# Patient Record
Sex: FEMALE | Race: BLACK OR AFRICAN AMERICAN | NOT HISPANIC OR LATINO | Employment: FULL TIME | URBAN - METROPOLITAN AREA
[De-identification: names, ages, dates, MRNs, and addresses within clinical notes are randomized per-mention and may not be internally consistent; named-entity substitution may affect disease eponyms.]

---

## 2017-02-20 ENCOUNTER — OFFICE VISIT (OUTPATIENT)
Dept: SURGERY | Facility: CLINIC | Age: 62
End: 2017-02-20
Payer: COMMERCIAL

## 2017-02-20 ENCOUNTER — OFFICE VISIT (OUTPATIENT)
Dept: INTERNAL MEDICINE | Facility: CLINIC | Age: 62
End: 2017-02-20
Payer: COMMERCIAL

## 2017-02-20 VITALS
HEART RATE: 75 BPM | WEIGHT: 162.25 LBS | HEIGHT: 60 IN | SYSTOLIC BLOOD PRESSURE: 149 MMHG | DIASTOLIC BLOOD PRESSURE: 58 MMHG | BODY MASS INDEX: 31.86 KG/M2

## 2017-02-20 VITALS
DIASTOLIC BLOOD PRESSURE: 84 MMHG | HEART RATE: 85 BPM | BODY MASS INDEX: 32.39 KG/M2 | HEIGHT: 60 IN | SYSTOLIC BLOOD PRESSURE: 140 MMHG | WEIGHT: 165 LBS

## 2017-02-20 DIAGNOSIS — K64.4 EXTERNAL HEMORRHOIDS: ICD-10-CM

## 2017-02-20 DIAGNOSIS — K64.8 HEMORRHOIDS, INTERNAL, WITH BLEEDING: Primary | ICD-10-CM

## 2017-02-20 DIAGNOSIS — K62.5 RECTAL BLEEDING: Primary | ICD-10-CM

## 2017-02-20 DIAGNOSIS — D13.7 INSULINOMA: ICD-10-CM

## 2017-02-20 DIAGNOSIS — K59.00 CONSTIPATION, UNSPECIFIED CONSTIPATION TYPE: ICD-10-CM

## 2017-02-20 PROCEDURE — 99203 OFFICE O/P NEW LOW 30 MIN: CPT | Mod: 25,S$GLB,, | Performed by: COLON & RECTAL SURGERY

## 2017-02-20 PROCEDURE — 1160F RVW MEDS BY RX/DR IN RCRD: CPT | Mod: S$GLB,,, | Performed by: INTERNAL MEDICINE

## 2017-02-20 PROCEDURE — 46600 DIAGNOSTIC ANOSCOPY SPX: CPT | Mod: S$GLB,,, | Performed by: COLON & RECTAL SURGERY

## 2017-02-20 PROCEDURE — 99213 OFFICE O/P EST LOW 20 MIN: CPT | Mod: S$GLB,,, | Performed by: INTERNAL MEDICINE

## 2017-02-20 PROCEDURE — 99999 PR PBB SHADOW E&M-EST. PATIENT-LVL IV: CPT | Mod: PBBFAC,,, | Performed by: INTERNAL MEDICINE

## 2017-02-20 PROCEDURE — 99999 PR PBB SHADOW E&M-EST. PATIENT-LVL III: CPT | Mod: PBBFAC,,, | Performed by: COLON & RECTAL SURGERY

## 2017-02-20 PROCEDURE — 1160F RVW MEDS BY RX/DR IN RCRD: CPT | Mod: S$GLB,,, | Performed by: COLON & RECTAL SURGERY

## 2017-02-20 RX ORDER — HYDROCORTISONE 25 MG/G
CREAM TOPICAL 2 TIMES DAILY
Qty: 30 G | Refills: 3 | Status: SHIPPED | OUTPATIENT
Start: 2017-02-20 | End: 2017-03-02

## 2017-02-20 NOTE — MR AVS SNAPSHOT
[facility] [address]  [address]  [address]  [facility] [address]  Phone: [phone]                  Rachael June   2017 8:30 AM   Office Visit    Description:  Female : 1955   Provider:  Marina Haas MD   Department:  [facility] [address]           Reason for Visit     Establish Care           Diagnoses this Visit        Comments    Rectal bleeding    -  Primary     Insulinoma                To Do List           Goals (5 Years of Data)     None      Follow-Up and Disposition     Return 2 weeks nurse BP check.      [facility]     [facility] -  Assistance  Registered nurses in the [facility] provide clinical advisement, health education, appointment booking, and other advisory services.  Call for this free service at [phone].             Medications           Message regarding Medications     Verify the changes and/or additions to your medication regime listed below are the same as discussed with your clinician today.  If any of these changes or additions are incorrect, please notify your healthcare provider.             Verify that the below list of medications is an accurate representation of the medications you are currently taking.  If none reported, the list may be blank. If incorrect, please contact your healthcare provider. Carry this list with you in case of emergency.           Current Medications     aspirin (ECOTRIN) 81 MG EC tablet Take 81 mg by mouth once daily.    b complex vitamins capsule Take 1 capsule by mouth once daily.    biotin 1 mg tablet Take 1,000 mcg by mouth 3 (three) times daily.    CALCIUM CARBONATE (CALCIUM 600 ORAL) Take by mouth.    esomeprazole (NEXIUM) 40 MG capsule Take 1 capsule (40 mg total) by mouth before breakfast.    fish oil-omega-3 fatty acids 300-1,000 mg capsule Take 2 g by mouth once daily.    fluticasone (FLONASE) 50 mcg/actuation nasal spray 2  sprays by Each Nare route once daily.    pravastatin (PRAVACHOL) 20 MG tablet Take 1 tablet (20 mg total) by mouth every evening.    valacyclovir (VALTREX) 1000 MG tablet Take 1 tablet (1,000 mg total) by mouth 2 (two) times daily.    vitamin D 1000 units Tab Take 185 mg by mouth once daily.           Clinical Reference Information           Your Vitals Were     BP Pulse Height Weight BMI    140/84 85 5' (1.524 m) 74.8 kg (165 lb) 32.22 kg/m2      Blood Pressure          Most Recent Value    BP  (!)  140/84      Allergies as of 2/20/2017     Adhesive Tape-silicones    Pcn [Penicillins]      Immunizations Administered on Date of Encounter - 2/20/2017     None      Orders Placed During Today's Visit      Normal Orders This Visit    Ambulatory consult to Colorectal Surgery       Language Assistance Services     ATTENTION: Language assistance services are available, free of charge. Please call [phone].      ATENCIÓN: Si habla patricia, tiene a zhang disposición servicios gratuitos de asistencia lingüística. Llame al [phone].     [facility] Ý: N?u b?n nói Ti?ng Vi?t, có các d?ch v? h? tr? ngôn ng? mi?n phí dành cho b?n. G?i s? [phone].         [facility] [address] complies with applicable Federal civil rights laws and does not discriminate on the basis of race, color, national origin, age, disability, or sex.

## 2017-02-20 NOTE — MR AVS SNAPSHOT
[facility]  [address]  [place] [address]  Phone: [phone]                  Rachael Juen   2017 3:30 PM   Office Visit    Description:  Female : 1955   Provider:  Edil Chen MD   Department:  [facility]           Reason for Visit     Follow-up           Diagnoses this Visit        Comments    Hemorrhoids, internal, with bleeding    -  Primary            To Do List           Goals (5 Years of Data)     None       These Medications        Disp Refills Start End    hydrocortisone (ANUSOL-HC) 2.5 % rectal cream 30 g 3 2017 3/2/2017    Place rectally 2 (two) times daily. - Rectal    Pharmacy: [id number] [facility] [place]  [address]  #: [phone]         [facility]     [facility] Nurse Care Line -  Assistance  Registered nurses in the [facility] Center provide clinical advisement, health education, appointment booking, and other advisory services.  Call for this free service at [phone].             Medications           Message regarding Medications     Verify the changes and/or additions to your medication regime listed below are the same as discussed with your clinician today.  If any of these changes or additions are incorrect, please notify your healthcare provider.        START taking these NEW medications        Refills    hydrocortisone (ANUSOL-HC) 2.5 % rectal cream 3    Sig: Place rectally 2 (two) times daily.    Class: Normal    Route: Rectal           Verify that the below list of medications is an accurate representation of the medications you are currently taking.  If none reported, the list may be blank. If incorrect, please contact your healthcare provider. Carry this list with you in case of emergency.           Current Medications     aspirin (ECOTRIN) 81 MG EC tablet Take 81 mg by mouth once daily.    b complex vitamins capsule Take 1 capsule by  mouth once daily.    biotin 1 mg tablet Take 1,000 mcg by mouth 3 (three) times daily.    CALCIUM CARBONATE (CALCIUM 600 ORAL) Take by mouth.    esomeprazole (NEXIUM) 40 MG capsule Take 1 capsule (40 mg total) by mouth before breakfast.    fish oil-omega-3 fatty acids 300-1,000 mg capsule Take 2 g by mouth once daily.    fluticasone (FLONASE) 50 mcg/actuation nasal spray 2 sprays by Each Nare route once daily.    hydrocortisone (ANUSOL-HC) 2.5 % rectal cream Place rectally 2 (two) times daily.    pravastatin (PRAVACHOL) 20 MG tablet Take 1 tablet (20 mg total) by mouth every evening.    valacyclovir (VALTREX) 1000 MG tablet Take 1 tablet (1,000 mg total) by mouth 2 (two) times daily.    vitamin D 1000 units Tab Take 185 mg by mouth once daily.           Clinical Reference Information           Your Vitals Were     BP Pulse Height Weight BMI    149/58 75 5' (1.524 m) 73.6 kg (162 lb 4.1 oz) 31.69 kg/m2      Blood Pressure          Most Recent Value    BP  (!)  149/58      Allergies as of 2/20/2017     Adhesive Tape-silicones    Pcn [Penicillins]      Immunizations Administered on Date of Encounter - 2/20/2017     None      Language Assistance Services     ATTENTION: Language assistance services are available, free of charge. Please call [phone].      ATENCIÓN: Si habla patricia, tiene a zhang disposición servicios gratuitos de asistencia lingüística. Llame al [phone].     [facility] Ý: N?u b?n nói Ti?ng Vi?t, có các d?ch v? h? tr? ngôn ng? mi?n phí dành cho b?n. G?i s? [phone].         [facility] complies with applicable Federal civil rights laws and does not discriminate on the basis of race, color, national origin, age, disability, or sex.

## 2017-02-20 NOTE — PROGRESS NOTES
"Subjective:       Patient ID: Rachael June is a 61 y.o. female.    Chief Complaint: Follow-up    HPI  60 yo F with c/o painless BRBPR after BM"s - blood on toilet paper & in toilet water, no mixed with stool.  +Constipation - better since she recently started using benefiber & miralax.  No abdominal pain, unexplained weight loss, anorexia, recent change in bowel habits, or other constitutional symptoms.    Last colonoscopy - July 2015 - 2 small adenomas removed from ascending colon - rec 5 yr follow-up  No family hx of CRC or IBD.      Review of patient's allergies indicates:   Allergen Reactions    Adhesive tape-silicones     Pcn [penicillins]        Past Medical History:   Diagnosis Date    Abnormal Pap smear of cervix     Condition not found     h/o abnl vaginal cuff biopsy-mild dysplasia    Dysplasia of vagina     vaginal cuff biopsy    Genital herpes, unspecified     GERD (gastroesophageal reflux disease)     Hyperlipidemia     Insulinoma     s/p resection    Special screening for malignant neoplasms, colon 7/6/2015       Past Surgical History:   Procedure Laterality Date    HYSTERECTOMY      fibroids    insulinoma resection      SHOULDER ARTHROSCOPY Left 9/23/2015    Dr Rm        Current Outpatient Prescriptions   Medication Sig Dispense Refill    aspirin (ECOTRIN) 81 MG EC tablet Take 81 mg by mouth once daily.      b complex vitamins capsule Take 1 capsule by mouth once daily.      biotin 1 mg tablet Take 1,000 mcg by mouth 3 (three) times daily.      CALCIUM CARBONATE (CALCIUM 600 ORAL) Take by mouth.      esomeprazole (NEXIUM) 40 MG capsule Take 1 capsule (40 mg total) by mouth before breakfast. 90 capsule 3    fish oil-omega-3 fatty acids 300-1,000 mg capsule Take 2 g by mouth once daily.      fluticasone (FLONASE) 50 mcg/actuation nasal spray 2 sprays by Each Nare route once daily. 3 Bottle 3    pravastatin (PRAVACHOL) 20 MG tablet Take 1 tablet (20 mg total) by " mouth every evening. 90 tablet 4    valacyclovir (VALTREX) 1000 MG tablet Take 1 tablet (1,000 mg total) by mouth 2 (two) times daily. 60 tablet 5    vitamin D 1000 units Tab Take 185 mg by mouth once daily.      hydrocortisone (ANUSOL-HC) 2.5 % rectal cream Place rectally 2 (two) times daily. 30 g 3     No current facility-administered medications for this visit.        Family History   Problem Relation Age of Onset    Heart disease Mother     Hypertension Mother     Diabetes Mother     Hyperlipidemia Mother     Stroke Father     Diabetes Father     Heart disease Father     Cancer Sister      breast cancer    Diabetes Sister     Cancer Maternal Aunt      ovarian cancer    Diabetes Brother     Diabetes Sister     Heart disease Brother        Social History     Social History    Marital status:      Spouse name: N/A    Number of children: 3    Years of education: N/A     Occupational History          [employer]     Social History Main Topics    Smoking status: Never Smoker    Smokeless tobacco: Never Used    Alcohol use No    Drug use: No    Sexual activity: Yes     Partners: Male     Birth control/ protection: Surgical     Other Topics Concern    None     Social History Narrative    She exercises every day.she has 8 grandchildren.       Review of Systems   Constitutional: Negative for chills and fever.   HENT: Negative for congestion and sore throat.    Eyes: Negative for visual disturbance.   Respiratory: Negative for cough and shortness of breath.    Cardiovascular: Negative for chest pain and palpitations.   Gastrointestinal: Positive for anal bleeding and constipation. Negative for abdominal distention, abdominal pain, blood in stool, diarrhea, nausea, rectal pain and vomiting.   Endocrine: Negative for cold intolerance and heat intolerance.   Genitourinary: Negative for dysuria and frequency.   Musculoskeletal: Negative for arthralgias, back pain and neck  pain.   Skin: Negative for rash.   Allergic/Immunologic: Negative for immunocompromised state.   Neurological: Negative for dizziness, light-headedness and headaches.   Hematological: Does not bruise/bleed easily.   Psychiatric/Behavioral: Negative for confusion. The patient is not nervous/anxious.        Objective:      Physical Exam   Constitutional: She is oriented to person, place, and time. She appears well-developed and well-nourished.   HENT:   Head: Normocephalic.   Pulmonary/Chest: Effort normal. No respiratory distress.   Abdominal: Soft. Bowel sounds are normal. She exhibits no distension and no mass. There is no tenderness. There is no rebound and no guarding.   Genitourinary:   Genitourinary Comments: Perineum - normal perianal skin, no mass, no fissure, small external hemorrhoid PML, larger external hemorrhoid AML - both are non-inflamed & non-thrombosed  ERNIE - good tone, no mass  Anoscopy - Grade 2 internal hemorrhoids with moderate inflammation     Musculoskeletal: Normal range of motion.   Neurological: She is alert and oriented to person, place, and time.   Skin: Skin is warm and dry.   Psychiatric: She has a normal mood and affect.           Assessment:       1. Hemorrhoids, internal, with bleeding    2. External hemorrhoids    3. Constipation, unspecified constipation type        Plan:   Discussed pathophysiology of hemorrhoidal disease.  Will start with conservative measures:   -Increased fiber intake (20-25 grams/day) and fluid intake (8-10 glasses water/day)   -Daily fiber supplement   -Soaks/sitz baths   -Avoid excessive trauma/straining if possible   -Avoid sitting on toilet for long periods   -Anusol bid.   RTO 6 weeks - if still bleeding, will discuss EBL.

## 2017-02-20 NOTE — LETTER
February 27, 2017      Marina Haas MD  [address]  [facility] [address]           [facility]  [address]  [facility] [address]  Phone: [phone]          Patient: Rachael June   MR Number: [id number]   YOB: 1955   Date of Visit: 2/20/2017       Dear Dr. Marina Haas:    Thank you for referring Rachael June to me for evaluation. Attached you will find relevant portions of my assessment and plan of care.    If you have questions, please do not hesitate to call me. I look forward to following Rachael June along with you.    Sincerely,    Edil Chen MD    Enclosure  CC:  No Recipients    If you would like to receive this communication electronically, please contact [email or web address] or [phone] to request more information on [employer] Link access.    For providers and/or their staff who would like to refer a patient to [facility], please contact us through our one-stop-shop provider referral line, [facility], at [phone].    If you feel you have received this communication in error or would no longer like to receive these types of communications, please e-mail [email or web address]

## 2017-03-01 NOTE — PROGRESS NOTES
Subjective:       Patient ID: Rachael June is a 61 y.o. female.    Chief Complaint: Establish Care    HPIPt formerly with Dr. Bone.  Has had off and on rectal bleeding on the tissue.  Colonoscopy 7/2015 no family hx.  Review of Systems   Respiratory: Negative for shortness of breath (PND or orthopnea).    Cardiovascular: Negative for chest pain (arm pain or jaw pain).   Gastrointestinal: Positive for anal bleeding. Negative for abdominal pain, blood in stool, constipation, diarrhea, nausea, rectal pain and vomiting.   Genitourinary: Negative for dysuria.   Neurological: Negative for seizures, syncope and headaches.       Objective:      Physical Exam   Constitutional: She is oriented to person, place, and time. She appears well-developed and well-nourished. No distress.   HENT:   Head: Normocephalic.   Mouth/Throat: Oropharynx is clear and moist.   Neck: Neck supple. No JVD present. No thyromegaly present.   Cardiovascular: Normal rate, regular rhythm, normal heart sounds and intact distal pulses.  Exam reveals no gallop and no friction rub.    No murmur heard.  Pulmonary/Chest: Effort normal and breath sounds normal. She has no wheezes. She has no rales.   Abdominal: Soft. Bowel sounds are normal. She exhibits no distension and no mass. There is no tenderness. There is no rebound and no guarding.   Genitourinary:   Genitourinary Comments: She declined a rectal exam.   Musculoskeletal: She exhibits no edema.   Lymphadenopathy:     She has no cervical adenopathy.   Neurological: She is alert and oriented to person, place, and time.   Skin: Skin is warm and dry.   Psychiatric: She has a normal mood and affect. Her behavior is normal. Judgment and thought content normal.       Assessment:       1. Rectal bleeding    2. Insulinoma        Plan:   Rectal bleeding  -     Ambulatory consult to Colorectal Surgery    Insulinoma  JOSE G

## 2017-03-06 ENCOUNTER — CLINICAL SUPPORT (OUTPATIENT)
Dept: INTERNAL MEDICINE | Facility: CLINIC | Age: 62
End: 2017-03-06
Payer: COMMERCIAL

## 2017-03-06 NOTE — PROGRESS NOTES
Verified patient name and date of birth, patient in clinic to check B/P:      Left arm:   1157 am  -  150/73,  75                Diamapp    Left wrist:  12:00 pm  -  163/101.  71             Patients  machine    Right wrist:  12:02 pm  -  164/104,  74          Patients machine

## 2017-03-07 ENCOUNTER — PATIENT MESSAGE (OUTPATIENT)
Dept: SURGERY | Facility: CLINIC | Age: 62
End: 2017-03-07

## 2017-03-08 ENCOUNTER — OFFICE VISIT (OUTPATIENT)
Dept: SURGERY | Facility: CLINIC | Age: 62
End: 2017-03-08
Payer: COMMERCIAL

## 2017-03-08 ENCOUNTER — TELEPHONE (OUTPATIENT)
Dept: INTERNAL MEDICINE | Facility: CLINIC | Age: 62
End: 2017-03-08

## 2017-03-08 ENCOUNTER — TELEPHONE (OUTPATIENT)
Dept: SURGERY | Facility: CLINIC | Age: 62
End: 2017-03-08

## 2017-03-08 VITALS
DIASTOLIC BLOOD PRESSURE: 74 MMHG | HEIGHT: 60 IN | BODY MASS INDEX: 31.94 KG/M2 | WEIGHT: 162.69 LBS | HEART RATE: 80 BPM | SYSTOLIC BLOOD PRESSURE: 145 MMHG

## 2017-03-08 DIAGNOSIS — K64.8 HEMORRHOIDS, INTERNAL, WITH BLEEDING: ICD-10-CM

## 2017-03-08 PROCEDURE — 1160F RVW MEDS BY RX/DR IN RCRD: CPT | Mod: S$GLB,,, | Performed by: COLON & RECTAL SURGERY

## 2017-03-08 PROCEDURE — 46221 LIGATION OF HEMORRHOID(S): CPT | Mod: S$GLB,,, | Performed by: COLON & RECTAL SURGERY

## 2017-03-08 PROCEDURE — 99213 OFFICE O/P EST LOW 20 MIN: CPT | Mod: 25,S$GLB,, | Performed by: COLON & RECTAL SURGERY

## 2017-03-08 PROCEDURE — 99999 PR PBB SHADOW E&M-EST. PATIENT-LVL III: CPT | Mod: PBBFAC,,, | Performed by: COLON & RECTAL SURGERY

## 2017-03-08 RX ORDER — HYDROCODONE BITARTRATE AND ACETAMINOPHEN 5; 325 MG/1; MG/1
1 TABLET ORAL EVERY 6 HOURS PRN
Qty: 12 TABLET | Refills: 0 | Status: SHIPPED | OUTPATIENT
Start: 2017-03-08 | End: 2017-05-15

## 2017-03-08 NOTE — TELEPHONE ENCOUNTER
----- Message from Chi Bradford sent at 3/8/2017  8:20 AM CST -----  Contact: Self [phone]  The above pt is requesting that you please refer her to go see another [place]/Rectal Provider because she's still having the same problems as before, advice      Thanks

## 2017-03-08 NOTE — TELEPHONE ENCOUNTER
----- Message from Maryam Gan MA sent at 3/8/2017  8:16 AM CST -----  Contact: Mobile: [phone]   Pt of Dr Chen wants to be seen today for pain w/ hemorrhoids. Dr Chen is not in so she wants to be seen by another MD , not an SAVANNA.  Mobile: [phone]

## 2017-04-12 ENCOUNTER — OFFICE VISIT (OUTPATIENT)
Dept: SURGERY | Facility: CLINIC | Age: 62
End: 2017-04-12
Payer: COMMERCIAL

## 2017-04-12 VITALS — WEIGHT: 164.88 LBS | BODY MASS INDEX: 32.37 KG/M2 | HEIGHT: 60 IN

## 2017-04-12 DIAGNOSIS — K64.8 HEMORRHOIDS, INTERNAL, WITH BLEEDING: Primary | ICD-10-CM

## 2017-04-12 PROCEDURE — 99999 PR PBB SHADOW E&M-EST. PATIENT-LVL III: CPT | Mod: PBBFAC,,, | Performed by: COLON & RECTAL SURGERY

## 2017-04-12 PROCEDURE — 99024 POSTOP FOLLOW-UP VISIT: CPT | Mod: S$GLB,,, | Performed by: COLON & RECTAL SURGERY

## 2017-04-12 NOTE — PROGRESS NOTES
"Patient ID:  Rachael June is a 61 y.o. female     Chief Complaint:   Chief Complaint   Patient presents with    Follow-up    Hemorrhoids        HPI: slightly improved but stools not soft    c/o painless BRBPR after BM"s - blood on toilet paper & in toilet water,  not mixed with stool. +Constipation - better since she recently started using benefiber & miralax. No abdominal pain, unexplained weight loss, anorexia, recent change in bowel habits, or other constitutional symptoms. SAw Dr Chen last month and was started on fibe, but continues to bled. Also now with some external disease.     Last colonoscopy - July 2015 - 2 small adenomas removed from ascending colon - rec 5 yr follow-up  No family hx of CRC or IBD.    ROS:        Constitutional: No fever, chills, activity or appetite change.      HENT: No hearing loss, facial swelling, neck pain or stiffness.       Eyes: No discharge, itching and visual disturbance.      Respiratory: No apnea, cough, choking or shortness of breath.       Cardiovascular: No leg swelling or chest pain      Gastrointestinal: No abdominal distention or change in bowel habbits     Genitourinary: No dysuria, frequency or flank pain.      Musculoskeletal: No arthralgias or gait problem.      Neurological: No dizziness, seizures or weakness.      Hematological: No adenopathy.      Psychiatric/Behavioral: No hallucinations or behavioral problems.       PE:    APPEARANCE: Well nourished, well developed, in no acute distress.   CHEST: Lungs clear. Normal respiratory effort.  CARDIOVASCULAR: Normal rhythm. No edema.  ABDOMEN: Soft. No tenderness or masses.  Rectum:  External ronit rigt anterior and left posterior, NST, no masses or tenderness  Anoscopy: Grade 2 int hemorrhoids    Musculoskeletal: Symmetric, normal range of motion and strength.   Neurological: Alert and oriented to person, place, and time. Normal reflexes.   Skin: Skin is warm and dry.   Psychiatric: Normal mood and " affect. Behavior is normal and appropriate.     IMP:  Internal Hemorrhoids, Symptomatic      PLAN: add fiber and moiralsx daily. RTc PRN.

## 2017-05-01 ENCOUNTER — OFFICE VISIT (OUTPATIENT)
Dept: INTERNAL MEDICINE | Facility: CLINIC | Age: 62
End: 2017-05-01
Payer: COMMERCIAL

## 2017-05-01 VITALS
DIASTOLIC BLOOD PRESSURE: 80 MMHG | TEMPERATURE: 98 F | WEIGHT: 158 LBS | HEIGHT: 60 IN | OXYGEN SATURATION: 99 % | SYSTOLIC BLOOD PRESSURE: 140 MMHG | HEART RATE: 78 BPM | BODY MASS INDEX: 31.02 KG/M2

## 2017-05-01 DIAGNOSIS — H92.02 OTALGIA OF LEFT EAR: ICD-10-CM

## 2017-05-01 DIAGNOSIS — J01.40 ACUTE NON-RECURRENT PANSINUSITIS: Primary | ICD-10-CM

## 2017-05-01 PROCEDURE — 1160F RVW MEDS BY RX/DR IN RCRD: CPT | Mod: S$GLB,,, | Performed by: NURSE PRACTITIONER

## 2017-05-01 PROCEDURE — 99213 OFFICE O/P EST LOW 20 MIN: CPT | Mod: S$GLB,,, | Performed by: NURSE PRACTITIONER

## 2017-05-01 PROCEDURE — 99999 PR PBB SHADOW E&M-EST. PATIENT-LVL IV: CPT | Mod: PBBFAC,,, | Performed by: NURSE PRACTITIONER

## 2017-05-01 RX ORDER — AZITHROMYCIN 250 MG/1
TABLET, FILM COATED ORAL
Qty: 6 TABLET | Refills: 0 | Status: SHIPPED | OUTPATIENT
Start: 2017-05-01 | End: 2017-05-15

## 2017-05-01 RX ORDER — BENZONATATE 200 MG/1
200 CAPSULE ORAL NIGHTLY
Qty: 30 CAPSULE | Refills: 0 | Status: SHIPPED | OUTPATIENT
Start: 2017-05-01 | End: 2017-05-01

## 2017-05-01 RX ORDER — BENZONATATE 200 MG/1
200 CAPSULE ORAL NIGHTLY
Qty: 30 CAPSULE | Refills: 0 | Status: SHIPPED | OUTPATIENT
Start: 2017-05-01 | End: 2017-05-15

## 2017-05-01 NOTE — MR AVS SNAPSHOT
[facility]  [address]  [place] [address]  Phone: [phone]  Fax: [phone]                  Rachael June   2017 5:30 PM   Office Visit    Description:  Female : 1955   Provider:  Jeremy Bates DNP   Department:  [facility]           Reason for Visit     Otalgia           Diagnoses this Visit        Comments    Acute non-recurrent pansinusitis    -  Primary     Otalgia of left ear                To Do List           Future Appointments        Provider Department Dept Phone    2017 8:00 AM Marina Haas MD [facility] [address] [phone]      Goals (5 Years of Data)     None       These Medications        Disp Refills Start End    azithromycin (Z-MARIA ANTONIA) 250 MG tablet 6 tablet 0 2017     2 tabs on day 1 then 1 tab on days 2-5    [facility] [place] [address] [address] [facility] Ph #: [phone]       benzonatate (TESSALON) 200 MG capsule 30 capsule 0 2017     Take 1 capsule (200 mg total) by mouth every evening. - Oral    Pharmacy: [facility] [place] [address] [address] [facility] Ph #: [phone]         [facility]     [facility] Care [facility] - 24/ Assistance  Unless otherwise directed by your provider, please contact [facility], our nurse care line that is available for / assistance.     Registered nurses in the [facility] provide: appointment scheduling, clinical advisement, health education, and other advisory services.  Call: [phone] (toll free)               Medications           Message regarding Medications     Verify the changes and/or additions to your medication regime listed below are the same as discussed with your clinician today.  If any of these changes or additions are incorrect, please notify your healthcare provider.        START taking these NEW medications        Refills    azithromycin  (Z-MARIA ANTONIA) 250 MG tablet 0    Si tabs on day 1 then 1 tab on days 2-5    Class: Normal    benzonatate (TESSALON) 200 MG capsule 0    Sig: Take 1 capsule (200 mg total) by mouth every evening.    Class: Normal    Route: Oral           Verify that the below list of medications is an accurate representation of the medications you are currently taking.  If none reported, the list may be blank. If incorrect, please contact your healthcare provider. Carry this list with you in case of emergency.           Current Medications     aspirin (ECOTRIN) 81 MG EC tablet Take 81 mg by mouth once daily.    b complex vitamins capsule Take 1 capsule by mouth once daily.    biotin 1 mg tablet Take 1,000 mcg by mouth 3 (three) times daily.    CALCIUM CARBONATE (CALCIUM 600 ORAL) Take by mouth.    esomeprazole (NEXIUM) 40 MG capsule Take 1 capsule (40 mg total) by mouth before breakfast.    fish oil-omega-3 fatty acids 300-1,000 mg capsule Take 2 g by mouth once daily.    fluticasone (FLONASE) 50 mcg/actuation nasal spray 2 sprays by Each Nare route once daily.    pravastatin (PRAVACHOL) 20 MG tablet Take 1 tablet (20 mg total) by mouth every evening.    valacyclovir (VALTREX) 1000 MG tablet Take 1 tablet (1,000 mg total) by mouth 2 (two) times daily.    vitamin D 1000 units Tab Take 185 mg by mouth once daily.    azithromycin (Z-MARIA ANTONIA) 250 MG tablet 2 tabs on day 1 then 1 tab on days 2-5    benzonatate (TESSALON) 200 MG capsule Take 1 capsule (200 mg total) by mouth every evening.    hydrocodone-acetaminophen 5-325mg (NORCO) 5-325 mg per tablet Take 1 tablet by mouth every 6 (six) hours as needed for Pain.           Clinical Reference Information           Your Vitals Were     BP Pulse Temp Height Weight SpO2    140/80 78 98.3 °F (36.8 °C) 5' (1.524 m) 71.7 kg (158 lb) 99%    BMI                30.86 kg/m2          Blood Pressure          Most Recent Value    BP  (!)  140/80      Allergies as of 2017     Adhesive Tape-silicones     Pcn [Penicillins]      Immunizations Administered on Date of Encounter - 5/1/2017     None      Instructions    Mucinex D as directed on package      Flonase 1 spray to both nares twice a day or 2 sprays once a day    Sinus wash with neti pot, using sterile water only.    Ibuprofen 400mg every 6h for 3-4 days with food.    Sinusitis (Antibiotic Treatment)    The sinuses are air-filled spaces within the bones of the face. They connect to the inside of the nose. Sinusitis is an inflammation of the tissue lining the sinus cavity. Sinus inflammation can occur during a cold. It can also be due to allergies to pollens and other particles in the air. Sinusitis can cause symptoms of sinus congestion and fullness. A sinus infection causes fever, headache and facial pain. There is often green or yellow drainage from the nose or into the back of the throat (post-nasal drip). You have been given antibiotics to treat this condition.  Home care:  · Take the full course of antibiotics as instructed. Do not stop taking them, even if you feel better.  · Drink plenty of water, hot tea, and other liquids. This may help thin mucus. It also may promote sinus drainage.  · Heat may help soothe painful areas of the face. Use a towel soaked in hot water. Or,  the shower and direct the hot spray onto your face. Using a vaporizer along with a menthol rub at night may also help.   · An expectorant containing guaifenesin may help thin the mucus and promote drainage from the sinuses.  · Over-the-counter decongestants may be used unless a similar medicine was prescribed. Nasal sprays work the fastest. Use one that contains phenylephrine or oxymetazoline. First blow the nose gently. Then use the spray. Do not use these medicines more often than directed on the label or symptoms may get worse. You may also use tablets containing pseudoephedrine. Avoid products that combine ingredients, because side effects may be increased. Read labels. You  can also ask the pharmacist for help. (NOTE: Persons with high blood pressure should not use decongestants. They can raise blood pressure.)  · Over-the-counter antihistamines may help if allergies contributed to your sinusitis.    · Do not use nasal rinses or irrigation during an acute sinus infection, unless told to by your health care provider. Rinsing may spread the infection to other sinuses.  · Use acetaminophen or ibuprofen to control pain, unless another pain medicine was prescribed. (If you have chronic liver or kidney disease or ever had a stomach ulcer, talk with your doctor before using these medicines. Aspirin should never be used in anyone under 18 years of age who is ill with a fever. It may cause severe liver damage.)  · Don't smoke. This can worsen symptoms.  Follow-up care  Follow up with your healthcare provider or our staff if you are not improving within the next week.  When to seek medical advice  Call your healthcare provider if any of these occur:  · Facial pain or headache becoming more severe  · Stiff neck  · Unusual drowsiness or confusion  · Swelling of the forehead or eyelids  · Vision problems, including blurred or double vision  · Fever of 100.4ºF (38ºC) or higher, or as directed by your healthcare provider  · Seizure  · Breathing problems  · Symptoms not resolving within 10 days  Date Last Reviewed: 4/13/2015  © 4569-4618 [employer]. [address] [place] [address], [place] [address]. All rights reserved. This information is not intended as a substitute for professional medical care. Always follow your healthcare professional's instructions.    Benzocaine/menthol lozenges for viral pharyngitis    Lots of warm fluids and warm salt water gargles    May use regular otc dose of delsym plain           Language Assistance Services     ATTENTION: Language assistance services are available, free of charge. Please call [phone].      ATENCIÓN: ivone Gustafson  disposición servicios gratuitos de asistencia lingüística. Kaitlinvanessa al [phone].     OCTAVIA Ý: N?u b?n nói Ti?ng Vi?t, có các d?ch v? h? tr? ngôn ng? mi?n phí dành cho b?n. G?i s? [phone].         [facility] complies with applicable Federal civil rights laws and does not discriminate on the basis of race, color, national origin, age, disability, or sex.

## 2017-05-01 NOTE — PROGRESS NOTES
Subjective:       Patient ID: Rachael June is a 61 y.o. female.    Chief Complaint: Otalgia    Otalgia    There is pain in both ears. This is a new problem. The current episode started in the past 7 days. The problem occurs constantly. The problem has been gradually worsening. There has been no fever. The pain is at a severity of 7/10. The pain is severe. Associated symptoms include coughing (occasionally productive in the mornings), rhinorrhea and a sore throat. Pertinent negatives include no abdominal pain, diarrhea, ear discharge, headaches, rash or vomiting. She has tried ear drops for the symptoms. The treatment provided no relief.     Review of Systems   Constitutional: Negative for activity change, appetite change, chills, diaphoresis and fever.   HENT: Positive for congestion, ear pain, postnasal drip, rhinorrhea, sinus pressure, sneezing, sore throat and voice change. Negative for ear discharge, nosebleeds and trouble swallowing.    Eyes: Positive for pain. Negative for photophobia, discharge, redness, itching and visual disturbance.   Respiratory: Positive for cough (occasionally productive in the mornings). Negative for chest tightness and shortness of breath.    Cardiovascular: Negative for chest pain and leg swelling.   Gastrointestinal: Negative for abdominal distention, abdominal pain, blood in stool, constipation, diarrhea, nausea and vomiting.   Genitourinary: Negative for dysuria, frequency, hematuria, urgency and vaginal discharge.   Musculoskeletal: Negative for arthralgias, back pain and myalgias.   Skin: Negative for rash and wound.   Neurological: Negative for dizziness, syncope and headaches.   Hematological: Negative for adenopathy.   Psychiatric/Behavioral: Negative for suicidal ideas.   All other systems reviewed and are negative.      Objective:      Physical Exam   Constitutional: She is oriented to person, place, and time. Vital signs are normal. She appears well-developed and  well-nourished. She is cooperative.  Non-toxic appearance. She does not have a sickly appearance. She does not appear ill. No distress.   HENT:   Head: Normocephalic and atraumatic.   Right Ear: Hearing, tympanic membrane, external ear and ear canal normal.   Left Ear: Hearing, tympanic membrane, external ear and ear canal normal.   Nose: Right sinus exhibits maxillary sinus tenderness and frontal sinus tenderness. Left sinus exhibits maxillary sinus tenderness and frontal sinus tenderness.   Mouth/Throat: Uvula is midline, oropharynx is clear and moist and mucous membranes are normal.   Eyes: Conjunctivae and EOM are normal. Pupils are equal, round, and reactive to light. Right eye exhibits no discharge. Left eye exhibits no discharge.   Neck: Normal range of motion. Neck supple.   Cardiovascular: Normal rate, regular rhythm, S1 normal, S2 normal, normal heart sounds and intact distal pulses.  Exam reveals no gallop, no S3, no S4 and no friction rub.    No murmur heard.  Pulmonary/Chest: Effort normal and breath sounds normal. No respiratory distress. She has no decreased breath sounds. She has no wheezes. She has no rhonchi. She has no rales. She exhibits no tenderness.   Abdominal: Soft. Bowel sounds are normal. She exhibits no distension and no mass. There is no guarding.   Musculoskeletal: Normal range of motion. She exhibits no edema or tenderness.   Neurological: She is alert and oriented to person, place, and time. She has normal reflexes. She displays normal reflexes. No cranial nerve deficit. She exhibits normal muscle tone. Coordination normal.   Skin: Skin is warm and dry.   Psychiatric: She has a normal mood and affect. Her behavior is normal. Judgment and thought content normal.       Assessment:       1. Acute non-recurrent pansinusitis    2. Otalgia of left ear        Plan:       Rachael was seen today for otalgia.    Diagnoses and all orders for this visit:    Acute non-recurrent pansinusitis  -     " azithromycin (Z-MARIA ANTONIA) 250 MG tablet; 2 tabs on day 1 then 1 tab on days 2-5  -     Discontinue: benzonatate (TESSALON) 200 MG capsule; Take 1 capsule (200 mg total) by mouth every evening.  -     benzonatate (TESSALON) 200 MG capsule; Take 1 capsule (200 mg total) by mouth every evening.    Otalgia of left ear  -     azithromycin (Z-MARIA ANTONIA) 250 MG tablet; 2 tabs on day 1 then 1 tab on days 2-5    Recommended mucinex-D with plenty of water  Recommended use of neti-pot with sterile water only  Recommended ibuprofen otc  Recommended flonase nasal spray, pt verbalized instructions to look down, spray, then gently sniff  Recommended benzocaine/menthol wander every 2h    Pt has been given instructions populated from [employer] database and has verbalized understanding of the after visit summary and information contained wherein.    Follow up with a primary care provider. May go to ER for acute shortness of breath, lightheadedness, fever, or any other emergent complaints or changes in condition.    "This note will be shared with the patient"    The [employer] medical Dictation software was used during the dictation of portions or the entirety of this medical record.  Phonetic or grammatic errors may exist due to the use of this software. For clarification, refer to the author of the document.             "

## 2017-05-01 NOTE — PATIENT INSTRUCTIONS
Mucinex D as directed on package      Flonase 1 spray to both nares twice a day or 2 sprays once a day    Sinus wash with neti pot, using sterile water only.    Ibuprofen 400mg every 6h for 3-4 days with food.    Sinusitis (Antibiotic Treatment)    The sinuses are air-filled spaces within the bones of the face. They connect to the inside of the nose. Sinusitis is an inflammation of the tissue lining the sinus cavity. Sinus inflammation can occur during a cold. It can also be due to allergies to pollens and other particles in the air. Sinusitis can cause symptoms of sinus congestion and fullness. A sinus infection causes fever, headache and facial pain. There is often green or yellow drainage from the nose or into the back of the throat (post-nasal drip). You have been given antibiotics to treat this condition.  Home care:  · Take the full course of antibiotics as instructed. Do not stop taking them, even if you feel better.  · Drink plenty of water, hot tea, and other liquids. This may help thin mucus. It also may promote sinus drainage.  · Heat may help soothe painful areas of the face. Use a towel soaked in hot water. Or,  the shower and direct the hot spray onto your face. Using a vaporizer along with a menthol rub at night may also help.   · An expectorant containing guaifenesin may help thin the mucus and promote drainage from the sinuses.  · Over-the-counter decongestants may be used unless a similar medicine was prescribed. Nasal sprays work the fastest. Use one that contains phenylephrine or oxymetazoline. First blow the nose gently. Then use the spray. Do not use these medicines more often than directed on the label or symptoms may get worse. You may also use tablets containing pseudoephedrine. Avoid products that combine ingredients, because side effects may be increased. Read labels. You can also ask the pharmacist for help. (NOTE: Persons with high blood pressure should not use decongestants.  They can raise blood pressure.)  · Over-the-counter antihistamines may help if allergies contributed to your sinusitis.    · Do not use nasal rinses or irrigation during an acute sinus infection, unless told to by your health care provider. Rinsing may spread the infection to other sinuses.  · Use acetaminophen or ibuprofen to control pain, unless another pain medicine was prescribed. (If you have chronic liver or kidney disease or ever had a stomach ulcer, talk with your doctor before using these medicines. Aspirin should never be used in anyone under 18 years of age who is ill with a fever. It may cause severe liver damage.)  · Don't smoke. This can worsen symptoms.  Follow-up care  Follow up with your healthcare provider or our staff if you are not improving within the next week.  When to seek medical advice  Call your healthcare provider if any of these occur:  · Facial pain or headache becoming more severe  · Stiff neck  · Unusual drowsiness or confusion  · Swelling of the forehead or eyelids  · Vision problems, including blurred or double vision  · Fever of 100.4ºF (38ºC) or higher, or as directed by your healthcare provider  · Seizure  · Breathing problems  · Symptoms not resolving within 10 days  Date Last Reviewed: 4/13/2015  © 3973-2885 [employer]. [address] [place] [address], [place] [address]. All rights reserved. This information is not intended as a substitute for professional medical care. Always follow your healthcare professional's instructions.    Benzocaine/menthol lozenges for viral pharyngitis    Lots of warm fluids and warm salt water gargles    May use regular otc dose of delsym plain

## 2017-05-15 ENCOUNTER — HOSPITAL ENCOUNTER (EMERGENCY)
Facility: HOSPITAL | Age: 62
Discharge: HOME | End: 2017-05-15
Attending: EMERGENCY MEDICINE
Payer: COMMERCIAL

## 2017-05-15 VITALS
TEMPERATURE: 98 F | WEIGHT: 150 LBS | OXYGEN SATURATION: 100 % | HEIGHT: 60 IN | HEART RATE: 77 BPM | BODY MASS INDEX: 29.45 KG/M2 | SYSTOLIC BLOOD PRESSURE: 141 MMHG | DIASTOLIC BLOOD PRESSURE: 64 MMHG | RESPIRATION RATE: 16 BRPM

## 2017-05-15 DIAGNOSIS — K21.9 GASTROESOPHAGEAL REFLUX DISEASE, ESOPHAGITIS PRESENCE NOT SPECIFIED: Primary | ICD-10-CM

## 2017-05-15 DIAGNOSIS — R07.9 CHEST PAIN: ICD-10-CM

## 2017-05-15 LAB
ALBUMIN SERPL BCP-MCNC: 4.3 G/DL
ALP SERPL-CCNC: 107 U/L
ALT SERPL W/O P-5'-P-CCNC: 20 U/L
ANION GAP SERPL CALC-SCNC: 11 MMOL/L
AST SERPL-CCNC: 20 U/L
BASOPHILS # BLD AUTO: 0.02 K/UL
BASOPHILS NFR BLD: 0.3 %
BILIRUB SERPL-MCNC: 0.8 MG/DL
BNP SERPL-MCNC: 34 PG/ML
BUN SERPL-MCNC: 9 MG/DL
CALCIUM SERPL-MCNC: 10 MG/DL
CHLORIDE SERPL-SCNC: 105 MMOL/L
CO2 SERPL-SCNC: 25 MMOL/L
CREAT SERPL-MCNC: 0.8 MG/DL
D DIMER PPP IA.FEU-MCNC: 0.22 MG/L FEU
DIFFERENTIAL METHOD: ABNORMAL
EOSINOPHIL # BLD AUTO: 0 K/UL
EOSINOPHIL NFR BLD: 0.7 %
ERYTHROCYTE [DISTWIDTH] IN BLOOD BY AUTOMATED COUNT: 12.6 %
EST. GFR  (AFRICAN AMERICAN): >60 ML/MIN/1.73 M^2
EST. GFR  (NON AFRICAN AMERICAN): >60 ML/MIN/1.73 M^2
GLUCOSE SERPL-MCNC: 96 MG/DL
HCT VFR BLD AUTO: 42.1 %
HGB BLD-MCNC: 14.3 G/DL
LYMPHOCYTES # BLD AUTO: 3.3 K/UL
LYMPHOCYTES NFR BLD: 55 %
MCH RBC QN AUTO: 30.2 PG
MCHC RBC AUTO-ENTMCNC: 34 %
MCV RBC AUTO: 89 FL
MONOCYTES # BLD AUTO: 0.3 K/UL
MONOCYTES NFR BLD: 5.6 %
NEUTROPHILS # BLD AUTO: 2.3 K/UL
NEUTROPHILS NFR BLD: 38.2 %
PLATELET # BLD AUTO: 241 K/UL
PMV BLD AUTO: 10.6 FL
POTASSIUM SERPL-SCNC: 3.9 MMOL/L
PROT SERPL-MCNC: 8.2 G/DL
RBC # BLD AUTO: 4.73 M/UL
SODIUM SERPL-SCNC: 141 MMOL/L
TROPONIN I SERPL DL<=0.01 NG/ML-MCNC: <0.006 NG/ML
TROPONIN I SERPL DL<=0.01 NG/ML-MCNC: <0.006 NG/ML
WBC # BLD AUTO: 6.05 K/UL

## 2017-05-15 PROCEDURE — 93005 ELECTROCARDIOGRAM TRACING: CPT

## 2017-05-15 PROCEDURE — 85379 FIBRIN DEGRADATION QUANT: CPT

## 2017-05-15 PROCEDURE — 84484 ASSAY OF TROPONIN QUANT: CPT

## 2017-05-15 PROCEDURE — 85025 COMPLETE CBC W/AUTO DIFF WBC: CPT

## 2017-05-15 PROCEDURE — 93010 ELECTROCARDIOGRAM REPORT: CPT | Mod: ,,, | Performed by: INTERNAL MEDICINE

## 2017-05-15 PROCEDURE — 99284 EMERGENCY DEPT VISIT MOD MDM: CPT

## 2017-05-15 PROCEDURE — 25000003 PHARM REV CODE 250: Performed by: EMERGENCY MEDICINE

## 2017-05-15 PROCEDURE — 83880 ASSAY OF NATRIURETIC PEPTIDE: CPT

## 2017-05-15 PROCEDURE — 80053 COMPREHEN METABOLIC PANEL: CPT

## 2017-05-15 PROCEDURE — 25000003 PHARM REV CODE 250: Performed by: PHYSICIAN ASSISTANT

## 2017-05-15 RX ORDER — ASPIRIN 325 MG
325 TABLET ORAL
Status: COMPLETED | OUTPATIENT
Start: 2017-05-15 | End: 2017-05-15

## 2017-05-15 RX ADMIN — LIDOCAINE HYDROCHLORIDE: 20 SOLUTION ORAL; TOPICAL at 12:05

## 2017-05-15 RX ADMIN — ASPIRIN 325 MG ORAL TABLET 325 MG: 325 PILL ORAL at 10:05

## 2017-05-15 NOTE — ED NOTES
Pt states she does not want to get a stress test today. States she would like to wait until June, when she is already scheduled to have one. States that if all of her test results so far are normal then she thinks the pain she is experiencing is just due to her GERD. Dr. Antunez informed

## 2017-05-15 NOTE — ED NOTES
Pt c/o sharp and burning pain to left chest since yesterday. Pain occasionally radiates to left shoulder. Pain is reproducible. Denies any other complaints. Normal sinus rhythm on monitor. Respirations even, unlabored. Skin is warm, dry. Pt denies personal cardiac history, but reports extensive family cardiac history.

## 2017-05-15 NOTE — ED PROVIDER NOTES
"Encounter Date: 5/15/2017       History     Chief Complaint   Patient presents with    Chest Pain     began yesterday; sharp and "burning" pain; left sided; states radiates to back and left shoulder; intermittent     Review of patient's allergies indicates:   Allergen Reactions    Adhesive tape-silicones     Pcn [penicillins]      HPI Comments: 62 y/o AAF with PMHx of GERD and HLD presents with left sided chest pain since last night at 6 pm.  Pt reports the pain started at 1800 and was sharp and stabbing in the left anterior chest.  She has GERD and reports that she has had similar pain in the past due to GERD.  She took a nexium and drank a warm glass of water after which the pain improved but did not resolve.  She "was able to get a little sleep" but when she woke up this morning, the pain was present and radiating to her left shoulder and back.  She reports similar radiated pain due to GERD in the past.  The pain is not exacerbated with position change, movement or deep breathing.  No relieving factors.  Pain is still present and unchanged.  No associated n/v/sob/diaphoresis/hemotpysis/abd pain.  No calf swelling or hx of dvt/pe.  Pt reports her b/p is typically in the 130's systolic and that she follows up with her pcp every year.  No DM, HTN, or tobacco abuse.  She has a family hx of CAD.  Her mom and 2 brothers have had CABG.  Last stress test in 2012 and "normal."    The history is provided by the patient. No  was used.     Past Medical History:   Diagnosis Date    Abnormal Pap smear of cervix     Condition not found     h/o abnl vaginal cuff biopsy-mild dysplasia    Dysplasia of vagina     vaginal cuff biopsy    Genital herpes, unspecified     GERD (gastroesophageal reflux disease)     Hyperlipidemia     Insulinoma     s/p resection    Special screening for malignant neoplasms, colon 7/6/2015     Past Surgical History:   Procedure Laterality Date    HYSTERECTOMY      fibroids "    insulinoma resection      SHOULDER ARTHROSCOPY Left 9/23/2015    Dr Rm      Family History   Problem Relation Age of Onset    Heart disease Mother     Hypertension Mother     Diabetes Mother     Hyperlipidemia Mother     Stroke Father     Diabetes Father     Heart disease Father     Cancer Sister      breast cancer    Diabetes Sister     Cancer Maternal Aunt      ovarian cancer    Diabetes Brother     Diabetes Sister     Heart disease Brother      Social History   Substance Use Topics    Smoking status: Never Smoker    Smokeless tobacco: Never Used    Alcohol use No     Review of Systems   Constitutional: Negative for activity change, appetite change, chills, diaphoresis and fever.   HENT: Negative for congestion and sore throat.    Eyes: Negative for photophobia and visual disturbance.   Respiratory: Negative for cough, chest tightness and shortness of breath.    Cardiovascular: Positive for chest pain. Negative for palpitations and leg swelling.   Gastrointestinal: Negative for abdominal distention, abdominal pain, diarrhea, nausea and vomiting.   Endocrine: Negative for polydipsia and polyphagia.   Genitourinary: Negative for difficulty urinating, dysuria and flank pain.   Musculoskeletal: Negative for back pain and neck pain.   Skin: Negative for pallor and rash.   Allergic/Immunologic: Negative for immunocompromised state.   Neurological: Negative for dizziness, syncope, weakness and headaches.   Hematological: Does not bruise/bleed easily.   Psychiatric/Behavioral: Negative for agitation and confusion.   All other systems reviewed and are negative.      Physical Exam   Initial Vitals   BP Pulse Resp Temp SpO2   05/15/17 0842 05/15/17 0842 05/15/17 0842 05/15/17 0842 05/15/17 0842   146/71 74 18 98.1 °F (36.7 °C) 99 %     Physical Exam    Nursing note and vitals reviewed.  Constitutional: She appears well-developed and well-nourished. She is not diaphoretic. No distress.   HENT:    Head: Normocephalic and atraumatic.   Mouth/Throat: Oropharynx is clear and moist.   Eyes: Conjunctivae and EOM are normal. No scleral icterus.   Neck: Normal range of motion. Neck supple.   Cardiovascular: Normal rate, regular rhythm and normal heart sounds. Exam reveals no gallop and no friction rub.    No murmur heard.  Pulmonary/Chest: Breath sounds normal. No respiratory distress. She has no wheezes. She has no rhonchi. She has no rales. She exhibits tenderness. She exhibits no crepitus, no edema and no swelling.       Abdominal: Soft. Bowel sounds are normal. She exhibits no distension. There is no tenderness. There is no rebound and no guarding.   Musculoskeletal: Normal range of motion. She exhibits no edema or tenderness.   Lymphadenopathy:     She has no cervical adenopathy.   Neurological: She is alert and oriented to person, place, and time.   Skin: Skin is warm and dry. No rash noted. No erythema.   Psychiatric: She has a normal mood and affect. Her behavior is normal. Judgment and thought content normal.         ED Course   Procedures  Labs Reviewed   CBC W/ AUTO DIFFERENTIAL - Abnormal; Notable for the following:        Result Value    Lymph% 55.0 (*)     All other components within normal limits   COMPREHENSIVE METABOLIC PANEL   TROPONIN I   B-TYPE NATRIURETIC PEPTIDE   TROPONIN I   D DIMER, QUANTITATIVE     EKG Readings: (Independently Interpreted)   Initial Reading: No STEMI. Rhythm: Normal Sinus Rhythm. Heart Rate: 71. Ectopy: No Ectopy. Conduction: Normal. Clinical Impression: Normal Sinus Rhythm       X-Rays:   Independently Interpreted Readings:   Chest X-Ray: Normal heart size.  No infiltrates.  No acute abnormalities.     Medical Decision Making:   Initial Assessment:   62 y/o F with left anterior chest pain since 1800 yesterday.    Differential Diagnosis:   DDX: STEMI, arrhythmia, pericarditis, pulmonary embolus, costochondritis, GERD, AAA  Independently Interpreted Test(s):   I have  ordered and independently interpreted X-rays - see prior notes.  I have ordered and independently interpreted EKG Reading(s) - see prior notes  Clinical Tests:   Lab Tests: Ordered and Reviewed  The following lab test(s) were unremarkable: CBC, CMP, Troponin and D-Dimer  Radiological Study: Ordered and Reviewed  Medical Tests: Ordered and Reviewed  ED Management:   PT PRESENTS WITH CP SINCE 1800 YESTERDAY.  PT HAS RF FOR CAD (HLD AND FAMILY HX) AND ALSO HAS A HX OF GERD.  SHE REPORTS THE PAIN IS SIMILAR TO GERD AND REQUEST A GI COCKTAIL.  DUE TO PATIENTS PAIN AND RF, I HAVE ORDERED A STRESS TEST IN THE ED.  PT REPORTS THAT SHE DOES NOT WANT A STRESS TEST TODAY BECAUSE SHE HAS ONE SCHEDULED IN June.  SHE IS REFUSING STRESS TEST TODAY.  PT IS PAIN FREE.  TROP X 2 NEG.   PT IS LOW RISK BY [facility] CRITERIA AND WILL BE D/C HOME S/P 2 NEG TROP.  NO ACUTE FINDINGS ON CXR OR EKG.  PT HAS SCHEDULED F/U WITH PCP FOR EVALUATION, LABS AND STRESS TEST.  SHE WILL F/U AS SCHEDULED AND RETURN TO ED IF SYMPTOMS WORSEN.                    ED Course     Clinical Impression:   The primary encounter diagnosis was Gastroesophageal reflux disease, esophagitis presence not specified. A diagnosis of Chest pain was also pertinent to this visit.    Disposition:   Disposition: Discharged  Condition: Stable       Dee Dee Antunez MD  05/15/17 5306

## 2017-05-15 NOTE — ED AVS SNAPSHOT
[facility] [address]  [place] [address]               Rachael June   5/15/2017 10:02 AM   ED    Description:  Female : 1955   Department:  [facility]           Your Care was Coordinated By:     Provider Role From To    Dee Dee Antunez MD Attending Provider 05/15/17 1015 --      Reason for Visit     Chest Pain           Diagnoses this Visit        Comments    Gastroesophageal reflux disease, esophagitis presence not specified    -  Primary     Chest pain           ED Disposition     None           To Do List           Follow-up Information     Schedule an appointment as soon as possible for a visit with Marina Haas MD.    Specialty:  Internal Medicine    Contact information:    [address]  [facility] [address]  [phone]        [facility]     [facility] -  Assistance  Unless otherwise directed by your provider, please contact [facility], our nurse care line that is available for  assistance.     Registered nurses in the [facility] provide: appointment scheduling, clinical advisement, health education, and other advisory services.  Call: [phone] (toll free)               Medications           Message regarding Medications     Verify the changes and/or additions to your medication regime listed below are the same as discussed with your clinician today.  If any of these changes or additions are incorrect, please notify your healthcare provider.        These medications were administered today        Dose Freq    aspirin tablet 325 mg 325 mg ED 1 Time    Sig: Take 1 tablet (325 mg total) by mouth ED 1 Time.    Class: Normal    Route: Oral    Cosign for Ordering: Accepted by Dee Dee Antunez MD on 5/15/2017 10:31 AM    (pyxis) gi cocktail (mylanta 30 mL, lidocaine 2 % viscous 10 mL, dicyclomine 10 mL) 50 mL  ED 1 Time    Sig: Take by mouth ED 1 Time.     Class: Normal    Route: Oral      STOP taking these medications     hydrocodone-acetaminophen 5-325mg (NORCO) 5-325 mg per tablet Take 1 tablet by mouth every 6 (six) hours as needed for Pain.    azithromycin (Z-MARIA ANTONIA) 250 MG tablet 2 tabs on day 1 then 1 tab on days 2-5    benzonatate (TESSALON) 200 MG capsule Take 1 capsule (200 mg total) by mouth every evening.           Verify that the below list of medications is an accurate representation of the medications you are currently taking.  If none reported, the list may be blank. If incorrect, please contact your healthcare provider. Carry this list with you in case of emergency.           Current Medications     (pyxis) gi cocktail (mylanta 30 mL, lidocaine 2 % viscous 10 mL, dicyclomine 10 mL) 50 mL Take by mouth ED 1 Time.    aspirin (ECOTRIN) 81 MG EC tablet Take 81 mg by mouth once daily.    aspirin tablet 325 mg Take 1 tablet (325 mg total) by mouth ED 1 Time.    b complex vitamins capsule Take 1 capsule by mouth once daily.    biotin 1 mg tablet Take 1,000 mcg by mouth 3 (three) times daily.    CALCIUM CARBONATE (CALCIUM 600 ORAL) Take by mouth.    esomeprazole (NEXIUM) 40 MG capsule Take 1 capsule (40 mg total) by mouth before breakfast.    fish oil-omega-3 fatty acids 300-1,000 mg capsule Take 2 g by mouth once daily.    fluticasone (FLONASE) 50 mcg/actuation nasal spray 2 sprays by Each Nare route once daily.    pravastatin (PRAVACHOL) 20 MG tablet Take 1 tablet (20 mg total) by mouth every evening.    valacyclovir (VALTREX) 1000 MG tablet Take 1 tablet (1,000 mg total) by mouth 2 (two) times daily.    vitamin D 1000 units Tab Take 185 mg by mouth once daily.           Clinical Reference Information           Your Vitals Were     BP Pulse Temp Resp Height Weight    135/63 (BP Location: Left arm, Patient Position: Lying, BP Method: Automatic) 71 97.9 °F (36.6 °C) (Oral) 15 5' (1.524 m) 68 kg (150 lb)    SpO2 BMI             98% 29.29 kg/m2         Allergies  as of 5/15/2017        Reactions    Adhesive Tape-silicones     Pcn [Penicillins]       Immunizations Administered on Date of Encounter - 5/15/2017     None      ED Micro, Lab, POCT     Start Ordered       Status Ordering Provider    05/15/17 1200 05/15/17 0933  Troponin I #2  Every 3 hours      Final result     05/15/17 1200 05/15/17 1030    Every 3 hours,   Status:  Canceled      Canceled     05/15/17 1030 05/15/17 1030    Once,   Status:  Canceled      Canceled     05/15/17 1030 05/15/17 1030  D dimer, quantitative  STAT      Final result     05/15/17 0934 05/15/17 0933  CBC auto differential  STAT      Final result     05/15/17 0934 05/15/17 0933  Comprehensive metabolic panel  STAT      Final result     05/15/17 0934 05/15/17 0933  Troponin I #1  Once      Final result     05/15/17 0934 05/15/17 0933  B-Type natriuretic peptide (BNP)  STAT      Final result       ED Imaging Orders     Start Ordered       Status Ordering Provider    05/15/17 1030 05/15/17 1030    1 time imaging,   Status:  Canceled      Canceled     05/15/17 0934 05/15/17 0933  X-Ray Chest AP Portable  1 time imaging      Final result         Discharge Instructions         Noncardiac Chest Pain    Based on your visit today, the health care provider doesnt know what is causing your chest pain. In most cases, people who come to the emergency department with chest pain dont have a problem with their heart. Instead, the pain is caused by other conditions. These may be problems with the lungs, muscles, bones, digestive tract, nerves, or mental health.  Lung problems  · Inflammation around the lungs (pleurisy)  · Collapsed lung (pneumothorax)  · Fluid around the lungs (pleural effusion)  · Lung cancer. This is a rare cause of chest pain.  Muscle or bone problems  · Inflamed cartilage between the ribs (pleurisy)  · Fibromyalgia  · Rheumatoid arthritis  Digestive system problems  · Reflux  · Stomach ulcer  · Spasms of the esophagus  · Gall  stones  · Gallbladder inflammation  Mental health conditions  · Panic or anxiety attacks  · Emotional distress  Your condition doesnt seem serious and your pain doesnt appear to be coming from your heart. But sometimes the signs of a serious problem take more time to appear. Watch for the warning signs listed below.  Home care  Follow these guidelines when caring for yourself at home:  · Rest today and avoid strenuous activity.  · Take any prescribed medicine as directed.  Follow-up care  Follow up with your health care provider, or as advised, if you dont start to feel better within 24 hours.  When to seek medical advice  Call your health care provider right away if any of these occur:  · A change in the type of pain. Call if it feels different, becomes more serious, lasts longer, or begins to spread into your shoulder, arm, neck, jaw, or back.  · Shortness of breath  · You feel more pain when you breathe  · Cough with dark-colored mucus or blood  · Weakness, dizziness, or fainting  · Fever of 100.4ºF (38ºC) or higher, or as directed by your health care provider  · Swelling, pain, or redness in one leg  Date Last Reviewed: 11/24/2014  © 0317-7090 [employer]. [address] [place] [address], [place] [address]. All rights reserved. This information is not intended as a substitute for professional medical care. Always follow your healthcare professional's instructions.          Tips to Control Acid Reflux    To control acid reflux, youll need to make some basic diet and lifestyle changes. The simple steps outlined below may be all youll need to ease discomfort.  Watch what you eat  · Avoid fatty foods and spicy foods.  · Eat fewer acidic foods, such as citrus and tomato-based foods. These can increase symptoms.  · Limit drinking alcohol, caffeine, and fizzy beverages. All increase acid reflux.  · Try limiting chocolate, peppermint, and spearmint. These can worsen acid reflux in some people.  Watch when you  eat  · Avoid lying down for 3 hours after eating.  · Do not snack before going to bed.  Raise your head  Raising your head and upper body by 4 to 6 inches helps limit reflux when youre lying down. Put blocks under the head of your bed frame to raise it.  Other changes  · Lose weight, if you need to  · Dont exercise near bedtime  · Avoid tight-fitting clothes  · Limit aspirin and ibuprofen  · Stop smoking   Date Last Reviewed: 7/1/2016  © 1210-5786 [employer]. [address] [place] [address]. All rights reserved. This information is not intended as a substitute for professional medical care. Always follow your healthcare professional's instructions.          Your Scheduled Appointments     May 31, 2017  8:30 AM CDT   Established Patient Visit with MD [facility] ([facility] )    [address]  [place] [address] [phone]            Jun 06, 2017  8:00 AM CDT   Physical with Marina Haas MD   [facility] [address] ([facility])    [address]  [address]  [facility] [address] [phone]               [facility] complies with applicable Federal civil rights laws and does not discriminate on the basis of race, color, national origin, age, disability, or sex.        Language Assistance Services     ATTENTION: Language assistance services are available, free of charge. Please call [phone].      ATENCIÓN: Si habla español, tiene a zhang disposición servicios gratuitos de asistencia lingüística. Llame al [phone].     CHÚ Ý: N?u b?n nói Ti?ng Vi?t, có các d?ch v? h? tr? ngôn ng? mi?n phí dành cho b?n. G?i s? [phone].

## 2017-05-15 NOTE — DISCHARGE INSTRUCTIONS
Noncardiac Chest Pain    Based on your visit today, the health care provider doesnt know what is causing your chest pain. In most cases, people who come to the emergency department with chest pain dont have a problem with their heart. Instead, the pain is caused by other conditions. These may be problems with the lungs, muscles, bones, digestive tract, nerves, or mental health.  Lung problems  · Inflammation around the lungs (pleurisy)  · Collapsed lung (pneumothorax)  · Fluid around the lungs (pleural effusion)  · Lung cancer. This is a rare cause of chest pain.  Muscle or bone problems  · Inflamed cartilage between the ribs (pleurisy)  · Fibromyalgia  · Rheumatoid arthritis  Digestive system problems  · Reflux  · Stomach ulcer  · Spasms of the esophagus  · Gall stones  · Gallbladder inflammation  Mental health conditions  · Panic or anxiety attacks  · Emotional distress  Your condition doesnt seem serious and your pain doesnt appear to be coming from your heart. But sometimes the signs of a serious problem take more time to appear. Watch for the warning signs listed below.  Home care  Follow these guidelines when caring for yourself at home:  · Rest today and avoid strenuous activity.  · Take any prescribed medicine as directed.  Follow-up care  Follow up with your health care provider, or as advised, if you dont start to feel better within 24 hours.  When to seek medical advice  Call your health care provider right away if any of these occur:  · A change in the type of pain. Call if it feels different, becomes more serious, lasts longer, or begins to spread into your shoulder, arm, neck, jaw, or back.  · Shortness of breath  · You feel more pain when you breathe  · Cough with dark-colored mucus or blood  · Weakness, dizziness, or fainting  · Fever of 100.4ºF (38ºC) or higher, or as directed by your health care provider  · Swelling, pain, or redness in one leg  Date Last Reviewed: 11/24/2014  © 9001-3919  [employer]. [address] [place] [address]. All rights reserved. This information is not intended as a substitute for professional medical care. Always follow your healthcare professional's instructions.          Tips to Control Acid Reflux    To control acid reflux, youll need to make some basic diet and lifestyle changes. The simple steps outlined below may be all youll need to ease discomfort.  Watch what you eat  · Avoid fatty foods and spicy foods.  · Eat fewer acidic foods, such as citrus and tomato-based foods. These can increase symptoms.  · Limit drinking alcohol, caffeine, and fizzy beverages. All increase acid reflux.  · Try limiting chocolate, peppermint, and spearmint. These can worsen acid reflux in some people.  Watch when you eat  · Avoid lying down for 3 hours after eating.  · Do not snack before going to bed.  Raise your head  Raising your head and upper body by 4 to 6 inches helps limit reflux when youre lying down. Put blocks under the head of your bed frame to raise it.  Other changes  · Lose weight, if you need to  · Dont exercise near bedtime  · Avoid tight-fitting clothes  · Limit aspirin and ibuprofen  · Stop smoking   Date Last Reviewed: 7/1/2016  © 3326-6098 [employer]. [address] [place] [address]. All rights reserved. This information is not intended as a substitute for professional medical care. Always follow your healthcare professional's instructions.

## 2017-05-17 ENCOUNTER — PATIENT MESSAGE (OUTPATIENT)
Dept: SURGERY | Facility: CLINIC | Age: 62
End: 2017-05-17

## 2017-05-31 ENCOUNTER — OFFICE VISIT (OUTPATIENT)
Dept: SURGERY | Facility: CLINIC | Age: 62
End: 2017-05-31
Payer: COMMERCIAL

## 2017-05-31 ENCOUNTER — TELEPHONE (OUTPATIENT)
Dept: ENDOSCOPY | Facility: HOSPITAL | Age: 62
End: 2017-05-31

## 2017-05-31 VITALS
BODY MASS INDEX: 30.4 KG/M2 | WEIGHT: 155.63 LBS | SYSTOLIC BLOOD PRESSURE: 140 MMHG | HEART RATE: 68 BPM | DIASTOLIC BLOOD PRESSURE: 67 MMHG

## 2017-05-31 DIAGNOSIS — K62.5 RECTAL BLEEDING: Primary | ICD-10-CM

## 2017-05-31 DIAGNOSIS — Z12.11 SPECIAL SCREENING FOR MALIGNANT NEOPLASMS, COLON: Primary | ICD-10-CM

## 2017-05-31 PROCEDURE — 99999 PR PBB SHADOW E&M-EST. PATIENT-LVL II: CPT | Mod: PBBFAC,,, | Performed by: COLON & RECTAL SURGERY

## 2017-05-31 PROCEDURE — 99213 OFFICE O/P EST LOW 20 MIN: CPT | Mod: S$GLB,,, | Performed by: COLON & RECTAL SURGERY

## 2017-05-31 RX ORDER — POLYETHYLENE GLYCOL 3350, SODIUM SULFATE ANHYDROUS, SODIUM BICARBONATE, SODIUM CHLORIDE, POTASSIUM CHLORIDE 236; 22.74; 6.74; 5.86; 2.97 G/4L; G/4L; G/4L; G/4L; G/4L
4 POWDER, FOR SOLUTION ORAL ONCE
Qty: 4000 ML | Refills: 0 | Status: SHIPPED | OUTPATIENT
Start: 2017-05-31 | End: 2017-05-31

## 2017-05-31 NOTE — PROGRESS NOTES
"Patient ID:  Rachael June is a 61 y.o. female     Chief Complaint:   Chief Complaint   Patient presents with    Hemorrhoids        HPI: Continues to have rectal bledeidng with BMs despite fiber. Had RBL x 2 with minimal improvement    c/o painless BRBPR after BM"s - blood on toilet paper & in toilet water,  not mixed with stool. +Constipation - better since she recently started using benefiber & miralax. No abdominal pain, unexplained weight loss, anorexia, recent change in bowel habits, or other constitutional symptoms. SAw Dr Chen last month and was started on fibe, but continues to bled. Also now with some external disease.     Last colonoscopy - July 2015 - 2 small adenomas removed from ascending colon - rec 5 yr follow-up  No family hx of CRC or IBD.    ROS:        Constitutional: No fever, chills, activity or appetite change.      HENT: No hearing loss, facial swelling, neck pain or stiffness.       Eyes: No discharge, itching and visual disturbance.      Respiratory: No apnea, cough, choking or shortness of breath.       Cardiovascular: No leg swelling or chest pain      Gastrointestinal: No abdominal distention or change in bowel habbits     Genitourinary: No dysuria, frequency or flank pain.      Musculoskeletal: No arthralgias or gait problem.      Neurological: No dizziness, seizures or weakness.      Hematological: No adenopathy.      Psychiatric/Behavioral: No hallucinations or behavioral problems.       PE:    APPEARANCE: Well nourished, well developed, in no acute distress.   CHEST: Lungs clear. Normal respiratory effort.  CARDIOVASCULAR: Normal rhythm. No edema.  ABDOMEN: Soft. No tenderness or masses.  Rectum:  External ronit rigt anterior and left posterior, NST, no masses or tenderness  Anoscopy previous visit: Grade 2 int hemorrhoids    Musculoskeletal: Symmetric, normal range of motion and strength.   Neurological: Alert and oriented to person, place, and time. Normal reflexes.   Skin: " Skin is warm and dry.   Psychiatric: Normal mood and affect. Behavior is normal and appropriate.     IMP:  Internal Hemorrhoids, Symptomatic, rectal bleeding      PLAN: Colonoscopy

## 2017-06-06 ENCOUNTER — LAB VISIT (OUTPATIENT)
Dept: LAB | Facility: HOSPITAL | Age: 62
End: 2017-06-06
Attending: INTERNAL MEDICINE
Payer: MEDICAID

## 2017-06-06 ENCOUNTER — OFFICE VISIT (OUTPATIENT)
Dept: INTERNAL MEDICINE | Facility: CLINIC | Age: 62
End: 2017-06-06
Payer: MEDICAID

## 2017-06-06 VITALS
BODY MASS INDEX: 30.04 KG/M2 | HEIGHT: 60 IN | WEIGHT: 153 LBS | SYSTOLIC BLOOD PRESSURE: 130 MMHG | HEART RATE: 72 BPM | DIASTOLIC BLOOD PRESSURE: 75 MMHG

## 2017-06-06 DIAGNOSIS — E55.9 MILD VITAMIN D DEFICIENCY: ICD-10-CM

## 2017-06-06 DIAGNOSIS — A60.00 GENITAL HERPES SIMPLEX, UNSPECIFIED SITE: ICD-10-CM

## 2017-06-06 DIAGNOSIS — Z12.31 SCREENING MAMMOGRAM, ENCOUNTER FOR: ICD-10-CM

## 2017-06-06 DIAGNOSIS — K21.9 GASTROESOPHAGEAL REFLUX DISEASE, ESOPHAGITIS PRESENCE NOT SPECIFIED: ICD-10-CM

## 2017-06-06 DIAGNOSIS — G47.30 SLEEP APNEA, UNSPECIFIED TYPE: ICD-10-CM

## 2017-06-06 DIAGNOSIS — R73.9 HYPERGLYCEMIA: Primary | ICD-10-CM

## 2017-06-06 DIAGNOSIS — E78.5 HYPERLIPIDEMIA, UNSPECIFIED HYPERLIPIDEMIA TYPE: ICD-10-CM

## 2017-06-06 DIAGNOSIS — M85.80 OSTEOPENIA, UNSPECIFIED LOCATION: ICD-10-CM

## 2017-06-06 DIAGNOSIS — R07.9 CHEST PAIN, UNSPECIFIED TYPE: ICD-10-CM

## 2017-06-06 DIAGNOSIS — R73.9 HYPERGLYCEMIA: ICD-10-CM

## 2017-06-06 DIAGNOSIS — H91.90 HEARING LOSS, UNSPECIFIED HEARING LOSS TYPE, UNSPECIFIED LATERALITY: ICD-10-CM

## 2017-06-06 DIAGNOSIS — J32.9 CHRONIC SINUSITIS, UNSPECIFIED LOCATION: ICD-10-CM

## 2017-06-06 DIAGNOSIS — Z13.89 SCREENING FOR BLOOD OR PROTEIN IN URINE: ICD-10-CM

## 2017-06-06 LAB
25(OH)D3+25(OH)D2 SERPL-MCNC: 35 NG/ML
BILIRUB UR QL STRIP: NEGATIVE
CLARITY UR REFRACT.AUTO: CLEAR
COLOR UR AUTO: NORMAL
GLUCOSE UR QL STRIP: NEGATIVE
HGB UR QL STRIP: NEGATIVE
KETONES UR QL STRIP: NEGATIVE
LEUKOCYTE ESTERASE UR QL STRIP: NEGATIVE
NITRITE UR QL STRIP: NEGATIVE
PH UR STRIP: 8 [PH] (ref 5–8)
PROT UR QL STRIP: NEGATIVE
SP GR UR STRIP: 1 (ref 1–1.03)
TSH SERPL DL<=0.005 MIU/L-ACNC: 1.47 UIU/ML
URN SPEC COLLECT METH UR: NORMAL
UROBILINOGEN UR STRIP-ACNC: NEGATIVE EU/DL

## 2017-06-06 PROCEDURE — 99214 OFFICE O/P EST MOD 30 MIN: CPT | Mod: PBBFAC,PO | Performed by: INTERNAL MEDICINE

## 2017-06-06 PROCEDURE — 99999 PR PBB SHADOW E&M-EST. PATIENT-LVL IV: CPT | Mod: PBBFAC,,, | Performed by: INTERNAL MEDICINE

## 2017-06-06 PROCEDURE — 99214 OFFICE O/P EST MOD 30 MIN: CPT | Mod: S$PBB,,, | Performed by: INTERNAL MEDICINE

## 2017-06-06 PROCEDURE — 83036 HEMOGLOBIN GLYCOSYLATED A1C: CPT

## 2017-06-06 PROCEDURE — 82306 VITAMIN D 25 HYDROXY: CPT

## 2017-06-06 PROCEDURE — 36415 COLL VENOUS BLD VENIPUNCTURE: CPT | Mod: PO

## 2017-06-06 PROCEDURE — 84443 ASSAY THYROID STIM HORMONE: CPT

## 2017-06-06 RX ORDER — FLUOCINOLONE ACETONIDE 0.11 MG/ML
OIL AURICULAR (OTIC)
Qty: 1 BOTTLE | Refills: 1 | Status: SHIPPED | OUTPATIENT
Start: 2017-06-06 | End: 2022-07-14

## 2017-06-06 RX ORDER — VALACYCLOVIR HYDROCHLORIDE 1 G/1
1000 TABLET, FILM COATED ORAL DAILY
Qty: 30 TABLET | Refills: 5 | Status: SHIPPED | OUTPATIENT
Start: 2017-06-06 | End: 2022-07-14 | Stop reason: SDUPTHER

## 2017-06-06 RX ORDER — ESOMEPRAZOLE MAGNESIUM 40 MG/1
40 CAPSULE, DELAYED RELEASE ORAL
Qty: 90 CAPSULE | Refills: 3 | Status: SHIPPED | OUTPATIENT
Start: 2017-06-06 | End: 2022-07-14

## 2017-06-06 RX ORDER — FLUTICASONE PROPIONATE 50 MCG
2 SPRAY, SUSPENSION (ML) NASAL DAILY
Qty: 3 BOTTLE | Refills: 3 | Status: SHIPPED | OUTPATIENT
Start: 2017-06-06 | End: 2022-12-06 | Stop reason: SDUPTHER

## 2017-06-06 RX ORDER — PRAVASTATIN SODIUM 20 MG/1
20 TABLET ORAL NIGHTLY
Qty: 90 TABLET | Refills: 3 | Status: SHIPPED | OUTPATIENT
Start: 2017-06-06 | End: 2022-07-14

## 2017-06-07 LAB
ESTIMATED AVG GLUCOSE: 123 MG/DL
HBA1C MFR BLD HPLC: 5.9 %

## 2017-06-08 ENCOUNTER — ANESTHESIA (OUTPATIENT)
Dept: ENDOSCOPY | Facility: HOSPITAL | Age: 62
End: 2017-06-08
Payer: MEDICAID

## 2017-06-08 ENCOUNTER — SURGERY (OUTPATIENT)
Age: 62
End: 2017-06-08

## 2017-06-08 ENCOUNTER — HOSPITAL ENCOUNTER (OUTPATIENT)
Facility: HOSPITAL | Age: 62
Discharge: HOME | End: 2017-06-08
Attending: COLON & RECTAL SURGERY | Admitting: COLON & RECTAL SURGERY
Payer: MEDICAID

## 2017-06-08 ENCOUNTER — ANESTHESIA EVENT (OUTPATIENT)
Dept: ENDOSCOPY | Facility: HOSPITAL | Age: 62
End: 2017-06-08
Payer: MEDICAID

## 2017-06-08 VITALS
OXYGEN SATURATION: 100 % | HEART RATE: 78 BPM | DIASTOLIC BLOOD PRESSURE: 76 MMHG | TEMPERATURE: 99 F | SYSTOLIC BLOOD PRESSURE: 134 MMHG | WEIGHT: 156 LBS | RESPIRATION RATE: 18 BRPM | BODY MASS INDEX: 30.63 KG/M2 | HEIGHT: 60 IN

## 2017-06-08 DIAGNOSIS — K62.5 RECTAL BLEEDING: ICD-10-CM

## 2017-06-08 DIAGNOSIS — Z12.11 SCREENING FOR COLON CANCER: ICD-10-CM

## 2017-06-08 DIAGNOSIS — Z86.010 HISTORY OF COLONIC POLYPS: Primary | ICD-10-CM

## 2017-06-08 PROCEDURE — 25000003 PHARM REV CODE 250: Performed by: NURSE PRACTITIONER

## 2017-06-08 PROCEDURE — 45378 DIAGNOSTIC COLONOSCOPY: CPT | Performed by: COLON & RECTAL SURGERY

## 2017-06-08 PROCEDURE — 45378 DIAGNOSTIC COLONOSCOPY: CPT | Mod: ,,, | Performed by: COLON & RECTAL SURGERY

## 2017-06-08 PROCEDURE — 63600175 PHARM REV CODE 636 W HCPCS: Performed by: NURSE ANESTHETIST, CERTIFIED REGISTERED

## 2017-06-08 PROCEDURE — D9220A PRA ANESTHESIA: Mod: ANES,,, | Performed by: ANESTHESIOLOGY

## 2017-06-08 PROCEDURE — D9220A PRA ANESTHESIA: Mod: CRNA,,, | Performed by: NURSE ANESTHETIST, CERTIFIED REGISTERED

## 2017-06-08 PROCEDURE — 37000009 HC ANESTHESIA EA ADD 15 MINS: Performed by: COLON & RECTAL SURGERY

## 2017-06-08 PROCEDURE — 25000003 PHARM REV CODE 250: Performed by: NURSE ANESTHETIST, CERTIFIED REGISTERED

## 2017-06-08 PROCEDURE — 37000008 HC ANESTHESIA 1ST 15 MINUTES: Performed by: COLON & RECTAL SURGERY

## 2017-06-08 RX ORDER — PROPOFOL 10 MG/ML
VIAL (ML) INTRAVENOUS
Status: DISCONTINUED | OUTPATIENT
Start: 2017-06-08 | End: 2017-06-08

## 2017-06-08 RX ORDER — FENTANYL CITRATE 50 UG/ML
INJECTION, SOLUTION INTRAMUSCULAR; INTRAVENOUS
Status: DISCONTINUED | OUTPATIENT
Start: 2017-06-08 | End: 2017-06-08

## 2017-06-08 RX ORDER — PROPOFOL 10 MG/ML
VIAL (ML) INTRAVENOUS CONTINUOUS PRN
Status: DISCONTINUED | OUTPATIENT
Start: 2017-06-08 | End: 2017-06-08

## 2017-06-08 RX ORDER — SODIUM CHLORIDE 9 MG/ML
INJECTION, SOLUTION INTRAVENOUS CONTINUOUS
Status: DISCONTINUED | OUTPATIENT
Start: 2017-06-08 | End: 2017-06-08 | Stop reason: HOSPADM

## 2017-06-08 RX ORDER — LIDOCAINE HCL/PF 100 MG/5ML
SYRINGE (ML) INTRAVENOUS
Status: DISCONTINUED | OUTPATIENT
Start: 2017-06-08 | End: 2017-06-08

## 2017-06-08 RX ADMIN — FENTANYL CITRATE 50 MCG: 50 INJECTION, SOLUTION INTRAMUSCULAR; INTRAVENOUS at 10:06

## 2017-06-08 RX ADMIN — LIDOCAINE HYDROCHLORIDE 40 MG: 20 INJECTION, SOLUTION INTRAVENOUS at 10:06

## 2017-06-08 RX ADMIN — PROPOFOL 80 MG: 10 INJECTION, EMULSION INTRAVENOUS at 10:06

## 2017-06-08 RX ADMIN — PROPOFOL 150 MCG/KG/MIN: 10 INJECTION, EMULSION INTRAVENOUS at 10:06

## 2017-06-08 RX ADMIN — SODIUM CHLORIDE: 0.9 INJECTION, SOLUTION INTRAVENOUS at 10:06

## 2017-06-08 NOTE — TRANSFER OF CARE
Anesthesia Transfer of Care Note    Patient: Rachael June    Procedure(s) Performed: Procedure(s) (LRB):  COLONOSCOPY (N/A)    Patient location: PACU    Anesthesia Type: general    Transport from OR: Transported from OR on room air with adequate spontaneous ventilation    Post pain: adequate analgesia    Level of consciousness: awake    Nausea/Vomiting: no nausea/vomiting    Complications: none    Transfer of care protocol was followed      Last vitals:   Visit Vitals  BP (!) 152/70 (BP Location: Left arm, Patient Position: Lying, BP Method: Automatic)   Pulse 100   Temp 36.9 °C (98.5 °F) (Oral)   Resp 16   Ht 5' (1.524 m)   Wt 70.8 kg (156 lb)   SpO2 100%   Breastfeeding? No   BMI 30.47 kg/m²

## 2017-06-08 NOTE — ANESTHESIA POSTPROCEDURE EVALUATION
Anesthesia Post Evaluation    Patient: Rachael June    Procedure(s) Performed: Procedure(s) (LRB):  COLONOSCOPY (N/A)    Final Anesthesia Type: general  Patient location during evaluation: PACU  Patient participation: Yes- Able to Participate  Level of consciousness: awake and alert and oriented  Post-procedure vital signs: reviewed and stable  Pain management: adequate  Airway patency: patent  PONV status at discharge: No PONV  Anesthetic complications: no      Cardiovascular status: hemodynamically stable  Respiratory status: unassisted  Hydration status: euvolemic  Follow-up not needed.        Visit Vitals  /76   Pulse 78   Temp 36.9 °C (98.5 °F) (Oral)   Resp 18   Ht 5' (1.524 m)   Wt 70.8 kg (156 lb)   SpO2 100%   Breastfeeding? No   BMI 30.47 kg/m²       Pain/Meseret Score: Pain Assessment Performed: Yes (6/8/2017 10:58 AM)  Presence of Pain: non-verbal indicators absent (6/8/2017 10:58 AM)  Meseret Score: 9 (6/8/2017 10:57 AM)

## 2017-06-08 NOTE — H&P
Colonoscopy History and Physical      Procedure : Colonoscopy    Indications:  personal history of colon polyps  no FHX of CRC. no Changes in Bowel habits. + hematochezia for one year. minimal    Review of patient's allergies indicates:   Allergen Reactions    Adhesive tape-silicones     Pcn [penicillins]        No current facility-administered medications on file prior to encounter.      Current Outpatient Prescriptions on File Prior to Encounter   Medication Sig Dispense Refill    aspirin (ECOTRIN) 81 MG EC tablet Take 81 mg by mouth once daily.      b complex vitamins capsule Take 1 capsule by mouth once daily.      biotin 1 mg tablet Take 1,000 mcg by mouth 3 (three) times daily.      CALCIUM CARBONATE (CALCIUM 600 ORAL) Take by mouth.      fish oil-omega-3 fatty acids 300-1,000 mg capsule Take 2 g by mouth once daily.      multivitamin capsule Take 1 capsule by mouth once daily.      vitamin D 1000 units Tab Take 185 mg by mouth once daily.         Past Medical History:   Diagnosis Date    Abnormal Pap smear of cervix     Condition not found     h/o abnl vaginal cuff biopsy-mild dysplasia    Dysplasia of vagina     vaginal cuff biopsy    Genital herpes, unspecified     GERD (gastroesophageal reflux disease)     Hyperlipidemia     Insulinoma     s/p resection    Special screening for malignant neoplasms, colon 7/6/2015       Past Surgical History:   Procedure Laterality Date    HYSTERECTOMY      fibroids    insulinoma resection      SHOULDER ARTHROSCOPY Left 9/23/2015    Dr Rm        Family History   Problem Relation Age of Onset    Heart disease Mother     Hypertension Mother     Diabetes Mother     Hyperlipidemia Mother     Stroke Father     Diabetes Father     Heart disease Father     Cancer Sister      breast cancer    Diabetes Sister     Cancer Maternal Aunt      ovarian cancer    Diabetes Brother     Diabetes Sister      Heart disease Brother        Social History     Social History    Marital status:      Spouse name: N/A    Number of children: 3    Years of education: N/A     Occupational History    [employer]     Social History Main Topics    Smoking status: Never Smoker    Smokeless tobacco: Never Used    Alcohol use No    Drug use: No    Sexual activity: Yes     Partners: Male     Birth control/ protection: Surgical     Other Topics Concern    Not on file     Social History Narrative    She exercises every day.she has 8 grandchildren.       Review of Systems -   Respiratory ROS: no cough, shortness of breath, or wheezing  Cardiovascular ROS: no chest pain or dyspnea on exertion  Gastrointestinal ROS: no abdominal pain, change in bowel habits, or black or bloody stools  Musculoskeletal ROS: negative  Neurological ROS: no TIA or stroke symptoms    Physical Exam:  General: well developed, well nourished, no distress  Head: normocephalic  Neck: supple, symmetrical, trachea midline  Lungs:  clear to auscultation bilaterally and normal respiratory effort  Heart: regular rate and rhythm, S1, S2 normal, no murmur, rub or gallop  Abdomen: soft, non-tender non-distented; bowel sounds normal; no masses,  no organomegaly  Extremities: no cyanosis or edema, or clubbing     Deep Sedation: Mallampati Score per anesthesia    ASA: III

## 2017-06-08 NOTE — ANESTHESIA PREPROCEDURE EVALUATION
2017  Rachael June is a 61 y.o., female.  Pre-operative evaluation for COLONOSCOPY (N/A)    Chief Complaint: rectal bleeding    PMH:  Dyslipidemia   GERD   History of insulinoma       Past Surgical History:   Procedure Laterality Date    COLONOSCOPY W/ POLYPECTOMY      HYSTERECTOMY      fibroids    insulinoma resection      SHOULDER ARTHROSCOPY Left 2015    Dr Rm          Vital Signs Range (Last 24H):         CBC:     Recent Labs  Lab 05/15/17  1036   WBC 6.05   RBC 4.73   HGB 14.3   HCT 42.1      MCV 89   MCH 30.2   MCHC 34.0       CMP:   Recent Labs  Lab 05/15/17  1036      K 3.9      CO2 25   BUN 9   CREATININE 0.8   GLU 96   CALCIUM 10.0   ALBUMIN 4.3   PROT 8.2   ALKPHOS 107   ALT 20   AST 20   BILITOT 0.8       INR:  No results for input(s): INR, PROTIME, APTT in the last 720 hours.    Invalid input(s): PT      Diagnostic Studies:      EKD Echo:  Anesthesia Evaluation    I have reviewed the Patient Summary Reports.    I have reviewed the Nursing Notes.   I have reviewed the Medications.     Review of Systems  Anesthesia Hx:  No problems with previous Anesthesia    Social:  Non-Smoker, No Alcohol Use    Cardiovascular:  Cardiovascular Normal     Pulmonary:  Pulmonary Normal    Neurological:  Neurology Normal        Physical Exam  General:  Well nourished    Airway/Jaw/Neck:  Airway Findings: Mouth Opening: Normal Tongue: Normal  General Airway Assessment: Good  Mallampati: I  TM Distance: Normal, at least 6 cm  Jaw/Neck Findings:  Neck ROM: Normal ROM      Dental:  Dental Findings: In tact   Chest/Lungs:  Chest/Lungs Findings: Clear to auscultation, Normal Respiratory Rate     Heart/Vascular:  Heart Findings: Rate: Normal  Rhythm: Regular Rhythm  Sounds: Normal        Mental Status:  Mental Status Findings:  Cooperative, Alert and Oriented          Anesthesia Plan  Type of Anesthesia, risks & benefits discussed:  Anesthesia Type:  general  Patient's Preference:   Intra-op Monitoring Plan:   Intra-op Monitoring Plan Comments:   Post Op Pain Control Plan:   Post Op Pain Control Plan Comments:   Induction:   IV  Beta Blocker:  Patient is not currently on a Beta-Blocker (No further documentation required).       Informed Consent: Patient understands risks and agrees with Anesthesia plan.  Questions answered. Anesthesia consent signed with patient.  ASA Score: 2     Day of Surgery Review of History & Physical:    H&P update referred to the surgeon.         Ready For Surgery From Anesthesia Perspective.

## 2017-06-08 NOTE — PATIENT INSTRUCTIONS
Discharge Summary/Instructions for after Colonoscopy without   Biopsy/Polypectomy  Patient Name: Rachael June  Patient MRN: [id number]  Patient YOB: 1955 Thursday, June 08, 2017    Chadd Davis MD  RESTRICTIONS ON ACTIVITY:  - Do not drive a car or operate machinery until the day after the procedure.      - The following day: return to full activity including work.  - Diet: Eat and drink normally unless instructed otherwise.  TREATMENT FOR COMMON SIDE EFFECTS:  - Mild abdominal pain and bloating or excessive gas: walk, eat lightly, and   use a heating pad.  SYMPTOMS TO WATCH FOR AND REPORT TO YOUR PHYSICIAN:  1. Severe abdominal pain.  2. Fever greater than 101 degrees F within 24 hours after a procedure.  3. A large amount of rectal bleeding. (A small amount of blood from the   rectum is not serious, especially if hemorrhoids are present.  3.  Because air was put into your colon during the procedure, expelling   large amounts of air from your rectum is normal.  4.  You may not have a bowel movement for 1-3 days because of the   colonoscopy prep.  This is normal.  5.  Call you Doctor or go to the emergency room if you notice any of the   following:   Chills and/or fever over 101   Persistent vomiting   Severe abdominal pain, other than gas cramps   Severe chest pain   Black, tarry stools   Any bleeding - exceeding one cup  Your doctor recommends these additional instructions:  You are being discharged to home.   Eat a high fiber diet daily.   Your physician has recommended a repeat colonoscopy in five years for   surveillance.  If you have any questions or problems, please call your physician.  COLON AND RECTAL SURGERY OFFICE:  [phone]  EMERGENCY PHONE NUMBER: [phone]  Chadd Davis MD  6/8/2017 10:53:54 AM  This report has been verified and signed electronically.

## 2017-06-10 DIAGNOSIS — E78.5 HYPERLIPIDEMIA: ICD-10-CM

## 2017-06-10 RX ORDER — PRAVASTATIN SODIUM 20 MG/1
TABLET ORAL
Qty: 90 TABLET | Refills: 4 | Status: SHIPPED | OUTPATIENT
Start: 2017-06-10 | End: 2022-07-14 | Stop reason: SDUPTHER

## 2017-06-12 ENCOUNTER — TELEPHONE (OUTPATIENT)
Dept: SLEEP MEDICINE | Facility: OTHER | Age: 62
End: 2017-06-12

## 2017-06-13 ENCOUNTER — TELEPHONE (OUTPATIENT)
Dept: INTERNAL MEDICINE | Facility: CLINIC | Age: 62
End: 2017-06-13

## 2017-06-13 NOTE — TELEPHONE ENCOUNTER
Please reschedule her appointment for the stress echo to next week as it has not yet been approved.

## 2017-06-13 NOTE — TELEPHONE ENCOUNTER
Good Morning,   Authorization is being denied because there are no clinic notes from 6/6 appt to start request. [facility] takes up to 2 business days to process authorization request. Patient's appt is scheduled for Wednesday, June 14th at 1:30pm. Currently, there are no clinic notes from 6/6 appt to start request. Please consider rescheduling patient's appt to a later date and update clinic notes.       Thanks,   Yuniel Huerta   [id number]

## 2017-06-14 ENCOUNTER — HOSPITAL ENCOUNTER (OUTPATIENT)
Dept: RADIOLOGY | Facility: CLINIC | Age: 62
Discharge: HOME | End: 2017-06-14
Attending: INTERNAL MEDICINE
Payer: MEDICAID

## 2017-06-14 DIAGNOSIS — M85.80 OSTEOPENIA, UNSPECIFIED LOCATION: ICD-10-CM

## 2017-06-14 PROCEDURE — 77080 DXA BONE DENSITY AXIAL: CPT | Mod: TC

## 2017-06-14 PROCEDURE — 77080 DXA BONE DENSITY AXIAL: CPT | Mod: 26,,, | Performed by: INTERNAL MEDICINE

## 2017-06-14 NOTE — TELEPHONE ENCOUNTER
----- Message from Ann Suazo sent at 6/14/2017  8:42 AM CDT -----  Contact: Self  FYI - Patient had to cancel the ECHO/STRESS LAB test that was scheduled for today due to it not being covered by her insurance.    Thanks

## 2017-06-15 ENCOUNTER — TELEPHONE (OUTPATIENT)
Dept: ENDOSCOPY | Facility: HOSPITAL | Age: 62
End: 2017-06-15

## 2017-06-15 NOTE — PROGRESS NOTES
Subjective:       Patient ID: Rachael June is a 61 y.o. female.    Chief Complaint: Annual Exam    HPIPt is about to have her colonoscopy due to persistent rectal bleeding.  She has some chest pain with some mild HIGGINS.  Pt reports some hearing loss and  reports some snoring and stopping breathing while she is sleeping.  Review of Systems   Respiratory: Negative for shortness of breath (PND or orthopnea).    Cardiovascular: Negative for chest pain (arm pain or jaw pain).   Gastrointestinal: Negative for abdominal pain, diarrhea, nausea and vomiting.   Genitourinary: Negative for dysuria.   Neurological: Negative for seizures, syncope and headaches.       Objective:      Physical Exam   Constitutional: She is oriented to person, place, and time. She appears well-developed and well-nourished. No distress.   HENT:   Head: Normocephalic.   Mouth/Throat: Oropharynx is clear and moist.   Neck: Neck supple. No JVD present. No thyromegaly present.   Cardiovascular: Normal rate, regular rhythm, normal heart sounds and intact distal pulses.  Exam reveals no gallop and no friction rub.    No murmur heard.  Pulmonary/Chest: Effort normal and breath sounds normal. She has no wheezes. She has no rales.   Abdominal: Soft. Bowel sounds are normal. She exhibits no distension and no mass. There is no tenderness. There is no rebound and no guarding.   Musculoskeletal: She exhibits no edema.   Lymphadenopathy:     She has no cervical adenopathy.   Neurological: She is alert and oriented to person, place, and time.   Skin: Skin is warm and dry.   Psychiatric: She has a normal mood and affect. Her behavior is normal. Judgment and thought content normal.       Assessment:       1. Hyperglycemia    2. Hyperlipidemia, unspecified hyperlipidemia type    3. Gastroesophageal reflux disease, esophagitis presence not specified    4. Genital herpes simplex, unspecified site    5. Chronic sinusitis, unspecified location    6. Mild  vitamin D deficiency    7. Chest pain, unspecified type    8. Screening mammogram, encounter for    9. Osteopenia, unspecified location    10. Screening for blood or protein in urine    11. Hearing loss, unspecified hearing loss type, unspecified laterality    12. Sleep apnea, unspecified type        Plan:   Hyperglycemia  -     TSH; Future; Expected date: 06/06/2017  -     Hemoglobin A1c; Future; Expected date: 06/06/2017    Hyperlipidemia, unspecified hyperlipidemia type  -     pravastatin (PRAVACHOL) 20 MG tablet; Take 1 tablet (20 mg total) by mouth every evening.  Dispense: 90 tablet; Refill: 3    Gastroesophageal reflux disease, esophagitis presence not specified  -     Case request GI: ESOPHAGOGASTRODUODENOSCOPY (EGD)  -     esomeprazole (NEXIUM) 40 MG capsule; Take 1 capsule (40 mg total) by mouth before breakfast.  Dispense: 90 capsule; Refill: 3    Genital herpes simplex, unspecified site  -     valacyclovir (VALTREX) 1000 MG tablet; Take 1 tablet (1,000 mg total) by mouth once daily.  Dispense: 30 tablet; Refill: 5    Chronic sinusitis, unspecified location  -     fluticasone (FLONASE) 50 mcg/actuation nasal spray; 2 sprays by Each Nare route once daily.  Dispense: 3 Bottle; Refill: 3    Mild vitamin D deficiency  -     Vitamin D; Future; Expected date: 06/06/2017    Chest pain, unspecified type  -     Exercise stress echo with color flow; Future    Screening mammogram, encounter for  -     Mammo Digital Screening Bilat with CAD; Future; Expected date: 06/06/2017    Osteopenia, unspecified location  -     DXA Bone Density Spine And Hip; Future; Expected date: 06/06/2017    Screening for blood or protein in urine  -     Urinalysis    Hearing loss, unspecified hearing loss type, unspecified laterality  -     Comprehensive audiogram; Future  -     Ambulatory consult to ENT    Sleep apnea, unspecified type  -     Polysomnography 4 or more parameters with CPAP; Future    Other orders  -     fluocinolone  acetonide oil 0.01 % Drop; 5 drops twice daily for 1-2 weeks to affected ears  Dispense: 1 Bottle; Refill: 1

## 2017-06-21 ENCOUNTER — HOSPITAL ENCOUNTER (OUTPATIENT)
Dept: CARDIOLOGY | Facility: CLINIC | Age: 62
Discharge: HOME | End: 2017-06-21
Payer: MEDICAID

## 2017-06-21 DIAGNOSIS — R07.9 CHEST PAIN, UNSPECIFIED TYPE: ICD-10-CM

## 2017-06-21 LAB
DIASTOLIC DYSFUNCTION: NO
ESTIMATED PA SYSTOLIC PRESSURE: 25.28
RETIRED EF AND QEF - SEE NOTES: 55 (ref 55–65)
TRICUSPID VALVE REGURGITATION: NORMAL

## 2017-06-21 PROCEDURE — 93320 DOPPLER ECHO COMPLETE: CPT | Mod: 26,S$PBB,, | Performed by: INTERNAL MEDICINE

## 2017-06-21 PROCEDURE — 93325 DOPPLER ECHO COLOR FLOW MAPG: CPT | Mod: PBBFAC | Performed by: INTERNAL MEDICINE

## 2017-06-21 PROCEDURE — 93351 STRESS TTE COMPLETE: CPT | Mod: 26,S$PBB,, | Performed by: INTERNAL MEDICINE

## 2017-06-22 ENCOUNTER — TELEPHONE (OUTPATIENT)
Dept: INTERNAL MEDICINE | Facility: CLINIC | Age: 62
End: 2017-06-22

## 2017-06-22 NOTE — TELEPHONE ENCOUNTER
Prior Authorization was needed for Esomeprazole, PA was initiated on 6-22-17 @ 3:07pm on cover my meds.

## 2017-06-23 ENCOUNTER — CLINICAL SUPPORT (OUTPATIENT)
Dept: AUDIOLOGY | Facility: CLINIC | Age: 62
End: 2017-06-23
Payer: MEDICAID

## 2017-06-23 DIAGNOSIS — H91.90 HEARING LOSS, UNSPECIFIED HEARING LOSS TYPE, UNSPECIFIED LATERALITY: ICD-10-CM

## 2017-06-23 DIAGNOSIS — H90.3 SENSORINEURAL HEARING LOSS (SNHL) OF BOTH EARS: Primary | ICD-10-CM

## 2017-06-23 PROCEDURE — 92557 COMPREHENSIVE HEARING TEST: CPT | Mod: PBBFAC | Performed by: AUDIOLOGIST

## 2017-06-23 PROCEDURE — 92567 TYMPANOMETRY: CPT | Mod: PBBFAC | Performed by: AUDIOLOGIST

## 2017-06-23 NOTE — PROGRESS NOTES
Ms. June was seen in the clinic today for a hearing evaluation.      Audiological testing revealed essentially normal hearing with the exception of a mild high frequency hearing loss, bilaterally. A speech reception threshold was obtained at 15 dBHL, bilaterally. Speech discrimination was 96% in the right ear and 92% in the left ear.    Tympanometry revealed Type A tympanograms, bilaterally.    Recommendations:  1. Otologic evaluation  2. Annual hearing evaluation  3. Noise protection

## 2017-06-27 ENCOUNTER — TELEPHONE (OUTPATIENT)
Dept: INTERNAL MEDICINE | Facility: CLINIC | Age: 62
End: 2017-06-27

## 2017-06-27 NOTE — TELEPHONE ENCOUNTER
Prior authorization has Approved esomeprazole magnesium capsule 40mg DR #30 per 30 days.Please note this is a provisional continuity of care 60 day approval from enrollment date which expires 6/30/2017 after which this member will have to meet criteria required by [facility] for further approval of this medication.Please use preferred drug list (PDL) medications at maximized tolerated doses: Omeprazole 20mg, 40mg capsule, Prilosec OTC 20mg tablet as a prescription, Lansoprazole 15mg capsules (up to 60mg/day), Nexium 24H DR capsule 20mg OTC as a prescription, pantoprazole 20mg or 40mg. Plan guideline CP.PMN.28 (Proton Pump Inhibitors) requires the following: A. Failure of >/= 4 week trial of TWO PDL proton-pump inhibitors at maximum tolerateddoses in the past 180 days, unless contraindicated;B. Request does not exceed FDA approved maximum recommended dose and health planapproved daily quantity limit.. The TRACKING ID = [id number]

## 2017-06-28 ENCOUNTER — HOSPITAL ENCOUNTER (OUTPATIENT)
Dept: SLEEP MEDICINE | Facility: HOSPITAL | Age: 62
Discharge: HOME | End: 2017-06-28
Attending: INTERNAL MEDICINE
Payer: MEDICAID

## 2017-06-28 DIAGNOSIS — G47.30 SLEEP APNEA, UNSPECIFIED TYPE: ICD-10-CM

## 2017-06-28 DIAGNOSIS — G47.33 OSA (OBSTRUCTIVE SLEEP APNEA): ICD-10-CM

## 2017-06-28 PROCEDURE — 95811 POLYSOM 6/>YRS CPAP 4/> PARM: CPT | Mod: 26,,, | Performed by: PSYCHIATRY & NEUROLOGY

## 2017-06-28 PROCEDURE — 95811 PR POLYSOMNOGRAPHY W/CPAP: ICD-10-PCS | Mod: 26,,, | Performed by: PSYCHIATRY & NEUROLOGY

## 2017-06-28 PROCEDURE — 95811 POLYSOM 6/>YRS CPAP 4/> PARM: CPT

## 2017-06-29 NOTE — PROGRESS NOTES
"Pt. tolerated CPAP well.  Optimal pressure of 6 appeared to have eliminated most respiratory events    Low sat of 96% was observed in study. EKG revealed frequent PVC. Small Eson was used during CPAP titration. Pt. responds to titration in a.m. "I hate it"  "

## 2017-07-13 ENCOUNTER — PATIENT MESSAGE (OUTPATIENT)
Dept: INTERNAL MEDICINE | Facility: CLINIC | Age: 62
End: 2017-07-13

## 2017-07-13 ENCOUNTER — HOSPITAL ENCOUNTER (OUTPATIENT)
Dept: RADIOLOGY | Facility: HOSPITAL | Age: 62
Discharge: HOME | End: 2017-07-13
Attending: INTERNAL MEDICINE
Payer: MEDICAID

## 2017-07-13 VITALS — BODY MASS INDEX: 30.63 KG/M2 | WEIGHT: 156 LBS | HEIGHT: 60 IN

## 2017-07-13 DIAGNOSIS — Z12.31 SCREENING MAMMOGRAM, ENCOUNTER FOR: ICD-10-CM

## 2017-07-13 PROCEDURE — 77067 SCR MAMMO BI INCL CAD: CPT | Mod: 26,,, | Performed by: RADIOLOGY

## 2017-07-13 PROCEDURE — 77067 SCR MAMMO BI INCL CAD: CPT | Mod: TC

## 2017-07-13 PROCEDURE — 77063 BREAST TOMOSYNTHESIS BI: CPT | Mod: 26,,, | Performed by: RADIOLOGY

## 2017-08-01 ENCOUNTER — TELEPHONE (OUTPATIENT)
Dept: INTERNAL MEDICINE | Facility: CLINIC | Age: 62
End: 2017-08-01

## 2018-06-06 ENCOUNTER — TELEPHONE (OUTPATIENT)
Dept: INTERNAL MEDICINE | Facility: CLINIC | Age: 63
End: 2018-06-06

## 2018-06-06 NOTE — TELEPHONE ENCOUNTER
Left a message for the patient to call the office back.   To schedule annual appt    Please Advise  Thank You

## 2018-07-02 ENCOUNTER — PATIENT MESSAGE (OUTPATIENT)
Dept: INTERNAL MEDICINE | Facility: CLINIC | Age: 63
End: 2018-07-02

## 2018-10-19 NOTE — TELEPHONE ENCOUNTER
Please call the patient again to schedule her annual appointment.  If she does not respond to this message, please send a [employer] message encouraging her to schedule her appointment and let Dr DOWLING know.

## 2018-11-15 ENCOUNTER — OCCUPATIONAL HEALTH (OUTPATIENT)
Dept: URGENT CARE | Facility: CLINIC | Age: 63
End: 2018-11-15

## 2018-11-15 DIAGNOSIS — Z02.83 ENCOUNTER FOR DRUG SCREENING: Primary | ICD-10-CM

## 2018-11-15 DIAGNOSIS — Z00.00 PHYSICAL EXAM: Primary | ICD-10-CM

## 2018-11-15 PROCEDURE — 99199 UNLISTED SPECIAL SVC PX/RPRT: CPT | Mod: S$GLB,,, | Performed by: NURSE PRACTITIONER

## 2018-11-15 PROCEDURE — 80305 DRUG TEST PRSMV DIR OPT OBS: CPT | Mod: S$GLB,,, | Performed by: NURSE PRACTITIONER

## 2018-11-15 PROCEDURE — 99499 UNLISTED E&M SERVICE: CPT | Mod: S$GLB,,, | Performed by: NURSE PRACTITIONER

## 2019-07-24 NOTE — PROGRESS NOTES
"Patient ID:  Rachael June is a 61 y.o. female     Chief Complaint:   Chief Complaint   Patient presents with    Hemorrhoids        HPI: c/o painless BRBPR after BM"s - blood on toilet paper & in toilet water,  not mixed with stool. +Constipation - better since she recently started using benefiber & miralax. No abdominal pain, unexplained weight loss, anorexia, recent change in bowel habits, or other constitutional symptoms. SAw Dr Chen last month and was started on fibe, but continues to bled. Also now with some external disease.     Last colonoscopy - July 2015 - 2 small adenomas removed from ascending colon - rec 5 yr follow-up  No family hx of CRC or IBD.    ROS:        Constitutional: No fever, chills, activity or appetite change.      HENT: No hearing loss, facial swelling, neck pain or stiffness.       Eyes: No discharge, itching and visual disturbance.      Respiratory: No apnea, cough, choking or shortness of breath.       Cardiovascular: No leg swelling or chest pain      Gastrointestinal: No abdominal distention or change in bowel habbits     Genitourinary: No dysuria, frequency or flank pain.      Musculoskeletal: No arthralgias or gait problem.      Neurological: No dizziness, seizures or weakness.      Hematological: No adenopathy.      Psychiatric/Behavioral: No hallucinations or behavioral problems.       PE:    APPEARANCE: Well nourished, well developed, in no acute distress.   CHEST: Lungs clear. Normal respiratory effort.  CARDIOVASCULAR: Normal rhythm. No edema.  ABDOMEN: Soft. No tenderness or masses.  Rectum:  External ronit rigt anterior and left posterior, NST, no masses or tenderness  Anoscopy: Grade 2 int hemorrhoids    Musculoskeletal: Symmetric, normal range of motion and strength.   Neurological: Alert and oriented to person, place, and time. Normal reflexes.   Skin: Skin is warm and dry.   Psychiatric: Normal mood and affect. Behavior is normal and appropriate.     PROCEDURE:  " Please refer to the [facility] ultrasound report in Ob Procedures for additional information regarding the visit to the [facility]  today  Anoscopy - Diagnostic - Internal Hemorrhoids    With informed consent 2 rubber bands were applied at right posterior and left anterior positions.  The procedure was tolerated well.  The patient was given a handout which discussed their disease process, precautions, and instructions for follow-up and therapy.    IMP:  Internal Hemorrhoids, Symptomatic      PLAN: BAnding as above. Fiber RTC 6 weels Hydrocodone PRN pain.

## 2020-07-13 ENCOUNTER — LAB VISIT (OUTPATIENT)
Dept: PRIMARY CARE CLINIC | Facility: OTHER | Age: 65
End: 2020-07-13
Attending: INTERNAL MEDICINE
Payer: COMMERCIAL

## 2020-07-13 DIAGNOSIS — R05.9 COUGH: ICD-10-CM

## 2020-07-13 PROCEDURE — U0003 INFECTIOUS AGENT DETECTION BY NUCLEIC ACID (DNA OR RNA); SEVERE ACUTE RESPIRATORY SYNDROME CORONAVIRUS 2 (SARS-COV-2) (CORONAVIRUS DISEASE [COVID-19]), AMPLIFIED PROBE TECHNIQUE, MAKING USE OF HIGH THROUGHPUT TECHNOLOGIES AS DESCRIBED BY CMS-2020-01-R: HCPCS

## 2020-07-16 LAB — SARS-COV-2 RNA RESP QL NAA+PROBE: NOT DETECTED

## 2022-04-08 LAB — CRC RECOMMENDATION EXT: NORMAL

## 2022-05-18 ENCOUNTER — TELEPHONE (OUTPATIENT)
Dept: INTERNAL MEDICINE | Facility: CLINIC | Age: 67
End: 2022-05-18
Payer: COMMERCIAL

## 2022-05-18 NOTE — TELEPHONE ENCOUNTER
----- Message from Hedy Cortez sent at 5/18/2022  1:13 PM CDT -----  Type:  Needs Medical Advice    Who Called: Pt  Symptoms (please be specific): NP needs an appt  Would the patient rather a call back or a response via [facility]? Call back  Best Call Back Number: [phone]  Additional Information: n/a

## 2022-06-23 ENCOUNTER — TELEPHONE (OUTPATIENT)
Dept: FAMILY MEDICINE | Facility: CLINIC | Age: 67
End: 2022-06-23
Payer: COMMERCIAL

## 2022-06-23 NOTE — TELEPHONE ENCOUNTER
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 call  Best Call Back Number: [phone]  Additional Information: Please call ASAP

## 2022-06-23 NOTE — TELEPHONE ENCOUNTER
Returned patient's call. Scheduled an appointment to establish with Dr Eckert in July at her request due to insurance changing the first of July.

## 2022-06-30 ENCOUNTER — PATIENT MESSAGE (OUTPATIENT)
Dept: ADMINISTRATIVE | Facility: HOSPITAL | Age: 67
End: 2022-06-30
Payer: COMMERCIAL

## 2022-06-30 ENCOUNTER — PATIENT OUTREACH (OUTPATIENT)
Dept: ADMINISTRATIVE | Facility: HOSPITAL | Age: 67
End: 2022-06-30
Payer: COMMERCIAL

## 2022-06-30 NOTE — PROGRESS NOTES
Care Everywhere updates requested and reviewed.  Immunizations reconciled. Media reports reviewed.  Duplicate HM overrides and  orders removed.  Overdue HM topic chart audit and/or requested.  Overdue lab testing linked to upcoming lab appointments if applies.          Health Maintenance Due   Topic Date Due    Shingles Vaccine (1 of 2) Never done    Mammogram  2018    DEXA Scan  2020    Lipid Panel  2021    COVID-19 Vaccine (4 - Booster for Moderna series) 2022    Colorectal Cancer Screening  2022

## 2022-07-14 ENCOUNTER — OFFICE VISIT (OUTPATIENT)
Dept: FAMILY MEDICINE | Facility: CLINIC | Age: 67
End: 2022-07-14
Payer: COMMERCIAL

## 2022-07-14 VITALS
OXYGEN SATURATION: 98 % | WEIGHT: 166.88 LBS | SYSTOLIC BLOOD PRESSURE: 134 MMHG | TEMPERATURE: 98 F | BODY MASS INDEX: 32.76 KG/M2 | HEIGHT: 60 IN | HEART RATE: 73 BPM | DIASTOLIC BLOOD PRESSURE: 86 MMHG

## 2022-07-14 DIAGNOSIS — E78.5 HYPERLIPIDEMIA: ICD-10-CM

## 2022-07-14 DIAGNOSIS — R73.03 PREDIABETES: ICD-10-CM

## 2022-07-14 DIAGNOSIS — E78.5 HYPERLIPIDEMIA, UNSPECIFIED HYPERLIPIDEMIA TYPE: ICD-10-CM

## 2022-07-14 DIAGNOSIS — Z12.31 BREAST CANCER SCREENING BY MAMMOGRAM: ICD-10-CM

## 2022-07-14 DIAGNOSIS — Z78.0 ASYMPTOMATIC MENOPAUSAL STATE: ICD-10-CM

## 2022-07-14 DIAGNOSIS — Z00.00 ROUTINE GENERAL MEDICAL EXAMINATION AT A HEALTH CARE FACILITY: Primary | ICD-10-CM

## 2022-07-14 DIAGNOSIS — A60.00 GENITAL HERPES SIMPLEX, UNSPECIFIED SITE: ICD-10-CM

## 2022-07-14 PROCEDURE — 3075F SYST BP GE 130 - 139MM HG: CPT | Mod: CPTII,S$GLB,, | Performed by: FAMILY MEDICINE

## 2022-07-14 PROCEDURE — 99387 PR PREVENTIVE VISIT,NEW,65 & OVER: ICD-10-PCS | Mod: 25,S$GLB,, | Performed by: FAMILY MEDICINE

## 2022-07-14 PROCEDURE — 99387 INIT PM E/M NEW PAT 65+ YRS: CPT | Mod: 25,S$GLB,, | Performed by: FAMILY MEDICINE

## 2022-07-14 PROCEDURE — 3079F DIAST BP 80-89 MM HG: CPT | Mod: CPTII,S$GLB,, | Performed by: FAMILY MEDICINE

## 2022-07-14 PROCEDURE — 3288F FALL RISK ASSESSMENT DOCD: CPT | Mod: CPTII,S$GLB,, | Performed by: FAMILY MEDICINE

## 2022-07-14 PROCEDURE — 90677 PCV20 VACCINE IM: CPT | Mod: S$GLB,,, | Performed by: FAMILY MEDICINE

## 2022-07-14 PROCEDURE — 1159F PR MEDICATION LIST DOCUMENTED IN MEDICAL RECORD: ICD-10-PCS | Mod: CPTII,S$GLB,, | Performed by: FAMILY MEDICINE

## 2022-07-14 PROCEDURE — 90677 PNEUMOCOCCAL CONJUGATE VACCINE 20-VALENT: ICD-10-PCS | Mod: S$GLB,,, | Performed by: FAMILY MEDICINE

## 2022-07-14 PROCEDURE — 3288F PR FALLS RISK ASSESSMENT DOCUMENTED: ICD-10-PCS | Mod: CPTII,S$GLB,, | Performed by: FAMILY MEDICINE

## 2022-07-14 PROCEDURE — 3079F PR MOST RECENT DIASTOLIC BLOOD PRESSURE 80-89 MM HG: ICD-10-PCS | Mod: CPTII,S$GLB,, | Performed by: FAMILY MEDICINE

## 2022-07-14 PROCEDURE — 3075F PR MOST RECENT SYSTOLIC BLOOD PRESS GE 130-139MM HG: ICD-10-PCS | Mod: CPTII,S$GLB,, | Performed by: FAMILY MEDICINE

## 2022-07-14 PROCEDURE — 1159F MED LIST DOCD IN RCRD: CPT | Mod: CPTII,S$GLB,, | Performed by: FAMILY MEDICINE

## 2022-07-14 PROCEDURE — 99999 PR PBB SHADOW E&M-EST. PATIENT-LVL IV: ICD-10-PCS | Mod: PBBFAC,,, | Performed by: FAMILY MEDICINE

## 2022-07-14 PROCEDURE — 99999 PR PBB SHADOW E&M-EST. PATIENT-LVL IV: CPT | Mod: PBBFAC,,, | Performed by: FAMILY MEDICINE

## 2022-07-14 PROCEDURE — 90471 IMMUNIZATION ADMIN: CPT | Mod: S$GLB,,, | Performed by: FAMILY MEDICINE

## 2022-07-14 PROCEDURE — 1101F PT FALLS ASSESS-DOCD LE1/YR: CPT | Mod: CPTII,S$GLB,, | Performed by: FAMILY MEDICINE

## 2022-07-14 PROCEDURE — 1101F PR PT FALLS ASSESS DOC 0-1 FALLS W/OUT INJ PAST YR: ICD-10-PCS | Mod: CPTII,S$GLB,, | Performed by: FAMILY MEDICINE

## 2022-07-14 PROCEDURE — 90471 PNEUMOCOCCAL CONJUGATE VACCINE 20-VALENT: ICD-10-PCS | Mod: S$GLB,,, | Performed by: FAMILY MEDICINE

## 2022-07-14 PROCEDURE — 3008F PR BODY MASS INDEX (BMI) DOCUMENTED: ICD-10-PCS | Mod: CPTII,S$GLB,, | Performed by: FAMILY MEDICINE

## 2022-07-14 PROCEDURE — 3008F BODY MASS INDEX DOCD: CPT | Mod: CPTII,S$GLB,, | Performed by: FAMILY MEDICINE

## 2022-07-14 RX ORDER — FAMOTIDINE 20 MG/1
20 TABLET, FILM COATED ORAL DAILY PRN
COMMUNITY
Start: 2022-05-02

## 2022-07-14 RX ORDER — VALACYCLOVIR HYDROCHLORIDE 1 G/1
1000 TABLET, FILM COATED ORAL DAILY
Qty: 45 TABLET | Refills: 3 | Status: SHIPPED | OUTPATIENT
Start: 2022-07-14 | End: 2023-07-24 | Stop reason: SDUPTHER

## 2022-07-14 RX ORDER — PRAVASTATIN SODIUM 40 MG/1
40 TABLET ORAL DAILY
Qty: 90 TABLET | Refills: 3 | Status: SHIPPED | OUTPATIENT
Start: 2022-07-14 | End: 2023-07-24 | Stop reason: SDUPTHER

## 2022-07-14 NOTE — PROGRESS NOTES
(Portions of this note were dictated using voice recognition software and may contain dictation related errors in spelling/grammar/syntax not found on text review)    CC:   Chief Complaint   Patient presents with    Establish Care       HPI: 67 y.o. female     Past Medical History:   Diagnosis Date    Abnormal Pap smear of cervix     Colon polyp     Condition not found     h/o abnl vaginal cuff biopsy-mild dysplasia    Dysplasia of vagina     vaginal cuff biopsy    Genital herpes, unspecified     GERD (gastroesophageal reflux disease)     Hyperlipidemia     Insulinoma     s/p resection    Special screening for malignant neoplasms, colon 7/6/2015       Past Surgical History:   Procedure Laterality Date    COLONOSCOPY N/A 6/8/2017    Procedure: COLONOSCOPY;  Surgeon: Chadd Davis MD;  Location: [address]);  Service: Endoscopy;  Laterality: N/A;    COLONOSCOPY W/ POLYPECTOMY      HYSTERECTOMY      fibroids    insulinoma resection      SHOULDER ARTHROSCOPY Left 9/23/2015    Dr Rm        Family History   Problem Relation Age of Onset    Heart disease Mother     Hypertension Mother     Diabetes Mother     Hyperlipidemia Mother     Stroke Father     Diabetes Father     Heart disease Father     Cancer Sister         breast cancer    Diabetes Sister     Breast cancer Sister     Cancer Maternal Aunt         ovarian cancer    Diabetes Brother     Diabetes Sister     Heart disease Brother        Social History     Tobacco Use    Smoking status: Never Smoker    Smokeless tobacco: Never Used   Substance Use Topics    Alcohol use: No    Drug use: No       Lab Results   Component Value Date    WBC 6.05 05/15/2017    HGB 14.3 05/15/2017    HCT 42.1 05/15/2017    MCV 89 05/15/2017     05/15/2017    CHOL 191 12/02/2016    TRIG 59 12/02/2016    HDL 63 12/02/2016    ALT 20 05/15/2017    AST 20 05/15/2017    BILITOT 0.8 05/15/2017    ALKPHOS 107 05/15/2017     05/15/2017     K 3.9 05/15/2017     05/15/2017    CREATININE 0.8 05/15/2017    ESTGFRAFRICA >60 05/15/2017    EGFRNONAA >60 05/15/2017    CALCIUM 10.0 05/15/2017    ALBUMIN 4.3 05/15/2017    BUN 9 05/15/2017    CO2 25 05/15/2017    TSH 1.472 06/06/2017    INR 1.0 01/15/2015    HGBA1C 5.9 06/06/2017    MICALBCREAT 10.0 10/29/2008    LDLCALC 116.2 12/02/2016    GLU 96 05/15/2017    AVODPMVJ32EY 35 06/06/2017             Vital signs reviewed  Vitals:    07/14/22 0853   BP: 134/86   BP Location: Left arm   Patient Position: Sitting   BP Method: Medium (Manual)   Pulse: 73   Temp: 98.4 °F (36.9 °C)   TempSrc: Oral   SpO2: 98%   Weight: 75.7 kg (166 lb 14.2 oz)   Height: 5' (1.524 m)       Wt Readings from Last 4 Encounters:   07/14/22 75.7 kg (166 lb 14.2 oz)   07/13/17 70.8 kg (156 lb)   06/08/17 70.8 kg (156 lb)   06/06/17 69.4 kg (153 lb)       PE:   APPEARANCE: Well nourished, well developed, in no acute distress.    HEAD: Normocephalic, atraumatic.  EYES: PERRL. EOMI.   Conjunctivae noninjected.  EARS: TM's intact. Light reflex normal. No retraction or perforation  NOSE: Mucosa pink. Airway clear.  MOUTH & THROAT: No tonsillar enlargement. No pharyngeal erythema or exudate.   NECK: Supple with no cervical lymphadenopathy.    CHEST: Good inspiratory effort. Lungs clear to auscultation with no wheezes or crackles.  CARDIOVASCULAR: Normal S1, S2. No rubs, murmurs, or gallops.  ABDOMEN: Bowel sounds normal. Not distended. Soft. No tenderness or masses. No organomegaly.  EXTREMITIES: No edema, cyanosis, or clubbing.      IMPRESSION  No diagnosis found.        PLAN  No orders of the defined types were placed in this encounter.              SCREENINGS      Immunizations:      Tetanus booster - 2016  Pneumoccocal - never had  Shingrix - never had  Covid X3 - due for 2nd booster  Flu - Utd yearly        Age/demographic appropriate health maintenance:    Mammogram due - last 2021 July

## 2022-07-14 NOTE — LETTER
July 14, 2022      [facility]  [address], [address]  [place] [address]  Phone: [phone]  Fax: [phone]       Patient: Rachael June   YOB: 1955  Date of Visit: 07/14/2022    To Whom It May Concern:    Piper June  was at [facility] on 07/14/2022. The patient may return to work/school on 07/14/2022 with no restrictions. If you have any questions or concerns, or if I can be of further assistance, please do not hesitate to contact me.    Sincerely,    Josefa Toussaint MA

## 2022-07-14 NOTE — PROGRESS NOTES
(Portions of this note were dictated using voice recognition software and may contain dictation related errors in spelling/grammar/syntax not found on text review)    CC:   Chief Complaint   Patient presents with    Establish Care       HPI: 67 y.o. female new pt here to establish care, prior followed by SANDI HERNANDEZ on pravastatin 40 mg     GERD: famotidine 20 mg prn, not often.     PreDM: not sure what a1c was but gets checks yearly    Genital HSV: daily suppressive valtrex    AR: on flonase    Intermittent constipation takes miralax    Peripheral edema right leg/foot past few years, have had several investigations over past no prominent findings. Was tried on diuretics but not really helpful. Better in am and more prominent through day. No pain.     KEMAL mild: PSG 2018, doesn't use CPAP]]    Hx of insulinoma 2008, removed and no specific issues since.     No tob or ETOH  Works for [employer] as   Takes care of granddtr    Diet: +indiscretions, +sweets, fast food 1-2 x monthly  Exercise: joined gym but hasn't gone yet.     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      Past Medical History:   Diagnosis Date    Abnormal Pap smear of cervix     Colon polyp     Condition not found     h/o abnl vaginal cuff biopsy-mild dysplasia    Dysplasia of vagina     vaginal cuff biopsy    Genital herpes, unspecified     GERD (gastroesophageal reflux disease)     Hyperlipidemia     Insulinoma     s/p resection    Special screening for malignant neoplasms, colon 7/6/2015       Past Surgical History:   Procedure Laterality Date    COLONOSCOPY N/A 6/8/2017    Procedure: COLONOSCOPY;  Surgeon: Chadd Davis MD;  Location: [address]);  Service: Endoscopy;  Laterality: N/A;    COLONOSCOPY W/ POLYPECTOMY      HYSTERECTOMY      fibroids    insulinoma resection      SHOULDER ARTHROSCOPY Left 9/23/2015    Dr Rm        Family History   Problem Relation Age of Onset    Heart disease Mother     Hypertension Mother      Diabetes Mother     Hyperlipidemia Mother     Stroke Father     Diabetes Father     Heart disease Father     Cancer Sister         breast cancer    Diabetes Sister     Breast cancer Sister     Cancer Maternal Aunt         ovarian cancer    Diabetes Brother     Diabetes Sister     Heart disease Brother        Social History     Tobacco Use    Smoking status: Never Smoker    Smokeless tobacco: Never Used   Substance Use Topics    Alcohol use: No    Drug use: No       Lab Results   Component Value Date    WBC 6.05 05/15/2017    HGB 14.3 05/15/2017    HCT 42.1 05/15/2017    MCV 89 05/15/2017     05/15/2017    CHOL 191 12/02/2016    TRIG 59 12/02/2016    HDL 63 12/02/2016    ALT 20 05/15/2017    AST 20 05/15/2017    BILITOT 0.8 05/15/2017    ALKPHOS 107 05/15/2017     05/15/2017    K 3.9 05/15/2017     05/15/2017    CREATININE 0.8 05/15/2017    ESTGFRAFRICA >60 05/15/2017    EGFRNONAA >60 05/15/2017    CALCIUM 10.0 05/15/2017    ALBUMIN 4.3 05/15/2017    BUN 9 05/15/2017    CO2 25 05/15/2017    TSH 1.472 06/06/2017    INR 1.0 01/15/2015    HGBA1C 5.9 06/06/2017    MICALBCREAT 10.0 10/29/2008    LDLCALC 116.2 12/02/2016    GLU 96 05/15/2017    EVRCHSHX93GC 35 06/06/2017             Vital signs reviewed  Vitals:    07/14/22 0853   BP: 134/86   BP Location: Left arm   Patient Position: Sitting   BP Method: Medium (Manual)   Pulse: 73   Temp: 98.4 °F (36.9 °C)   TempSrc: Oral   SpO2: 98%   Weight: 75.7 kg (166 lb 14.2 oz)   Height: 5' (1.524 m)       Wt Readings from Last 4 Encounters:   07/13/17 70.8 kg (156 lb)   06/08/17 70.8 kg (156 lb)   06/06/17 69.4 kg (153 lb)   05/31/17 70.6 kg (155 lb 10.3 oz)       PE:   APPEARANCE: Well nourished, well developed, in no acute distress.    HEAD: Normocephalic, atraumatic.  EYES: PERRL. EOMI.   Conjunctivae noninjected.  EARS: TM's intact. Light reflex normal. No retraction or perforation  NOSE: Mucosa pink. Airway clear.  MOUTH & THROAT: No  tonsillar enlargement. No pharyngeal erythema or exudate.   NECK: Supple with no cervical lymphadenopathy.    CHEST: Good inspiratory effort. Lungs clear to auscultation with no wheezes or crackles.  CARDIOVASCULAR: Normal S1, S2. No rubs, murmurs, or gallops.  ABDOMEN: Bowel sounds normal. Not distended. Soft. No tenderness or masses. No organomegaly.  EXTREMITIES:  Trace bilateral edema to midtibial region, right more pronounced slightly than left.. No skin changes, no increased warmth. Normal pulses.       IMPRESSION  1. Routine general medical examination at a health care facility    2. Hyperlipidemia, unspecified hyperlipidemia type    3. Breast cancer screening by mammogram    4. Asymptomatic menopausal state    5. Prediabetes    6. Hyperlipidemia    7. Genital herpes simplex, unspecified site            PLAN  Orders Placed This Encounter   Procedures    Mammo Digital Screening Bilat    DXA Bone Density Spine And Hip    (In Office Administered) Pneumococcal Conjugate Vaccine (20 Valent) (IM)    CBC Auto Differential    Comprehensive Metabolic Panel    Lipid Panel    TSH    Hemoglobin A1C       labs above.  Dietary precautions, exercise discussion, this should help with improving leg swelling along with sodium reduction.    Refill pravastatin and Valtrex      Return 1 year or sooner as needed          SCREENINGS      Immunizations:   covid x 3, encourage boost  tdap 2016  PCV today  Zoster, can get at pharmacy    Age/demographic appropriate health maintenance:  MMG July 2021, due to be scheduled  DEXA 2020 reportedly normal.  To be scheduled  colonoscopy 2017, internal hemorrhoids otherwise normal. Rpt April 2022 (2 polyps removed.--[facility])

## 2022-07-15 ENCOUNTER — LAB VISIT (OUTPATIENT)
Dept: LAB | Facility: HOSPITAL | Age: 67
End: 2022-07-15
Attending: FAMILY MEDICINE
Payer: COMMERCIAL

## 2022-07-15 DIAGNOSIS — Z00.00 ROUTINE GENERAL MEDICAL EXAMINATION AT A HEALTH CARE FACILITY: ICD-10-CM

## 2022-07-15 DIAGNOSIS — Z78.0 ASYMPTOMATIC MENOPAUSAL STATE: ICD-10-CM

## 2022-07-15 DIAGNOSIS — Z12.31 BREAST CANCER SCREENING BY MAMMOGRAM: ICD-10-CM

## 2022-07-15 DIAGNOSIS — E78.5 HYPERLIPIDEMIA, UNSPECIFIED HYPERLIPIDEMIA TYPE: ICD-10-CM

## 2022-07-15 LAB
ALBUMIN SERPL BCP-MCNC: 4.1 G/DL (ref 3.5–5.2)
ALP SERPL-CCNC: 102 U/L (ref 55–135)
ALT SERPL W/O P-5'-P-CCNC: 28 U/L (ref 10–44)
ANION GAP SERPL CALC-SCNC: 11 MMOL/L (ref 8–16)
AST SERPL-CCNC: 22 U/L (ref 10–40)
BASOPHILS # BLD AUTO: 0.03 K/UL (ref 0–0.2)
BASOPHILS NFR BLD: 0.5 % (ref 0–1.9)
BILIRUB SERPL-MCNC: 0.7 MG/DL (ref 0.1–1)
BUN SERPL-MCNC: 10 MG/DL (ref 8–23)
CALCIUM SERPL-MCNC: 9.8 MG/DL (ref 8.7–10.5)
CHLORIDE SERPL-SCNC: 103 MMOL/L (ref 95–110)
CHOLEST SERPL-MCNC: 178 MG/DL (ref 120–199)
CHOLEST/HDLC SERPL: 2.6 {RATIO} (ref 2–5)
CO2 SERPL-SCNC: 27 MMOL/L (ref 23–29)
CREAT SERPL-MCNC: 0.8 MG/DL (ref 0.5–1.4)
DIFFERENTIAL METHOD: NORMAL
EOSINOPHIL # BLD AUTO: 0.1 K/UL (ref 0–0.5)
EOSINOPHIL NFR BLD: 0.8 % (ref 0–8)
ERYTHROCYTE [DISTWIDTH] IN BLOOD BY AUTOMATED COUNT: 12.9 % (ref 11.5–14.5)
EST. GFR  (AFRICAN AMERICAN): >60 ML/MIN/1.73 M^2
EST. GFR  (NON AFRICAN AMERICAN): >60 ML/MIN/1.73 M^2
ESTIMATED AVG GLUCOSE: 123 MG/DL (ref 68–131)
GLUCOSE SERPL-MCNC: 100 MG/DL (ref 70–110)
HBA1C MFR BLD: 5.9 % (ref 4–5.6)
HCT VFR BLD AUTO: 41.9 % (ref 37–48.5)
HDLC SERPL-MCNC: 69 MG/DL (ref 40–75)
HDLC SERPL: 38.8 % (ref 20–50)
HGB BLD-MCNC: 13.6 G/DL (ref 12–16)
IMM GRANULOCYTES # BLD AUTO: 0.01 K/UL (ref 0–0.04)
IMM GRANULOCYTES NFR BLD AUTO: 0.2 % (ref 0–0.5)
LDLC SERPL CALC-MCNC: 98.2 MG/DL (ref 63–159)
LYMPHOCYTES # BLD AUTO: 2.1 K/UL (ref 1–4.8)
LYMPHOCYTES NFR BLD: 33.9 % (ref 18–48)
MCH RBC QN AUTO: 30.9 PG (ref 27–31)
MCHC RBC AUTO-ENTMCNC: 32.5 G/DL (ref 32–36)
MCV RBC AUTO: 95 FL (ref 82–98)
MONOCYTES # BLD AUTO: 0.6 K/UL (ref 0.3–1)
MONOCYTES NFR BLD: 10.2 % (ref 4–15)
NEUTROPHILS # BLD AUTO: 3.4 K/UL (ref 1.8–7.7)
NEUTROPHILS NFR BLD: 54.4 % (ref 38–73)
NONHDLC SERPL-MCNC: 109 MG/DL
NRBC BLD-RTO: 0 /100 WBC
PLATELET # BLD AUTO: 254 K/UL (ref 150–450)
PMV BLD AUTO: 12.1 FL (ref 9.2–12.9)
POTASSIUM SERPL-SCNC: 4.6 MMOL/L (ref 3.5–5.1)
PROT SERPL-MCNC: 7.1 G/DL (ref 6–8.4)
RBC # BLD AUTO: 4.4 M/UL (ref 4–5.4)
SODIUM SERPL-SCNC: 141 MMOL/L (ref 136–145)
TRIGL SERPL-MCNC: 54 MG/DL (ref 30–150)
TSH SERPL DL<=0.005 MIU/L-ACNC: 1.26 UIU/ML (ref 0.4–4)
WBC # BLD AUTO: 6.19 K/UL (ref 3.9–12.7)

## 2022-07-15 PROCEDURE — 80061 LIPID PANEL: CPT | Performed by: FAMILY MEDICINE

## 2022-07-15 PROCEDURE — 85025 COMPLETE CBC W/AUTO DIFF WBC: CPT | Performed by: FAMILY MEDICINE

## 2022-07-15 PROCEDURE — 84443 ASSAY THYROID STIM HORMONE: CPT | Performed by: FAMILY MEDICINE

## 2022-07-15 PROCEDURE — 36415 COLL VENOUS BLD VENIPUNCTURE: CPT | Mod: PO | Performed by: FAMILY MEDICINE

## 2022-07-15 PROCEDURE — 80053 COMPREHEN METABOLIC PANEL: CPT | Performed by: FAMILY MEDICINE

## 2022-07-15 PROCEDURE — 83036 HEMOGLOBIN GLYCOSYLATED A1C: CPT | Performed by: FAMILY MEDICINE

## 2022-08-01 ENCOUNTER — IMMUNIZATION (OUTPATIENT)
Dept: PRIMARY CARE CLINIC | Facility: CLINIC | Age: 67
End: 2022-08-01
Payer: COMMERCIAL

## 2022-08-01 DIAGNOSIS — Z23 NEED FOR VACCINATION: Primary | ICD-10-CM

## 2022-08-01 PROCEDURE — 0064A COVID-19, MRNA, LNP-S, PF, 100 MCG/0.25 ML DOSE VACCINE (MODERNA BOOSTER): CPT | Mod: CV19,PBBFAC | Performed by: INTERNAL MEDICINE

## 2022-08-24 ENCOUNTER — HOSPITAL ENCOUNTER (EMERGENCY)
Facility: HOSPITAL | Age: 67
Discharge: HOME | End: 2022-08-24
Attending: EMERGENCY MEDICINE
Payer: COMMERCIAL

## 2022-08-24 ENCOUNTER — HOSPITAL ENCOUNTER (OUTPATIENT)
Dept: RADIOLOGY | Facility: HOSPITAL | Age: 67
Discharge: HOME | End: 2022-08-24
Attending: FAMILY MEDICINE
Payer: COMMERCIAL

## 2022-08-24 ENCOUNTER — PATIENT MESSAGE (OUTPATIENT)
Dept: FAMILY MEDICINE | Facility: CLINIC | Age: 67
End: 2022-08-24
Payer: COMMERCIAL

## 2022-08-24 VITALS
TEMPERATURE: 98 F | OXYGEN SATURATION: 97 % | RESPIRATION RATE: 18 BRPM | HEART RATE: 65 BPM | DIASTOLIC BLOOD PRESSURE: 72 MMHG | SYSTOLIC BLOOD PRESSURE: 152 MMHG

## 2022-08-24 DIAGNOSIS — E78.5 HYPERLIPIDEMIA, UNSPECIFIED HYPERLIPIDEMIA TYPE: ICD-10-CM

## 2022-08-24 DIAGNOSIS — Z12.31 BREAST CANCER SCREENING BY MAMMOGRAM: ICD-10-CM

## 2022-08-24 DIAGNOSIS — Z00.00 ROUTINE GENERAL MEDICAL EXAMINATION AT A HEALTH CARE FACILITY: ICD-10-CM

## 2022-08-24 DIAGNOSIS — Z78.0 ASYMPTOMATIC MENOPAUSAL STATE: ICD-10-CM

## 2022-08-24 DIAGNOSIS — R07.9 CHEST PAIN, UNSPECIFIED TYPE: Primary | ICD-10-CM

## 2022-08-24 DIAGNOSIS — R07.9 CHEST PAIN: ICD-10-CM

## 2022-08-24 LAB
ALBUMIN SERPL BCP-MCNC: 4.1 G/DL (ref 3.5–5.2)
ALP SERPL-CCNC: 109 U/L (ref 55–135)
ALT SERPL W/O P-5'-P-CCNC: 18 U/L (ref 10–44)
ANION GAP SERPL CALC-SCNC: 10 MMOL/L (ref 8–16)
AST SERPL-CCNC: 18 U/L (ref 10–40)
BASOPHILS # BLD AUTO: 0.03 K/UL (ref 0–0.2)
BASOPHILS NFR BLD: 0.6 % (ref 0–1.9)
BILIRUB SERPL-MCNC: 0.7 MG/DL (ref 0.1–1)
BUN SERPL-MCNC: 9 MG/DL (ref 8–23)
CALCIUM SERPL-MCNC: 9.7 MG/DL (ref 8.7–10.5)
CHLORIDE SERPL-SCNC: 107 MMOL/L (ref 95–110)
CO2 SERPL-SCNC: 25 MMOL/L (ref 23–29)
CREAT SERPL-MCNC: 0.7 MG/DL (ref 0.5–1.4)
DIFFERENTIAL METHOD: ABNORMAL
EOSINOPHIL # BLD AUTO: 0 K/UL (ref 0–0.5)
EOSINOPHIL NFR BLD: 0.6 % (ref 0–8)
ERYTHROCYTE [DISTWIDTH] IN BLOOD BY AUTOMATED COUNT: 12.6 % (ref 11.5–14.5)
EST. GFR  (NO RACE VARIABLE): >60 ML/MIN/1.73 M^2
GLUCOSE SERPL-MCNC: 94 MG/DL (ref 70–110)
HCT VFR BLD AUTO: 40.1 % (ref 37–48.5)
HGB BLD-MCNC: 13.3 G/DL (ref 12–16)
IMM GRANULOCYTES # BLD AUTO: 0.01 K/UL (ref 0–0.04)
IMM GRANULOCYTES NFR BLD AUTO: 0.2 % (ref 0–0.5)
LYMPHOCYTES # BLD AUTO: 2.6 K/UL (ref 1–4.8)
LYMPHOCYTES NFR BLD: 50 % (ref 18–48)
MAGNESIUM SERPL-MCNC: 2.1 MG/DL (ref 1.6–2.6)
MCH RBC QN AUTO: 30.2 PG (ref 27–31)
MCHC RBC AUTO-ENTMCNC: 33.2 G/DL (ref 32–36)
MCV RBC AUTO: 91 FL (ref 82–98)
MONOCYTES # BLD AUTO: 0.5 K/UL (ref 0.3–1)
MONOCYTES NFR BLD: 8.7 % (ref 4–15)
NEUTROPHILS # BLD AUTO: 2.1 K/UL (ref 1.8–7.7)
NEUTROPHILS NFR BLD: 39.9 % (ref 38–73)
NRBC BLD-RTO: 0 /100 WBC
PLATELET # BLD AUTO: 246 K/UL (ref 150–450)
PMV BLD AUTO: 11.1 FL (ref 9.2–12.9)
POTASSIUM SERPL-SCNC: 3.9 MMOL/L (ref 3.5–5.1)
PROT SERPL-MCNC: 7.6 G/DL (ref 6–8.4)
RBC # BLD AUTO: 4.41 M/UL (ref 4–5.4)
SODIUM SERPL-SCNC: 142 MMOL/L (ref 136–145)
TROPONIN I SERPL DL<=0.01 NG/ML-MCNC: 0.01 NG/ML (ref 0–0.03)
WBC # BLD AUTO: 5.26 K/UL (ref 3.9–12.7)

## 2022-08-24 PROCEDURE — 77063 BREAST TOMOSYNTHESIS BI: CPT | Mod: 26,HCNC,, | Performed by: RADIOLOGY

## 2022-08-24 PROCEDURE — 77067 SCR MAMMO BI INCL CAD: CPT | Mod: TC,HCNC

## 2022-08-24 PROCEDURE — 25000003 PHARM REV CODE 250: Mod: HCNC | Performed by: EMERGENCY MEDICINE

## 2022-08-24 PROCEDURE — 77063 MAMMO DIGITAL SCREENING BILAT WITH TOMO: ICD-10-PCS | Mod: 26,HCNC,, | Performed by: RADIOLOGY

## 2022-08-24 PROCEDURE — 93010 ELECTROCARDIOGRAM REPORT: CPT | Mod: HCNC,,, | Performed by: INTERNAL MEDICINE

## 2022-08-24 PROCEDURE — 85025 COMPLETE CBC W/AUTO DIFF WBC: CPT | Mod: HCNC | Performed by: EMERGENCY MEDICINE

## 2022-08-24 PROCEDURE — 93010 EKG 12-LEAD: ICD-10-PCS | Mod: HCNC,,, | Performed by: INTERNAL MEDICINE

## 2022-08-24 PROCEDURE — 83735 ASSAY OF MAGNESIUM: CPT | Mod: HCNC | Performed by: EMERGENCY MEDICINE

## 2022-08-24 PROCEDURE — 77080 DEXA BONE DENSITY SPINE HIP: ICD-10-PCS | Mod: 26,HCNC,, | Performed by: STUDENT IN AN ORGANIZED HEALTH CARE EDUCATION/TRAINING PROGRAM

## 2022-08-24 PROCEDURE — 93005 ELECTROCARDIOGRAM TRACING: CPT | Mod: HCNC

## 2022-08-24 PROCEDURE — 80053 COMPREHEN METABOLIC PANEL: CPT | Mod: HCNC | Performed by: EMERGENCY MEDICINE

## 2022-08-24 PROCEDURE — 84484 ASSAY OF TROPONIN QUANT: CPT | Mod: HCNC | Performed by: EMERGENCY MEDICINE

## 2022-08-24 PROCEDURE — 77063 BREAST TOMOSYNTHESIS BI: CPT | Mod: TC,HCNC

## 2022-08-24 PROCEDURE — 77067 MAMMO DIGITAL SCREENING BILAT WITH TOMO: ICD-10-PCS | Mod: 26,HCNC,, | Performed by: RADIOLOGY

## 2022-08-24 PROCEDURE — 77067 SCR MAMMO BI INCL CAD: CPT | Mod: 26,HCNC,, | Performed by: RADIOLOGY

## 2022-08-24 PROCEDURE — 99285 EMERGENCY DEPT VISIT HI MDM: CPT | Mod: 25,HCNC

## 2022-08-24 PROCEDURE — 77080 DXA BONE DENSITY AXIAL: CPT | Mod: TC,HCNC

## 2022-08-24 PROCEDURE — 77080 DXA BONE DENSITY AXIAL: CPT | Mod: 26,HCNC,, | Performed by: STUDENT IN AN ORGANIZED HEALTH CARE EDUCATION/TRAINING PROGRAM

## 2022-08-24 RX ORDER — ONDANSETRON 4 MG/1
4 TABLET, ORALLY DISINTEGRATING ORAL EVERY 6 HOURS PRN
COMMUNITY
Start: 2022-03-28 | End: 2022-08-24

## 2022-08-24 RX ORDER — SODIUM PICOSULFATE, MAGNESIUM OXIDE, AND ANHYDROUS CITRIC ACID 10; 3.5; 12 MG/160ML; G/160ML; G/160ML
LIQUID ORAL
COMMUNITY
Start: 2022-02-14 | End: 2023-07-18

## 2022-08-24 RX ORDER — LIDOCAINE HYDROCHLORIDE 20 MG/ML
15 SOLUTION OROPHARYNGEAL ONCE
Status: COMPLETED | OUTPATIENT
Start: 2022-08-24 | End: 2022-08-24

## 2022-08-24 RX ORDER — CALCIUM CARBONATE 600 MG
1 TABLET ORAL DAILY
COMMUNITY

## 2022-08-24 RX ORDER — MAG HYDROX/ALUMINUM HYD/SIMETH 200-200-20
30 SUSPENSION, ORAL (FINAL DOSE FORM) ORAL ONCE
Status: COMPLETED | OUTPATIENT
Start: 2022-08-24 | End: 2022-08-24

## 2022-08-24 RX ADMIN — LIDOCAINE HYDROCHLORIDE 15 ML: 20 SOLUTION ORAL; TOPICAL at 10:08

## 2022-08-24 RX ADMIN — ALUMINUM HYDROXIDE, MAGNESIUM HYDROXIDE, AND SIMETHICONE 30 ML: 200; 200; 20 SUSPENSION ORAL at 10:08

## 2022-08-24 NOTE — PHARMACY MED REC
"  [facility]           Pharmacy  Admission Medication History     The home medication history was taken by Susanne Kam.      Medication history obtained from Medications listed below were obtained from: Patient/family    Based on information gathered for medication list, you may go to "Admission" then "Reconcile Home Medications" tabs to review and/or act upon those items.      The home medication list has been updated by the Pharmacy department.    Please read ALL comments highlighted in yellow.    Please address this information as you see fit.     Feel free to contact us if you have any questions or require assistance.    The medications listed below were removed from the home medication list.  Please reorder if appropriate:     Patient reports NOT TAKING the following medication(s):  o zofran-odt 4mg      No current facility-administered medications on file prior to encounter.     Current Outpatient Medications on File Prior to Encounter   Medication Sig Dispense Refill    aspirin (ECOTRIN) 81 MG EC tablet Take 81 mg by mouth once daily.      b complex vitamins capsule Take 1 capsule by mouth once daily.      biotin 1 mg tablet Take 1,000 mcg by mouth once daily.      calcium carbonate (OS-ANDREW) 600 mg calcium (1,500 mg) Tab Take 1 tablet by mouth once daily.      famotidine (PEPCID) 20 MG tablet Take 20 mg by mouth daily as needed for Heartburn.      fish oil-omega-3 fatty acids 300-1,000 mg capsule Take 2 g by mouth once daily.      fluticasone (FLONASE) 50 mcg/actuation nasal spray 2 sprays by Each Nare route once daily. 3 Bottle 3    GINGER ROOT, BULK, MISC by Misc.(Non-Drug; Combo Route) route.      multivitamin capsule Take 1 capsule by mouth once daily.      pravastatin (PRAVACHOL) 40 MG tablet Take 1 tablet (40 mg total) by mouth once daily. (Patient taking differently: Take 40 mg by mouth every evening.) 90 tablet 3    valACYclovir (VALTREX) 1000 MG tablet Take 1 " tablet (1,000 mg total) by mouth once daily. 1/2 tablet daily (Patient taking differently: Take 500 mg by mouth once daily. 1/2 tablet daily) 45 tablet 3    vitamin D 1000 units Tab Take 1,000 Units by mouth once daily.      sod picosulf-mag ox-citric ac (CLENPIQ) 10 mg-3.5 gram -12 gram/160 mL Soln once      [DISCONTINUED] CALCIUM CARBONATE (CALCIUM 600 ORAL) Take by mouth.      [DISCONTINUED] ondansetron (ZOFRAN-ODT) 4 MG TbDL Take 4 mg by mouth every 6 (six) hours as needed.         Please address this information as you see fit.  Feel free to contact us if you have any questions or require assistance.    Susanne Kam  [phone]                .

## 2022-08-24 NOTE — ED PROVIDER NOTES
Encounter Date: 8/24/2022    SCRIBE #1 NOTE: I, Davin Shaw Jr, am scribing for, and in the presence of,  Jose Ortez MD. I have scribed the following portions of the note - Other sections scribed: KO, KAREN.       History     Chief Complaint   Patient presents with    Chest Pain     Cp left anterior chest interminent burning and pressure. No improvement with acid refulx medication. Pain has been ongoing for 7 days no n/v/d     Rachael June is a 67 y.o. female who has a past medical history of abnormal pap smear of cervix, colon polyp, dysplasia of vagina, genital herpes, gastroesophageal reflux disease, hyperlipidemia, insulinoma, and special screening for malignant neoplasms, colon. The patient presents to the ED due to chest pain; onset seven days. Associated symptoms include back pain and shoulder pain. The patient reports that she is currently experiencing left-sided intermittent burning in her chest that started at 6:30 a.m. The pain is primarily focused in her chest, but occasionally radiates to her lower shoulders and back. She takes antacid with no relief. The patient denies leg swelling. She is allergic to adhesive tape-silicones and penicillin.       The history is provided by the patient. No  was used.     Review of patient's allergies indicates:   Allergen Reactions    Adhesive tape-silicones     Pcn [penicillins]      Past Medical History:   Diagnosis Date    Abnormal Pap smear of cervix     Colon polyp     Condition not found     h/o abnl vaginal cuff biopsy-mild dysplasia    Dysplasia of vagina     vaginal cuff biopsy    Genital herpes, unspecified     GERD (gastroesophageal reflux disease)     Hyperlipidemia     Insulinoma     s/p resection    Special screening for malignant neoplasms, colon 7/6/2015     Past Surgical History:   Procedure Laterality Date    COLONOSCOPY N/A 6/8/2017    Procedure: COLONOSCOPY;  Surgeon: Chadd Davis MD;  Location:  [facility] (4TH [facility]);  Service: Endoscopy;  Laterality: N/A;    COLONOSCOPY W/ POLYPECTOMY      HYSTERECTOMY      fibroids    insulinoma resection      SHOULDER ARTHROSCOPY Left 9/23/2015    Dr Rm      Family History   Problem Relation Age of Onset    Heart disease Mother     Hypertension Mother     Diabetes Mother     Hyperlipidemia Mother     Stroke Father     Diabetes Father     Heart disease Father     Cancer Sister         breast cancer    Diabetes Sister     Breast cancer Sister     Cancer Maternal Aunt         ovarian cancer    Diabetes Brother     Diabetes Sister     Heart disease Brother      Social History     Tobacco Use    Smoking status: Never Smoker    Smokeless tobacco: Never Used   Substance Use Topics    Alcohol use: No    Drug use: No     Review of Systems   Constitutional: Negative for chills and fever.        Positive heartburn.   HENT: Negative for sore throat.    Respiratory: Negative for cough and shortness of breath.    Cardiovascular: Negative for chest pain and leg swelling.   Gastrointestinal: Negative for nausea and vomiting.   Genitourinary: Negative for dysuria, frequency and urgency.   Musculoskeletal: Positive for back pain. Negative for neck pain and neck stiffness.        Positive shoulder pain.   Skin: Negative for rash and wound.   Neurological: Negative for syncope and weakness.   Hematological: Does not bruise/bleed easily.   Psychiatric/Behavioral: Negative for agitation, behavioral problems and confusion.       Physical Exam     Initial Vitals [08/24/22 0848]   BP Pulse Resp Temp SpO2   (!) 168/81 72 20 98 °F (36.7 °C) 98 %      MAP       --         Physical Exam    Nursing note and vitals reviewed.  HENT:   Head: Atraumatic.   Eyes: Conjunctivae and EOM are normal.   Neck:   Normal range of motion.  Cardiovascular: Exam reveals no gallop and no friction rub.    No murmur heard.  Pulmonary/Chest: Breath sounds normal. No respiratory distress.  "  Abdominal: Abdomen is soft. There is no abdominal tenderness.   Musculoskeletal:         General: No edema. Normal range of motion.      Cervical back: Normal range of motion.     Neurological: She is alert and oriented to person, place, and time.   Psychiatric: She has a normal mood and affect.         ED Course   Procedures  Labs Reviewed   CBC W/ AUTO DIFFERENTIAL - Abnormal; Notable for the following components:       Result Value    Lymph % 50.0 (*)     All other components within normal limits   COMPREHENSIVE METABOLIC PANEL   TROPONIN I   MAGNESIUM        ECG Results          EKG 12-lead (In process)  Result time 08/24/22 09:42:35    In process by Interface, Lab In [facility] (08/24/22 09:42:35)                 Narrative:    Test Reason : R07.9,    Vent. Rate : 070 BPM     Atrial Rate : 070 BPM     P-R Int : 164 ms          QRS Dur : 084 ms      QT Int : 372 ms       P-R-T Axes : 074 023 069 degrees     QTc Int : 401 ms    Normal sinus rhythm  Normal ECG  When compared with ECG of 21-JUN-2017 13:13,  Previous ECG has undetermined rhythm, needs review    Referred By: [email or web address]   SELF           Confirmed By:                             Imaging Results          X-Ray Chest AP Portable (Final result)  Result time 08/24/22 09:12:59    Final result by Mike Rahman MD (08/24/22 09:12:59)                 Impression:      No acute cardiopulmonary finding identified on this single view.      Electronically signed by: Mike Rahman MD  Date:    08/24/2022  Time:    09:12             Narrative:    EXAMINATION:  XR CHEST AP PORTABLE    CLINICAL HISTORY:  Provided history is "chest pain;  ".    TECHNIQUE:  One view of the chest.    COMPARISON:  05/15/2017.    FINDINGS:  Cardiac wires overlie the chest.  Cardiac silhouette is not enlarged.  Atherosclerotic calcifications overlie the aortic arch.  There are coarsened interstitial lung markings but no focal consolidation.  No sizable pleural effusion.  No " pneumothorax.                                 Medications   aluminum-magnesium hydroxide-simethicone 200-200-20 mg/5 mL suspension 30 mL (30 mLs Oral Given 8/24/22 1035)     And   LIDOcaine HCl 2% oral solution 15 mL (15 mLs Oral Given 8/24/22 1035)     Medical Decision Making:   Initial Assessment:   67-year-old female presents with 1 week of intermittent left-sided chest pain.  Patient says pain symptoms radiates to her back or shoulders.  Currently pain is stable in her left chest.  Vital signs unremarkable.  She appears to be in no distress.  Pulses equal in all extremities.  Lungs are clear.  EKG reviewed interpreted myself shows no evidence of acute ischemia.  Normal sinus rhythm with a rate of 70. No concerning ST changes.  Chest x-ray shows no acute process.  Patient's labs including troponin are unremarkable.  Discussed results with the patient.  Offered admission for further cardiac evaluation and stress test however patient declined and says she would like to follow up with her primary care doctor and have this done as an outpatient.          Scribe Attestation:   Scribe #1: I performed the above scribed service and the documentation accurately describes the services I performed. I attest to the accuracy of the note.                 Portions of this chart were completed by the scribe by interpretive transcription of statements made by the patient as a result of my questions at the bedside. Other portions were completed by the scribe from statements made by me for direct transcription into the medical record. Following completion of the charting by the scribe, I made modifications for both correctness and proper phrasing.  Clinical Impression:   Final diagnoses:  [R07.9] Chest pain  [R07.9] Chest pain, unspecified type (Primary)          ED Disposition Condition    Discharge Stable        ED Prescriptions     None        Follow-up Information     Follow up With Specialties Details Why Contact Info    Fro[place]  Shai Eckert MD Family Medicine Schedule an appointment as soon as possible for a visit   [address] [facility] [address]  [address]  [facility] [address]  [phone]             Jose Ortez MD  08/24/22 1040

## 2022-08-25 NOTE — TELEPHONE ENCOUNTER
See message, ER visit for chest pain, workup was normal at that time.  Would advise follow-up visit to discuss

## 2022-08-26 ENCOUNTER — TELEPHONE (OUTPATIENT)
Dept: FAMILY MEDICINE | Facility: CLINIC | Age: 67
End: 2022-08-26
Payer: COMMERCIAL

## 2022-08-26 NOTE — TELEPHONE ENCOUNTER
----- Message from Jprosa elena Ga sent at 8/26/2022 12:25 PM CDT -----  Contact: pt  Type: Requesting to speak with nurse        Who Called: PT  Regarding: would like a call back regarding how soon the doctor would like to see her   Would the patient rather a call back or a response via [employer]? Call back  Best Call Back Number:[phone]  Additional Information:

## 2022-08-26 NOTE — TELEPHONE ENCOUNTER
Spoke with patient who informed me she went to the ER on wednesday for chest pain . She was given a GI cocktail which helped her . Patient states she was  Recommended a stress test to check if she had any blockage .

## 2022-08-29 ENCOUNTER — OFFICE VISIT (OUTPATIENT)
Dept: FAMILY MEDICINE | Facility: CLINIC | Age: 67
End: 2022-08-29
Payer: COMMERCIAL

## 2022-08-29 VITALS
WEIGHT: 161.63 LBS | HEIGHT: 60 IN | DIASTOLIC BLOOD PRESSURE: 86 MMHG | BODY MASS INDEX: 31.73 KG/M2 | SYSTOLIC BLOOD PRESSURE: 142 MMHG | HEART RATE: 79 BPM | OXYGEN SATURATION: 98 %

## 2022-08-29 DIAGNOSIS — R07.9 LEFT-SIDED CHEST PAIN: Primary | ICD-10-CM

## 2022-08-29 DIAGNOSIS — K21.9 GASTROESOPHAGEAL REFLUX DISEASE, UNSPECIFIED WHETHER ESOPHAGITIS PRESENT: ICD-10-CM

## 2022-08-29 DIAGNOSIS — R03.0 ELEVATED BLOOD PRESSURE READING: ICD-10-CM

## 2022-08-29 PROCEDURE — 99999 PR PBB SHADOW E&M-EST. PATIENT-LVL V: CPT | Mod: PBBFAC,HCNC,, | Performed by: FAMILY MEDICINE

## 2022-08-29 PROCEDURE — 3008F PR BODY MASS INDEX (BMI) DOCUMENTED: ICD-10-PCS | Mod: HCNC,CPTII,S$GLB, | Performed by: FAMILY MEDICINE

## 2022-08-29 PROCEDURE — 3288F PR FALLS RISK ASSESSMENT DOCUMENTED: ICD-10-PCS | Mod: HCNC,CPTII,S$GLB, | Performed by: FAMILY MEDICINE

## 2022-08-29 PROCEDURE — 3077F PR MOST RECENT SYSTOLIC BLOOD PRESSURE >= 140 MM HG: ICD-10-PCS | Mod: HCNC,CPTII,S$GLB, | Performed by: FAMILY MEDICINE

## 2022-08-29 PROCEDURE — 99215 OFFICE O/P EST HI 40 MIN: CPT | Mod: HCNC,S$GLB,, | Performed by: FAMILY MEDICINE

## 2022-08-29 PROCEDURE — 3044F HG A1C LEVEL LT 7.0%: CPT | Mod: HCNC,CPTII,S$GLB, | Performed by: FAMILY MEDICINE

## 2022-08-29 PROCEDURE — 99215 PR OFFICE/OUTPT VISIT, EST, LEVL V, 40-54 MIN: ICD-10-PCS | Mod: HCNC,S$GLB,, | Performed by: FAMILY MEDICINE

## 2022-08-29 PROCEDURE — 3044F PR MOST RECENT HEMOGLOBIN A1C LEVEL <7.0%: ICD-10-PCS | Mod: HCNC,CPTII,S$GLB, | Performed by: FAMILY MEDICINE

## 2022-08-29 PROCEDURE — 3008F BODY MASS INDEX DOCD: CPT | Mod: HCNC,CPTII,S$GLB, | Performed by: FAMILY MEDICINE

## 2022-08-29 PROCEDURE — 3077F SYST BP >= 140 MM HG: CPT | Mod: HCNC,CPTII,S$GLB, | Performed by: FAMILY MEDICINE

## 2022-08-29 PROCEDURE — 1101F PT FALLS ASSESS-DOCD LE1/YR: CPT | Mod: HCNC,CPTII,S$GLB, | Performed by: FAMILY MEDICINE

## 2022-08-29 PROCEDURE — 1159F PR MEDICATION LIST DOCUMENTED IN MEDICAL RECORD: ICD-10-PCS | Mod: HCNC,CPTII,S$GLB, | Performed by: FAMILY MEDICINE

## 2022-08-29 PROCEDURE — 3288F FALL RISK ASSESSMENT DOCD: CPT | Mod: HCNC,CPTII,S$GLB, | Performed by: FAMILY MEDICINE

## 2022-08-29 PROCEDURE — 3079F DIAST BP 80-89 MM HG: CPT | Mod: HCNC,CPTII,S$GLB, | Performed by: FAMILY MEDICINE

## 2022-08-29 PROCEDURE — 3079F PR MOST RECENT DIASTOLIC BLOOD PRESSURE 80-89 MM HG: ICD-10-PCS | Mod: HCNC,CPTII,S$GLB, | Performed by: FAMILY MEDICINE

## 2022-08-29 PROCEDURE — 1101F PR PT FALLS ASSESS DOC 0-1 FALLS W/OUT INJ PAST YR: ICD-10-PCS | Mod: HCNC,CPTII,S$GLB, | Performed by: FAMILY MEDICINE

## 2022-08-29 PROCEDURE — 99999 PR PBB SHADOW E&M-EST. PATIENT-LVL V: ICD-10-PCS | Mod: PBBFAC,HCNC,, | Performed by: FAMILY MEDICINE

## 2022-08-29 PROCEDURE — 1159F MED LIST DOCD IN RCRD: CPT | Mod: HCNC,CPTII,S$GLB, | Performed by: FAMILY MEDICINE

## 2022-08-29 RX ORDER — SUCRALFATE 1 G/10ML
1 SUSPENSION ORAL
Qty: 414 ML | Refills: 0 | Status: SHIPPED | OUTPATIENT
Start: 2022-08-29 | End: 2022-09-01

## 2022-08-29 RX ORDER — PANTOPRAZOLE SODIUM 40 MG/1
40 TABLET, DELAYED RELEASE ORAL DAILY
Qty: 30 TABLET | Refills: 3 | Status: SHIPPED | OUTPATIENT
Start: 2022-08-29 | End: 2022-11-07

## 2022-08-29 NOTE — PATIENT INSTRUCTIONS
Costochondritis exercises:        Wall slide. Stand with your back against the wall. Raise your arms up so that your fingertips point to the ceiling and your elbows are raised about shoulder height. Keeping your arms against the wall, raise them up overhead. Hold and then lower to the start position. Repeat 10 to 12 times per session.  Scapula squeeze. Stand straight and tall, reaching your head toward the ceiling and breathing comfortably. Squeeze your shoulder blades together and hold the squeeze as long as it feels comfortable. Release. Repeat five to 10 times per session.  Stretch the pecs. Stand just outside a doorway and reach your arms up as high as you can on either side of the frame. Supporting your weight with your hands, push your chest and body straight forward. Keep your abs tight so that your back doesn't wobble or arch. Hold for a few seconds and return to the starting position. Repeat five to 10 times per session.  Thorax stretch. Sit down with your legs straight out in front of you, keeping your back nice and straight. With your hands on the floor at the side of your thighs, lean forward slowly. Curve down over your legs so that your bellybutton moves toward your spine. Hold for a few seconds and slowly sit back up. Repeat five to 10 times per session.  Side stretch. Starting in the same seated position as you did for the thorax stretch, except place your hands on your lower thighs. Bend your right arm and lean forward. Try to get your right elbow down to the knee on that side, allowing your hand to slide across to your left leg. For extra stretch, hold your elbow on the inside of the right knee, using the pressure between them to turn your chest further toward the left. Slowly release and return to the start position. Repeat with the left side of the body. Alternate sides, stretching each side 10 to 12 times per session.

## 2022-08-29 NOTE — PROGRESS NOTES
(Portions of this note were dictated using voice recognition software and may contain dictation related errors in spelling/grammar/syntax not found on text review)    CC:   Chief Complaint   Patient presents with    Follow-up       HPI: 67 y.o. female Establish care visit 07/14/2022     Medical history significant for HLD, prediabetes, GERD, allergic rhinitis, intermittent constipation, KEMAL    Went to ER on 08/24/2022 for chest pain left anterior intermittent burning and pressure with no improvement with acid reflux medication (famotidine and mylanta), going on for 7 days with no nausea vomiting or diarrhea. Gi cocktail in ED temp relieved but felt not persistently so.  Per history was getting occasional radiation to lower shoulders and back.  BP was elevated in /81.  Afebrile.    EKG personally reviewed from ED, showing normal sinus rhythm with normal axis and normal intervals.  No acute ST or T-wave changes was normal sinus rhythm no acute ischemic change.  Chest x-ray was clear of focal consolidation.  Noted coarse interstitial lung markings, no pleural effusion, aortic classification noted over aortic arch, cardiac silhouette not enlarged.  Troponin negative.  Offered admission for cardiac workup although wished to do this as an outpatient.   She is currently maintained on aspirin 81 mg daily and pravastatin 40 mg daily for hyperlipidemia.     No dyspnea, feels fatigued. Does get some tingling left hand  +stress with dtr and financial issues, she'll watch 3 yo gdtr but also balancing work. Doesn't feel like this sx is stress induced.   No pain in stomach, no n/v.  Does not really notice food induced symptoms.  Chest pain is mainly at rest, not necessarily preferably with lying down or other position changes.  Today was feeling like it was recurring through the day, sometimes described as sharp and needle like left side chest, sometimes to the back.  Has been starting exercise but mainly walking, no  significant upper body exercise.  No recent other illnesses.  BP is elevated, feels like it is mainly when she comes that doctor's office.  Has been checking blood pressures at home with her brachial monitor and has been fairly well controlled in the 120 systolic range.  Does have a significant CAD history in the family   Had stress echo in 2017, negative for ischemic changes     Past Medical History:   Diagnosis Date    Abnormal Pap smear of cervix     Colon polyp     Condition not found     h/o abnl vaginal cuff biopsy-mild dysplasia    Dysplasia of vagina     vaginal cuff biopsy    Genital herpes, unspecified     GERD (gastroesophageal reflux disease)     Hyperlipidemia     Insulinoma     s/p resection    Special screening for malignant neoplasms, colon 7/6/2015       Past Surgical History:   Procedure Laterality Date    COLONOSCOPY N/A 6/8/2017    Procedure: COLONOSCOPY;  Surgeon: Chdad Davis MD;  Location: [facility] ([address] [place] [address]);  Service: Endoscopy;  Laterality: N/A;    COLONOSCOPY W/ POLYPECTOMY      HYSTERECTOMY      fibroids    insulinoma resection      SHOULDER ARTHROSCOPY Left 9/23/2015    Dr Rm        Family History   Problem Relation Age of Onset    Heart disease Mother     Hypertension Mother     Diabetes Mother     Hyperlipidemia Mother     Stroke Father     Diabetes Father     Heart disease Father     Cancer Sister         breast cancer    Diabetes Sister     Breast cancer Sister     Cancer Maternal Aunt         ovarian cancer    Diabetes Brother     Diabetes Sister     Heart disease Brother        Social History     Tobacco Use    Smoking status: Never    Smokeless tobacco: Never   Substance Use Topics    Alcohol use: No    Drug use: No       Lab Results   Component Value Date    WBC 5.26 08/24/2022    HGB 13.3 08/24/2022    HCT 40.1 08/24/2022    MCV 91 08/24/2022     08/24/2022    CHOL 178 07/15/2022    TRIG 54 07/15/2022    HDL 69 07/15/2022    ALT 18 08/24/2022    AST 18  08/24/2022    BILITOT 0.7 08/24/2022    ALKPHOS 109 08/24/2022     08/24/2022    K 3.9 08/24/2022     08/24/2022    CREATININE 0.7 08/24/2022    ESTGFRAFRICA >60.0 07/15/2022    EGFRNONAA >60.0 07/15/2022    EGFRNORACEVR >60 08/24/2022    CALCIUM 9.7 08/24/2022    ALBUMIN 4.1 08/24/2022    BUN 9 08/24/2022    CO2 25 08/24/2022    TSH 1.264 07/15/2022    INR 1.0 01/15/2015    HGBA1C 5.9 (H) 07/15/2022    MICALBCREAT 10.0 10/29/2008    LDLCALC 98.2 07/15/2022    GLU 94 08/24/2022    [id number] 35 06/06/2017                 Vital signs reviewed  Vitals:    08/29/22 1641   BP: (!) 142/86   Pulse: 79   SpO2: 98%   Weight: 73.3 kg (161 lb 9.6 oz)   Height: 5' (1.524 m)       Wt Readings from Last 4 Encounters:   07/14/22 75.7 kg (166 lb 14.2 oz)   07/13/17 70.8 kg (156 lb)   06/08/17 70.8 kg (156 lb)   06/06/17 69.4 kg (153 lb)       PE:   APPEARANCE: Well nourished, well developed, in no acute distress.    HEAD: Normocephalic, atraumatic.  EYES:   Conjunctivae noninjected.  NECK: Supple with no cervical lymphadenopathy.  No carotid bruits.  No thyromegaly.  CHEST: Good inspiratory effort. Lungs clear to auscultation with no wheezes or crackles.  Does have some costochondral junction tenderness preferentially on the left verses right.  CARDIOVASCULAR: Normal S1, S2. No rubs, murmurs, or gallops.  ABDOMEN: Bowel sounds normal. Not distended. Soft. No tenderness or masses. No organomegaly.  EXTREMITIES: No edema       IMPRESSION  1. Left-sided chest pain    2. Elevated blood pressure reading    3. Gastroesophageal reflux disease, unspecified whether esophagitis present            PLAN  Reviewed ER record.  Reviewed her workup as noted above.  New problem which does need further workup.  Discussed differential diagnosis including cardiogenic pain, GI source pain with GERD, possibility of costochondritis potentially contributing as well.    Will update stress testing especially given strong family history,  elevated blood pressure, hyperlipidemia history.      Will empirically treat for any resistant GERD related pain with pantoprazole/sucralfate for the next 1-2 weeks.  She can take this with her famotidine.  Attention to dietary precautions for GERD     She was given some chest wall S exercises for costochondritis.  Will hold off on NSAIDs given above differential for the time being     Elevated blood pressure, she feels like it is mainly office related.  Home blood pressure readings usually better.  She is advised 2 week home readings and sent through portal, will set up for nurse's visit in the next 2-3 weeks to bring her blood pressure cuff and have it checked against our office equipment.    Orders Placed This Encounter   Procedures    NM Myocardial Perfusion Spect Multi Exer    Nuclear Stress Test

## 2022-09-01 ENCOUNTER — TELEPHONE (OUTPATIENT)
Dept: FAMILY MEDICINE | Facility: CLINIC | Age: 67
End: 2022-09-01
Payer: COMMERCIAL

## 2022-09-01 RX ORDER — SUCRALFATE 1 G/1
1 TABLET ORAL
Qty: 60 TABLET | Refills: 0 | Status: SHIPPED | OUTPATIENT
Start: 2022-09-01 | End: 2023-07-18

## 2022-09-01 NOTE — TELEPHONE ENCOUNTER
Returned patient's call. She stated that you had discussed giving her a reflux medication in pill form if the liquid was too expensive. She would like the pill prescription sent to [facility].

## 2022-09-01 NOTE — TELEPHONE ENCOUNTER
----- Message from Robbi Corrigan sent at 8/30/2022 10:45 AM CDT -----  Contact: [phone]  Who Called: PT  Regarding: medication was too expensive for his acid reflux ( liquid)   Would the patient rather a call back or a response via [facility]? Call back  Best Call Back Number: [phone]   Additional Information: n/a

## 2022-09-04 ENCOUNTER — PATIENT MESSAGE (OUTPATIENT)
Dept: FAMILY MEDICINE | Facility: CLINIC | Age: 67
End: 2022-09-04
Payer: COMMERCIAL

## 2022-09-12 ENCOUNTER — TELEPHONE (OUTPATIENT)
Dept: FAMILY MEDICINE | Facility: CLINIC | Age: 67
End: 2022-09-12

## 2022-09-12 ENCOUNTER — PATIENT MESSAGE (OUTPATIENT)
Dept: FAMILY MEDICINE | Facility: CLINIC | Age: 67
End: 2022-09-12

## 2022-09-12 ENCOUNTER — CLINICAL SUPPORT (OUTPATIENT)
Dept: FAMILY MEDICINE | Facility: CLINIC | Age: 67
End: 2022-09-12
Payer: COMMERCIAL

## 2022-09-12 VITALS — DIASTOLIC BLOOD PRESSURE: 62 MMHG | SYSTOLIC BLOOD PRESSURE: 122 MMHG

## 2022-09-12 DIAGNOSIS — Z01.30 BP CHECK: Primary | ICD-10-CM

## 2022-09-12 PROCEDURE — 99999 PR PBB SHADOW E&M-EST. PATIENT-LVL II: ICD-10-PCS | Mod: PBBFAC,HCNC,,

## 2022-09-12 PROCEDURE — 99999 PR PBB SHADOW E&M-EST. PATIENT-LVL II: CPT | Mod: PBBFAC,HCNC,,

## 2022-09-12 NOTE — PROGRESS NOTES
Patient came for a bp check. Patient brought a new bp machine electronic. Manual bp was 142/70 but electronic 179/74 in left arm for both. 161/72 electronic right arm. Manual reading in right arm was 122/62. Notified patient  will be aware of readings.

## 2022-09-12 NOTE — TELEPHONE ENCOUNTER
Patient came for a bp check. Patient brought a new bp machine electronic. Manual bp was 142/70 but electronic 179/74 in left arm for both. 161/72 electronic right arm. Manual reading in right arm was 122/62. Notified patient  will be aware of readings.  Also have patient readings from home with electronic machine ( bp logs).

## 2022-09-13 ENCOUNTER — HOSPITAL ENCOUNTER (OUTPATIENT)
Dept: CARDIOLOGY | Facility: HOSPITAL | Age: 67
Discharge: HOME | End: 2022-09-13
Attending: FAMILY MEDICINE
Payer: COMMERCIAL

## 2022-09-13 ENCOUNTER — HOSPITAL ENCOUNTER (OUTPATIENT)
Dept: RADIOLOGY | Facility: HOSPITAL | Age: 67
Discharge: HOME | End: 2022-09-13
Attending: FAMILY MEDICINE
Payer: COMMERCIAL

## 2022-09-13 DIAGNOSIS — R07.9 LEFT-SIDED CHEST PAIN: ICD-10-CM

## 2022-09-13 LAB
CV STRESS BASE HR: 71 BPM
DIASTOLIC BLOOD PRESSURE: 88 MMHG
OHS CV CPX 1 MINUTE RECOVERY HEART RATE: 133 BPM
OHS CV CPX 85 PERCENT MAX PREDICTED HEART RATE MALE: 126
OHS CV CPX ESTIMATED METS: 10
OHS CV CPX MAX PREDICTED HEART RATE: 149
OHS CV CPX PATIENT IS FEMALE: 1
OHS CV CPX PATIENT IS MALE: 0
OHS CV CPX PEAK DIASTOLIC BLOOD PRESSURE: 93 MMHG
OHS CV CPX PEAK HEAR RATE: 153 BPM
OHS CV CPX PEAK RATE PRESSURE PRODUCT: NORMAL
OHS CV CPX PEAK SYSTOLIC BLOOD PRESSURE: 234 MMHG
OHS CV CPX PERCENT MAX PREDICTED HEART RATE ACHIEVED: 103
OHS CV CPX RATE PRESSURE PRODUCT PRESENTING: NORMAL
STRESS ECHO POST EXERCISE DUR MIN: 8 MINUTES
STRESS ECHO POST EXERCISE DUR SEC: 1 SECONDS
STRESS ST DEPRESSION: 0.5 MM
SYSTOLIC BLOOD PRESSURE: 154 MMHG

## 2022-09-13 PROCEDURE — 93016 CV STRESS TEST SUPVJ ONLY: CPT | Mod: HCNC,,, | Performed by: INTERNAL MEDICINE

## 2022-09-13 PROCEDURE — 93016 NUCLEAR STRESS TEST (CUPID ONLY): ICD-10-PCS | Mod: HCNC,,, | Performed by: INTERNAL MEDICINE

## 2022-09-13 PROCEDURE — 78452 HT MUSCLE IMAGE SPECT MULT: CPT | Mod: 26,HCNC,, | Performed by: RADIOLOGY

## 2022-09-13 PROCEDURE — 78452 NM MYOCARDIAL PERFUSION SPECT MULTI STUDY: ICD-10-PCS | Mod: 26,HCNC,, | Performed by: RADIOLOGY

## 2022-09-13 PROCEDURE — 93017 CV STRESS TEST TRACING ONLY: CPT | Mod: HCNC

## 2022-09-13 PROCEDURE — 93018 NUCLEAR STRESS TEST (CUPID ONLY): ICD-10-PCS | Mod: HCNC,,, | Performed by: INTERNAL MEDICINE

## 2022-09-13 PROCEDURE — A9502 TC99M TETROFOSMIN: HCPCS | Mod: HCNC

## 2022-09-13 PROCEDURE — 93018 CV STRESS TEST I&R ONLY: CPT | Mod: HCNC,,, | Performed by: INTERNAL MEDICINE

## 2022-09-13 RX ORDER — REGADENOSON 0.08 MG/ML
0.4 INJECTION, SOLUTION INTRAVENOUS ONCE
Status: DISCONTINUED | OUTPATIENT
Start: 2022-09-13 | End: 2022-09-21 | Stop reason: HOSPADM

## 2022-09-16 ENCOUNTER — TELEPHONE (OUTPATIENT)
Dept: FAMILY MEDICINE | Facility: CLINIC | Age: 67
End: 2022-09-16
Payer: COMMERCIAL

## 2022-09-16 NOTE — TELEPHONE ENCOUNTER
----- Message from Rachel Strauss sent at 9/16/2022 11:56 AM CDT -----  Regarding: call back  Contact: [phone]  Who Called: PT     Patient is calling to talk to nurse in regards to see if her appointment back on July 14, 2020 was processed thru the insurance since they keep calling patient that she is due for her annual.

## 2022-10-14 ENCOUNTER — OFFICE VISIT (OUTPATIENT)
Dept: OBSTETRICS AND GYNECOLOGY | Facility: CLINIC | Age: 67
End: 2022-10-14
Payer: COMMERCIAL

## 2022-10-14 VITALS
WEIGHT: 164.69 LBS | SYSTOLIC BLOOD PRESSURE: 159 MMHG | BODY MASS INDEX: 31.09 KG/M2 | HEIGHT: 61 IN | DIASTOLIC BLOOD PRESSURE: 76 MMHG

## 2022-10-14 DIAGNOSIS — R68.82 DECREASED LIBIDO: Primary | ICD-10-CM

## 2022-10-14 PROCEDURE — 1101F PT FALLS ASSESS-DOCD LE1/YR: CPT | Mod: HCNC,CPTII,S$GLB, | Performed by: OBSTETRICS & GYNECOLOGY

## 2022-10-14 PROCEDURE — 3077F PR MOST RECENT SYSTOLIC BLOOD PRESSURE >= 140 MM HG: ICD-10-PCS | Mod: HCNC,CPTII,S$GLB, | Performed by: OBSTETRICS & GYNECOLOGY

## 2022-10-14 PROCEDURE — 3288F FALL RISK ASSESSMENT DOCD: CPT | Mod: HCNC,CPTII,S$GLB, | Performed by: OBSTETRICS & GYNECOLOGY

## 2022-10-14 PROCEDURE — 3078F PR MOST RECENT DIASTOLIC BLOOD PRESSURE < 80 MM HG: ICD-10-PCS | Mod: HCNC,CPTII,S$GLB, | Performed by: OBSTETRICS & GYNECOLOGY

## 2022-10-14 PROCEDURE — 3044F PR MOST RECENT HEMOGLOBIN A1C LEVEL <7.0%: ICD-10-PCS | Mod: HCNC,CPTII,S$GLB, | Performed by: OBSTETRICS & GYNECOLOGY

## 2022-10-14 PROCEDURE — 3078F DIAST BP <80 MM HG: CPT | Mod: HCNC,CPTII,S$GLB, | Performed by: OBSTETRICS & GYNECOLOGY

## 2022-10-14 PROCEDURE — 3077F SYST BP >= 140 MM HG: CPT | Mod: HCNC,CPTII,S$GLB, | Performed by: OBSTETRICS & GYNECOLOGY

## 2022-10-14 PROCEDURE — 99202 PR OFFICE/OUTPT VISIT, NEW, LEVL II, 15-29 MIN: ICD-10-PCS | Mod: HCNC,S$GLB,, | Performed by: OBSTETRICS & GYNECOLOGY

## 2022-10-14 PROCEDURE — 99999 PR PBB SHADOW E&M-EST. PATIENT-LVL III: ICD-10-PCS | Mod: PBBFAC,HCNC,, | Performed by: OBSTETRICS & GYNECOLOGY

## 2022-10-14 PROCEDURE — 1159F PR MEDICATION LIST DOCUMENTED IN MEDICAL RECORD: ICD-10-PCS | Mod: HCNC,CPTII,S$GLB, | Performed by: OBSTETRICS & GYNECOLOGY

## 2022-10-14 PROCEDURE — 3044F HG A1C LEVEL LT 7.0%: CPT | Mod: HCNC,CPTII,S$GLB, | Performed by: OBSTETRICS & GYNECOLOGY

## 2022-10-14 PROCEDURE — 3288F PR FALLS RISK ASSESSMENT DOCUMENTED: ICD-10-PCS | Mod: HCNC,CPTII,S$GLB, | Performed by: OBSTETRICS & GYNECOLOGY

## 2022-10-14 PROCEDURE — 1101F PR PT FALLS ASSESS DOC 0-1 FALLS W/OUT INJ PAST YR: ICD-10-PCS | Mod: HCNC,CPTII,S$GLB, | Performed by: OBSTETRICS & GYNECOLOGY

## 2022-10-14 PROCEDURE — 99202 OFFICE O/P NEW SF 15 MIN: CPT | Mod: HCNC,S$GLB,, | Performed by: OBSTETRICS & GYNECOLOGY

## 2022-10-14 PROCEDURE — 99999 PR PBB SHADOW E&M-EST. PATIENT-LVL III: CPT | Mod: PBBFAC,HCNC,, | Performed by: OBSTETRICS & GYNECOLOGY

## 2022-10-14 PROCEDURE — 1159F MED LIST DOCD IN RCRD: CPT | Mod: HCNC,CPTII,S$GLB, | Performed by: OBSTETRICS & GYNECOLOGY

## 2022-10-14 RX ORDER — ESTRADIOL 0.5 MG/1
0.5 TABLET ORAL DAILY
Qty: 30 TABLET | Refills: 11 | Status: SHIPPED | OUTPATIENT
Start: 2022-10-14 | End: 2022-10-19 | Stop reason: SDUPTHER

## 2022-10-14 NOTE — PROGRESS NOTES
66 yo female s/p HYST many years ago for fibroids who presents to discuss management of decreased libido.  Has heard that low estrogen levels may cause this.  No h/o DVT/PE/stroke/uncontrolled HTN.  No h/o breast cancer.  Ok with trying PO estradiol. Rx sent.  Patient counseled on risks of medication.    NITESH Maradiaga MD

## 2022-10-17 ENCOUNTER — PATIENT MESSAGE (OUTPATIENT)
Dept: OBSTETRICS AND GYNECOLOGY | Facility: CLINIC | Age: 67
End: 2022-10-17
Payer: COMMERCIAL

## 2022-10-19 DIAGNOSIS — R68.82 DECREASED LIBIDO: ICD-10-CM

## 2022-10-19 RX ORDER — ESTRADIOL 0.5 MG/1
0.5 TABLET ORAL DAILY
Qty: 30 TABLET | Refills: 11 | Status: SHIPPED | OUTPATIENT
Start: 2022-10-19 | End: 2023-07-18

## 2022-12-06 ENCOUNTER — OFFICE VISIT (OUTPATIENT)
Dept: FAMILY MEDICINE | Facility: CLINIC | Age: 67
End: 2022-12-06
Payer: COMMERCIAL

## 2022-12-06 VITALS
HEART RATE: 76 BPM | WEIGHT: 162.06 LBS | SYSTOLIC BLOOD PRESSURE: 130 MMHG | DIASTOLIC BLOOD PRESSURE: 76 MMHG | OXYGEN SATURATION: 99 % | HEIGHT: 61 IN | BODY MASS INDEX: 30.6 KG/M2 | TEMPERATURE: 98 F

## 2022-12-06 DIAGNOSIS — R03.0 ELEVATED BLOOD PRESSURE READING: ICD-10-CM

## 2022-12-06 DIAGNOSIS — J32.9 CHRONIC SINUSITIS, UNSPECIFIED LOCATION: ICD-10-CM

## 2022-12-06 DIAGNOSIS — M62.830 LUMBAR PARASPINAL MUSCLE SPASM: Primary | ICD-10-CM

## 2022-12-06 PROCEDURE — 1159F MED LIST DOCD IN RCRD: CPT | Mod: HCNC,CPTII,S$GLB, | Performed by: FAMILY MEDICINE

## 2022-12-06 PROCEDURE — 3044F HG A1C LEVEL LT 7.0%: CPT | Mod: HCNC,CPTII,S$GLB, | Performed by: FAMILY MEDICINE

## 2022-12-06 PROCEDURE — 3044F PR MOST RECENT HEMOGLOBIN A1C LEVEL <7.0%: ICD-10-PCS | Mod: HCNC,CPTII,S$GLB, | Performed by: FAMILY MEDICINE

## 2022-12-06 PROCEDURE — 3078F PR MOST RECENT DIASTOLIC BLOOD PRESSURE < 80 MM HG: ICD-10-PCS | Mod: HCNC,CPTII,S$GLB, | Performed by: FAMILY MEDICINE

## 2022-12-06 PROCEDURE — 1101F PT FALLS ASSESS-DOCD LE1/YR: CPT | Mod: HCNC,CPTII,S$GLB, | Performed by: FAMILY MEDICINE

## 2022-12-06 PROCEDURE — 1159F PR MEDICATION LIST DOCUMENTED IN MEDICAL RECORD: ICD-10-PCS | Mod: HCNC,CPTII,S$GLB, | Performed by: FAMILY MEDICINE

## 2022-12-06 PROCEDURE — 1101F PR PT FALLS ASSESS DOC 0-1 FALLS W/OUT INJ PAST YR: ICD-10-PCS | Mod: HCNC,CPTII,S$GLB, | Performed by: FAMILY MEDICINE

## 2022-12-06 PROCEDURE — 3288F PR FALLS RISK ASSESSMENT DOCUMENTED: ICD-10-PCS | Mod: HCNC,CPTII,S$GLB, | Performed by: FAMILY MEDICINE

## 2022-12-06 PROCEDURE — 3075F PR MOST RECENT SYSTOLIC BLOOD PRESS GE 130-139MM HG: ICD-10-PCS | Mod: HCNC,CPTII,S$GLB, | Performed by: FAMILY MEDICINE

## 2022-12-06 PROCEDURE — 99214 OFFICE O/P EST MOD 30 MIN: CPT | Mod: HCNC,S$GLB,, | Performed by: FAMILY MEDICINE

## 2022-12-06 PROCEDURE — 3008F PR BODY MASS INDEX (BMI) DOCUMENTED: ICD-10-PCS | Mod: HCNC,CPTII,S$GLB, | Performed by: FAMILY MEDICINE

## 2022-12-06 PROCEDURE — 99999 PR PBB SHADOW E&M-EST. PATIENT-LVL IV: ICD-10-PCS | Mod: PBBFAC,HCNC,, | Performed by: FAMILY MEDICINE

## 2022-12-06 PROCEDURE — 3075F SYST BP GE 130 - 139MM HG: CPT | Mod: HCNC,CPTII,S$GLB, | Performed by: FAMILY MEDICINE

## 2022-12-06 PROCEDURE — 99999 PR PBB SHADOW E&M-EST. PATIENT-LVL IV: CPT | Mod: PBBFAC,HCNC,, | Performed by: FAMILY MEDICINE

## 2022-12-06 PROCEDURE — 3078F DIAST BP <80 MM HG: CPT | Mod: HCNC,CPTII,S$GLB, | Performed by: FAMILY MEDICINE

## 2022-12-06 PROCEDURE — 3008F BODY MASS INDEX DOCD: CPT | Mod: HCNC,CPTII,S$GLB, | Performed by: FAMILY MEDICINE

## 2022-12-06 PROCEDURE — 99214 PR OFFICE/OUTPT VISIT, EST, LEVL IV, 30-39 MIN: ICD-10-PCS | Mod: HCNC,S$GLB,, | Performed by: FAMILY MEDICINE

## 2022-12-06 PROCEDURE — 3288F FALL RISK ASSESSMENT DOCD: CPT | Mod: HCNC,CPTII,S$GLB, | Performed by: FAMILY MEDICINE

## 2022-12-06 RX ORDER — FLUTICASONE PROPIONATE 50 MCG
2 SPRAY, SUSPENSION (ML) NASAL DAILY
Qty: 16 G | Refills: 11 | Status: SHIPPED | OUTPATIENT
Start: 2022-12-06

## 2022-12-06 RX ORDER — METHOCARBAMOL 500 MG/1
1000 TABLET, FILM COATED ORAL 3 TIMES DAILY PRN
Qty: 50 TABLET | Refills: 1 | Status: SHIPPED | OUTPATIENT
Start: 2022-12-06 | End: 2023-07-18

## 2022-12-06 RX ORDER — IBUPROFEN 800 MG/1
800 TABLET ORAL 3 TIMES DAILY PRN
Qty: 30 TABLET | Refills: 1 | Status: SHIPPED | OUTPATIENT
Start: 2022-12-06 | End: 2024-02-20

## 2022-12-06 NOTE — PATIENT INSTRUCTIONS
"Lifestyle choices to lower blood pressure    Diet  DASH diet--high in fruits/vegetables and low in fat and saturated fat: 8-14 points  Salt restriction--2.3 grams sodium/day: 2-8 points    Weight loss--5-20 points per 20 lbs lost.    Exercise--30min most days/wk: 4-9 points    Limit Alcohol--2/day men; 1/day women: 2-4 points.        The DASH Diet: Healthy Eating to Control Your Blood Pressure     What is the DASH diet?    DASH stands for Dietary Approaches to Stop Hypertension. It is a balanced eating plan that your family doctor might recommend to help you lower your blood pressure. The DASH diet:  Is low in salt, saturated fat, cholesterol and total fat.   Emphasizes fruits, vegetables, and fat-free or low-fat milk and milk products.   Includes whole grains, fish, poultry and nuts.   Limits the amount of red meat, sweets, added sugars and sugar-containing beverages in your diet.   Is rich in potassium, magnesium and calcium, as well as protein and fiber.     How can the DASH diet help me stay healthy?  Getting too much sodium (salt) in your diet can contribute to high blood pressure (also called hypertension). Some salt is in foods naturally, and some salt is added to food when it is processed or prepared. Following the DASH diet can help you lower your blood pressure, or prevent high blood pressure, by reducing the amount of sodium in your diet to less than 2,300 mg per day.  The fruits, vegetables and whole grains recommended in the DASH diet provide many other elements of a healthy diet, such as lycopene, beta-carotene and isoflavones. These can help protect your body against common health conditions, such as cancer, osteoporosis, stroke and diabetes. Following the DASH diet can also help reduce your risk of heart disease by lowering your low-density lipoprotein (LDL, or "bad") cholesterol level.  Following the DASH diet may drop your blood pressure by a few points in as little as 2 weeks. However, you should " not stop taking your blood pressure medicine, or any other prescribed medicine, without talking to your doctor first.    What kinds of foods are included in the DASH diet?  The DASH diet is nutritionally balanced for good health. You dont need to buy any special foods or pills, or cook with any special recipes, to follow the DASH diet. If you follow the DASH diet, you will eat about 2,000 calories each day. These calories will come from a variety of foods.    Where is sodium in my diet?  Food Serving Sodium Content   ¼ teaspoon table salt 575 mg   ½ teaspoon table salt 1,150 mg   1 teaspoon table salt 2,300 mg   1 hot dog 460 mg   1 regular fast-food hamburger 600 mg   2 ounces processed cheese 600 mg   1 tablespoon soy sauce 900 mg   1 serving frozen pizza with meat and vegetables 982 mg   8 ounces regular potato chips 1,192 mg     The DASH diet  Whole grains (6 to 8 servings a day)   Vegetables (4 to 5 servings a day)   Fruits (4 to 5 servings a day)   Low-fat or fat-free milk and milk products (2 to 3 servings a day)   Lean meats, poultry and fish (6 or fewer servings a day)   Nuts, seeds and beans (4 to 5 servings a week)   Fats and oils (2 to 3 servings a day)   Sweets, preferably low-fat or fat-free (5 or fewer a week)   Sodium (no more than 2,300 mg a day)   You can adapt the DASH diet to meet your needs. For example:  If you drink alcohol, limit yourself to 2 drinks or less per day for men and 1 drink or less per day for women.   To reduce your blood pressure even more, replace some of the carbohydrates in the DASH diet with low-fat protein and unsaturated fats.   If you need to lose weight, reduce the number of calories you eat to about 1,600 per day.   Follow a lower-sodium version (no more than 1,500 mg daily) if you are 40 years of age or older, you are  or you already have high blood pressure.     How can I change my eating habits?  Dont be discouraged if following the DASH diet is  "difficult at first. Start with small, achievable goals. The following ideas can help you make healthy changes:  Pay attention to your current eating habits. Make a list of everything you eat for 2 or 3 days. Compare this list to the DASH diet recommendations above and see what changes you need to make in your diet.   Make one change at a time. For example, start by choosing lower-fat versions of your favorite foods or adding more whole grains to your meals.   Learn what makes a serving for each type of food. For example, 1 serving equals 1 slice of bread, 8 ounces of milk, 1 cup of raw vegetables or 1/2 cup of cooked vegetables. For more serving sizes, go to the [facility] site. Dont have a measuring cup? One serving (3 ounces) of meat or poultry is about the size of a deck of cards. One serving (1/2 cup) of rice or potato looks like half a baseball, and a serving of cheese is about the size of 4 stacked dice.   If eating more fruits and vegetables gives you gas, bloating or diarrhea, increase these foods slowly. You can also talk to your family doctor about taking over-the-counter medicines to reduce these symptoms until your body adjusts.   Get more exercise. Physical activity helps lower your blood pressure and can help you lose more weight.   Use salt-free seasonings, such as spices and herbs, to add flavor to your recipes and reduce or eliminate salt.   Include as many fresh and unprocessed foods as possible. Cut back on frozen dinners, packaged mixes, canned soups and bottled salad dressings, which are often high in sodium.   When buying canned, frozen or processed foods, check nutrition labels for the amount of sodium, sugar and saturated fat. Look for the phrases "no salt added," "sodium-free," "low sodium" or "very low sodium" on food packages. Choose foods with monounsaturated and polyunsaturated fats.   Steam, grill, poach, roast or stir-zimmer foods. Use low-sodium broth or water instead of butter or oil for " sautéing.   When you eat at a restaurant, ask how foods are prepared. Ask if your order can be made without added salt. Dont add salty condiments, such as ketchup, mustard, pickles or sauces, to your food.   Other Organizations  Centers for Disease Control and Prevention   National Heart, [employer], and [employer] [place]   Written by familydoctor.org editorial staff  Reviewed/Updated: 02/11  Created: 08/09

## 2022-12-06 NOTE — LETTER
December 6, 2022      [facility]  [address]  [place] [address]  Phone: [phone]  Fax: [phone]       Patient: Rachael June   YOB: 1955  Date of Visit: 12/06/2022    To Whom It May Concern:    Piper June  was at [facility] on 12/06/2022. The patient may return to work on 12/8/22 with no restrictions. If you have any questions or concerns, or if I can be of further assistance, please do not hesitate to contact me.    Sincerely,    Josefa Toussaint MA

## 2022-12-06 NOTE — PROGRESS NOTES
(Portions of this note were dictated using voice recognition software and may contain dictation related errors in spelling/grammar/syntax not found on text review)    CC:   Chief Complaint   Patient presents with    Back Pain     X4 days, worsening each day       HPI: 67 y.o. female presents for urgent care symptom of back pain.  Symptoms started about 4 days ago, felt a slight twinge in the right low back when she went to the store, was carrying some bags but does not relate any significantly heavy lifting or any direct trauma.  Over the next several days she noticed significant worsening of that pain.  Feels lot of spasm in the low back on the left side.  This morning felt a bit of pain in the posterior thigh on the left but has not had any consistent pain down the leg.  No paresthesias.  Took some Tylenol, Salonpas patches, heat, did not really seem to help much.  Occasionally will get back pains but nothing on a chronic basis.      Prior imaging on file:  Lumbar x-ray 2016, normal vertebral body heights and alignment, no acute osseous abnormality.  Pelvic MRI in 2016 for adnexal mass follow-up showed no concerning adnexal masses, no osseous abnormalities in the pelvis.    Needs refill of Flonase     She also noticed some occasional elevated blood pressures at home.  She shows me her record at home, the far majority of blood pressures are in the 120s to 130s over 70s to 80s but every now and then she gets a couple of readings here and there of 140-150 range over 80.  She states that it could be related to dietary factors during those times.  She will notice she has a headache during those times.  Blood pressure today is 130/76.  Does occasionally gets some ankle swelling.  She states that in the past she was on a diuretic to help with blood pressure and swelling, is not on anything like that now but wonders if she needs to be back on something.  She is exercising more regularly.      Answers submitted by the  patient for this visit:  Back Pain Questionnaire (Submitted on 12/6/2022)  Chief Complaint: Back pain  Chronicity: new  Onset: in the past 7 days  Frequency: constantly  Progression since onset: gradually worsening  Pain location: lumbar spine  Pain quality: stabbing  Radiates to: does not radiate  Pain - numeric: 8/10  Pain is: worse during the day  Aggravated by: position  Stiffness is present: all day  abdominal pain: No  bladder incontinence: No  bowel incontinence: No  chest pain: No  dysuria: No  fever: No  headaches: Yes  leg pain: No  numbness: No  paresis: No  paresthesias: No  pelvic pain: No  weight loss: No  genital pain: No  hematuria: No  Risk factors: history of osteoporosis, menopause, obesity, poor posture  Pain severity: severe  Improvement on treatment: mild      Past Medical History:   Diagnosis Date    Abnormal Pap smear of cervix     Colon polyp     Condition not found     h/o abnl vaginal cuff biopsy-mild dysplasia    Dysplasia of vagina     vaginal cuff biopsy    Genital herpes, unspecified     GERD (gastroesophageal reflux disease)     Hyperlipidemia     Insulinoma     s/p resection    Special screening for malignant neoplasms, colon 7/6/2015       Past Surgical History:   Procedure Laterality Date    COLONOSCOPY N/A 6/8/2017    Procedure: COLONOSCOPY;  Surgeon: Chadd Davis MD;  Location: [address]);  Service: Endoscopy;  Laterality: N/A;    COLONOSCOPY W/ POLYPECTOMY      HYSTERECTOMY      fibroids    insulinoma resection      SHOULDER ARTHROSCOPY Left 9/23/2015    Dr Rm        Family History   Problem Relation Age of Onset    Heart disease Mother     Hypertension Mother     Diabetes Mother     Hyperlipidemia Mother     Stroke Father     Diabetes Father     Heart disease Father     Cancer Sister         breast cancer    Diabetes Sister     Breast cancer Sister     Cancer Maternal Aunt         ovarian cancer    Diabetes Brother     Diabetes Sister     Heart disease  "Brother        Social History     Tobacco Use    Smoking status: Never    Smokeless tobacco: Never   Substance Use Topics    Alcohol use: No    Drug use: No       Lab Results   Component Value Date    WBC 5.26 08/24/2022    HGB 13.3 08/24/2022    HCT 40.1 08/24/2022    MCV 91 08/24/2022     08/24/2022    CHOL 178 07/15/2022    TRIG 54 07/15/2022    HDL 69 07/15/2022    ALT 18 08/24/2022    AST 18 08/24/2022    BILITOT 0.7 08/24/2022    ALKPHOS 109 08/24/2022     08/24/2022    K 3.9 08/24/2022     08/24/2022    CREATININE 0.7 08/24/2022    ESTGFRAFRICA >60.0 07/15/2022    EGFRNONAA >60.0 07/15/2022    EGFRNORACEVR >60 08/24/2022    CALCIUM 9.7 08/24/2022    ALBUMIN 4.1 08/24/2022    BUN 9 08/24/2022    CO2 25 08/24/2022    TSH 1.264 07/15/2022    INR 1.0 01/15/2015    HGBA1C 5.9 (H) 07/15/2022    MICALBCREAT 10.0 10/29/2008    LDLCALC 98.2 07/15/2022    GLU 94 08/24/2022    [id number] 35 06/06/2017                 Vital signs reviewed  Vitals:    12/06/22 1407   BP: 130/76   BP Location: Right arm   Patient Position: Sitting   BP Method: Medium (Manual)   Pulse: 76   Temp: 98.1 °F (36.7 °C)   TempSrc: Oral   SpO2: 99%   Weight: 73.5 kg (162 lb 0.6 oz)   Height: 5' 1" (1.549 m)       Wt Readings from Last 4 Encounters:   12/06/22 73.5 kg (162 lb 0.6 oz)   10/14/22 74.7 kg (164 lb 10.9 oz)   08/29/22 73.3 kg (161 lb 9.6 oz)   07/14/22 75.7 kg (166 lb 14.2 oz)       PE:   APPEARANCE: Well nourished, well developed, in no acute distress.    HEAD: Normocephalic, atraumatic.  MSK/neuro:  Left paralumbar tenderness and spasm.  No midline lumbar tenderness.  No right paralumbar tenderness.  Negative straight leg raise bilaterally.  2+ knee and ankle reflexes bilaterally.  Lumbar range of motion significantly limited secondary to pain especially flexion extension and ipsilateral bending to the left      IMPRESSION  1. Lumbar paraspinal muscle spasm    2. Chronic sinusitis, unspecified location    3. " Elevated blood pressure reading            PLAN  Lumbar spasm: Ibuprofen p.r.n. 800 mg t.i.d., take with food.  Robaxin 500-1000 mg t.i.d. p.r.n. muscle spasm, caution regarding drowsiness.  Advise heat application, topical analgesics as necessary.  After the next several days can try to work in some lumbar exercises, printed.  Notify for significant worsening or radiating radicular symptoms    Refill Flonase     Transiently elevated blood pressure readings at home, largely normal in between.  Would advise strict attention to dietary factors including sodium intake.  Dash diet literature provided.  Continue regular exercise.  Continue regular monitoring.  If more consistent elevated blood pressures, may need to consider pharmacotherapy

## 2023-04-10 ENCOUNTER — OFFICE VISIT (OUTPATIENT)
Dept: OPTOMETRY | Facility: CLINIC | Age: 68
End: 2023-04-10
Payer: MEDICARE

## 2023-04-10 DIAGNOSIS — H02.886 MEIBOMIAN GLAND DYSFUNCTION (MGD) OF BOTH EYES: ICD-10-CM

## 2023-04-10 DIAGNOSIS — H52.4 PRESBYOPIA: ICD-10-CM

## 2023-04-10 DIAGNOSIS — H02.883 MEIBOMIAN GLAND DYSFUNCTION (MGD) OF BOTH EYES: ICD-10-CM

## 2023-04-10 DIAGNOSIS — H25.13 NS (NUCLEAR SCLEROSIS), BILATERAL: Primary | ICD-10-CM

## 2023-04-10 DIAGNOSIS — G47.33 OSA (OBSTRUCTIVE SLEEP APNEA): ICD-10-CM

## 2023-04-10 DIAGNOSIS — H04.123 DRY EYE SYNDROME, BILATERAL: ICD-10-CM

## 2023-04-10 PROCEDURE — 1101F PT FALLS ASSESS-DOCD LE1/YR: CPT | Mod: CPTII,S$GLB,, | Performed by: OPTOMETRIST

## 2023-04-10 PROCEDURE — 99204 OFFICE O/P NEW MOD 45 MIN: CPT | Mod: S$GLB,,, | Performed by: OPTOMETRIST

## 2023-04-10 PROCEDURE — 1160F PR REVIEW ALL MEDS BY PRESCRIBER/CLIN PHARMACIST DOCUMENTED: ICD-10-PCS | Mod: CPTII,S$GLB,, | Performed by: OPTOMETRIST

## 2023-04-10 PROCEDURE — 1126F PR PAIN SEVERITY QUANTIFIED, NO PAIN PRESENT: ICD-10-PCS | Mod: CPTII,S$GLB,, | Performed by: OPTOMETRIST

## 2023-04-10 PROCEDURE — 92015 PR REFRACTION: ICD-10-PCS | Mod: S$GLB,,, | Performed by: OPTOMETRIST

## 2023-04-10 PROCEDURE — 1101F PR PT FALLS ASSESS DOC 0-1 FALLS W/OUT INJ PAST YR: ICD-10-PCS | Mod: CPTII,S$GLB,, | Performed by: OPTOMETRIST

## 2023-04-10 PROCEDURE — 99999 PR PBB SHADOW E&M-EST. PATIENT-LVL III: CPT | Mod: PBBFAC,,, | Performed by: OPTOMETRIST

## 2023-04-10 PROCEDURE — 3288F PR FALLS RISK ASSESSMENT DOCUMENTED: ICD-10-PCS | Mod: CPTII,S$GLB,, | Performed by: OPTOMETRIST

## 2023-04-10 PROCEDURE — 99204 PR OFFICE/OUTPT VISIT, NEW, LEVL IV, 45-59 MIN: ICD-10-PCS | Mod: S$GLB,,, | Performed by: OPTOMETRIST

## 2023-04-10 PROCEDURE — 1160F RVW MEDS BY RX/DR IN RCRD: CPT | Mod: CPTII,S$GLB,, | Performed by: OPTOMETRIST

## 2023-04-10 PROCEDURE — 1159F MED LIST DOCD IN RCRD: CPT | Mod: CPTII,S$GLB,, | Performed by: OPTOMETRIST

## 2023-04-10 PROCEDURE — 1126F AMNT PAIN NOTED NONE PRSNT: CPT | Mod: CPTII,S$GLB,, | Performed by: OPTOMETRIST

## 2023-04-10 PROCEDURE — 1159F PR MEDICATION LIST DOCUMENTED IN MEDICAL RECORD: ICD-10-PCS | Mod: CPTII,S$GLB,, | Performed by: OPTOMETRIST

## 2023-04-10 PROCEDURE — 3288F FALL RISK ASSESSMENT DOCD: CPT | Mod: CPTII,S$GLB,, | Performed by: OPTOMETRIST

## 2023-04-10 PROCEDURE — 99999 PR PBB SHADOW E&M-EST. PATIENT-LVL III: ICD-10-PCS | Mod: PBBFAC,,, | Performed by: OPTOMETRIST

## 2023-04-10 PROCEDURE — 92015 DETERMINE REFRACTIVE STATE: CPT | Mod: S$GLB,,, | Performed by: OPTOMETRIST

## 2023-04-10 RX ORDER — FLUOCINONIDE GEL 0.5 MG/G
GEL TOPICAL
COMMUNITY
Start: 2023-02-16

## 2023-04-21 NOTE — PROGRESS NOTES
HPI    Patient here today for REE. VA stable ou, c/o photophobia and frequent   styes upper-lids ou, denies any f/f.    No gtts  Last edited by Marielle Romo on 4/10/2023  3:38 PM.            Assessment /Plan     For exam results, see Encounter Report.       NS (nuclear sclerosis), bilateral  Cortical cataract of both eyes               Mild, monitor     Presbyopia              Rx specs, also discussed computer/ reading bifocal    KEMAL (obstructive sleep apnea)     Dry eye syndrome, bilateral  Meibomian gland dysfunction (MGD) of both eyes  Warm compresses  Ocusoft lid scrubs  Hypochlor spray QHS    PF art tears BID      RTC 1 year, sooner PRN

## 2023-05-08 ENCOUNTER — OFFICE VISIT (OUTPATIENT)
Dept: FAMILY MEDICINE | Facility: CLINIC | Age: 68
End: 2023-05-08
Attending: FAMILY MEDICINE
Payer: MEDICARE

## 2023-05-08 VITALS
BODY MASS INDEX: 31.01 KG/M2 | OXYGEN SATURATION: 98 % | SYSTOLIC BLOOD PRESSURE: 139 MMHG | HEART RATE: 70 BPM | WEIGHT: 164.25 LBS | TEMPERATURE: 98 F | DIASTOLIC BLOOD PRESSURE: 89 MMHG | HEIGHT: 61 IN

## 2023-05-08 DIAGNOSIS — L30.9 ACUTE DERMATITIS: Primary | ICD-10-CM

## 2023-05-08 PROCEDURE — 1160F RVW MEDS BY RX/DR IN RCRD: CPT | Mod: CPTII,S$GLB,, | Performed by: FAMILY MEDICINE

## 2023-05-08 PROCEDURE — 99214 PR OFFICE/OUTPT VISIT, EST, LEVL IV, 30-39 MIN: ICD-10-PCS | Mod: S$GLB,,, | Performed by: FAMILY MEDICINE

## 2023-05-08 PROCEDURE — 99999 PR PBB SHADOW E&M-EST. PATIENT-LVL III: ICD-10-PCS | Mod: PBBFAC,,, | Performed by: FAMILY MEDICINE

## 2023-05-08 PROCEDURE — 3075F PR MOST RECENT SYSTOLIC BLOOD PRESS GE 130-139MM HG: ICD-10-PCS | Mod: CPTII,S$GLB,, | Performed by: FAMILY MEDICINE

## 2023-05-08 PROCEDURE — 3008F PR BODY MASS INDEX (BMI) DOCUMENTED: ICD-10-PCS | Mod: CPTII,S$GLB,, | Performed by: FAMILY MEDICINE

## 2023-05-08 PROCEDURE — 3075F SYST BP GE 130 - 139MM HG: CPT | Mod: CPTII,S$GLB,, | Performed by: FAMILY MEDICINE

## 2023-05-08 PROCEDURE — 1160F PR REVIEW ALL MEDS BY PRESCRIBER/CLIN PHARMACIST DOCUMENTED: ICD-10-PCS | Mod: CPTII,S$GLB,, | Performed by: FAMILY MEDICINE

## 2023-05-08 PROCEDURE — 99999 PR PBB SHADOW E&M-EST. PATIENT-LVL III: CPT | Mod: PBBFAC,,, | Performed by: FAMILY MEDICINE

## 2023-05-08 PROCEDURE — 3079F DIAST BP 80-89 MM HG: CPT | Mod: CPTII,S$GLB,, | Performed by: FAMILY MEDICINE

## 2023-05-08 PROCEDURE — 3008F BODY MASS INDEX DOCD: CPT | Mod: CPTII,S$GLB,, | Performed by: FAMILY MEDICINE

## 2023-05-08 PROCEDURE — 99214 OFFICE O/P EST MOD 30 MIN: CPT | Mod: S$GLB,,, | Performed by: FAMILY MEDICINE

## 2023-05-08 PROCEDURE — 1159F PR MEDICATION LIST DOCUMENTED IN MEDICAL RECORD: ICD-10-PCS | Mod: CPTII,S$GLB,, | Performed by: FAMILY MEDICINE

## 2023-05-08 PROCEDURE — 1159F MED LIST DOCD IN RCRD: CPT | Mod: CPTII,S$GLB,, | Performed by: FAMILY MEDICINE

## 2023-05-08 PROCEDURE — 3079F PR MOST RECENT DIASTOLIC BLOOD PRESSURE 80-89 MM HG: ICD-10-PCS | Mod: CPTII,S$GLB,, | Performed by: FAMILY MEDICINE

## 2023-05-08 RX ORDER — TRIAMCINOLONE ACETONIDE 1 MG/G
CREAM TOPICAL 2 TIMES DAILY
Qty: 15 G | Refills: 1 | Status: SHIPPED | OUTPATIENT
Start: 2023-05-08 | End: 2023-07-18

## 2023-05-08 NOTE — PROGRESS NOTES
Subjective     Patient ID: Rachael June is a 67 y.o. female.    Chief Complaint: Rash    67 yr old pleasant female with GERD, KEMAL, and other co morbidities presents today as new patient to me and for evaluation of rash in her back. Itching and redness and some pain. No discharge. No fever or chills. Details as follows -      Rash  This is a new problem. The current episode started in the past 7 days. The problem is unchanged. The affected locations include the back. The rash is characterized by redness and swelling. She was exposed to nothing. Pertinent negatives include no congestion, eye pain, rhinorrhea, shortness of breath or sore throat. Past treatments include nothing. The treatment provided no relief. There is no history of allergies, asthma, eczema or varicella.   Review of Systems   Constitutional: Negative.  Negative for activity change, diaphoresis and unexpected weight change.   HENT: Negative.  Negative for nasal congestion, ear discharge, hearing loss, rhinorrhea, sore throat and voice change.    Eyes: Negative.  Negative for pain, discharge and visual disturbance.   Respiratory: Negative.  Negative for chest tightness, shortness of breath and wheezing.    Cardiovascular: Negative.  Negative for chest pain.   Gastrointestinal: Negative.  Negative for abdominal distention, anal bleeding, constipation and nausea.   Endocrine: Negative.  Negative for cold intolerance, polydipsia and polyuria.   Genitourinary: Negative.  Negative for decreased urine volume, difficulty urinating, dysuria, frequency, menstrual problem and vaginal pain.   Musculoskeletal: Negative.  Negative for arthralgias, gait problem and myalgias.   Integumentary:  Positive for rash. Negative for color change, pallor and wound.   Allergic/Immunologic: Negative.  Negative for environmental allergies and immunocompromised state.   Neurological: Negative.  Negative for dizziness, tremors, seizures, speech difficulty and headaches.    Hematological: Negative.  Negative for adenopathy. Does not bruise/bleed easily.   Psychiatric/Behavioral: Negative.  Negative for agitation, confusion, decreased concentration, hallucinations, self-injury and suicidal ideas. The patient is not nervous/anxious.         Past Medical History:   Diagnosis Date    Abnormal Pap smear of cervix     Colon polyp     Condition not found     h/o abnl vaginal cuff biopsy-mild dysplasia    Dysplasia of vagina     vaginal cuff biopsy    Genital herpes, unspecified     GERD (gastroesophageal reflux disease)     Hyperlipidemia     Insulinoma     s/p resection    Special screening for malignant neoplasms, colon 7/6/2015       Past Surgical History:   Procedure Laterality Date    COLONOSCOPY N/A 6/8/2017    Procedure: COLONOSCOPY;  Surgeon: Chadd Davis MD;  Location: [address]);  Service: Endoscopy;  Laterality: N/A;    COLONOSCOPY W/ POLYPECTOMY      HYSTERECTOMY      fibroids    insulinoma resection      SHOULDER ARTHROSCOPY Left 9/23/2015    Dr Rm        Family History   Problem Relation Age of Onset    Heart disease Mother     Hypertension Mother     Diabetes Mother     Hyperlipidemia Mother     Stroke Father     Diabetes Father     Heart disease Father     Cancer Sister         breast cancer    Diabetes Sister     Breast cancer Sister     Cancer Maternal Aunt         ovarian cancer    Diabetes Brother     Diabetes Sister     Heart disease Brother        Social History     Socioeconomic History    Marital status:     Number of children: 3   Occupational History    Occupation:      Employer: [employer]   Tobacco Use    Smoking status: Never    Smokeless tobacco: Never   Substance and Sexual Activity    Alcohol use: No    Drug use: No    Sexual activity: Yes     Partners: Male     Birth control/protection: Surgical   Social History Narrative    She exercises every day.she has 8 grandchildren.       Current Outpatient Medications    Medication Sig Dispense Refill    aspirin (ECOTRIN) 81 MG EC tablet Take 81 mg by mouth once daily.      b complex vitamins capsule Take 1 capsule by mouth once daily.      biotin 1 mg tablet Take 1,000 mcg by mouth once daily.      calcium carbonate (OS-ANDREW) 600 mg calcium (1,500 mg) Tab Take 1 tablet by mouth once daily.      estradioL (ESTRACE) 0.5 MG tablet Take 1 tablet (0.5 mg total) by mouth once daily. 30 tablet 11    famotidine (PEPCID) 20 MG tablet Take 20 mg by mouth daily as needed for Heartburn.      fish oil-omega-3 fatty acids 300-1,000 mg capsule Take 2 g by mouth once daily.      fluocinonide (LIDEX) 0.05 % gel SMARTSIG:sparingly Topical 2-4 Times Daily      fluticasone propionate (FLONASE) 50 mcg/actuation nasal spray 2 sprays (100 mcg total) by Each Nostril route once daily. 16 g 11    GINGER ROOT, BULK, MISC by Misc.(Non-Drug; Combo Route) route.      ibuprofen (ADVIL,MOTRIN) 800 MG tablet Take 1 tablet (800 mg total) by mouth 3 (three) times daily as needed for Pain. 30 tablet 1    methocarbamoL (ROBAXIN) 500 MG Tab Take 2 tablets (1,000 mg total) by mouth 3 (three) times daily as needed (muscle spasm). (Patient not taking: Reported on 4/10/2023) 50 tablet 1    multivitamin capsule Take 1 capsule by mouth once daily.      pantoprazole (PROTONIX) 40 MG tablet TAKE 1 TABLET(40 MG) BY MOUTH EVERY DAY 90 tablet 3    pravastatin (PRAVACHOL) 40 MG tablet Take 1 tablet (40 mg total) by mouth once daily. (Patient taking differently: Take 40 mg by mouth every evening.) 90 tablet 3    sod picosulf-mag ox-citric ac (CLENPIQ) 10 mg-3.5 gram -12 gram/160 mL Soln once      sucralfate (CARAFATE) 1 gram tablet Take 1 tablet (1 g total) by mouth 4 (four) times daily with meals and nightly. 60 tablet 0    triamcinolone acetonide 0.1% (KENALOG) 0.1 % cream Apply topically 2 (two) times daily. 15 g 1    valACYclovir (VALTREX) 1000 MG tablet Take 1 tablet (1,000 mg total) by mouth once daily. 1/2 tablet daily  (Patient taking differently: Take 500 mg by mouth once daily. 1/2 tablet daily) 45 tablet 3    vitamin D 1000 units Tab Take 1,000 Units by mouth once daily.       No current facility-administered medications for this visit.       Review of patient's allergies indicates:   Allergen Reactions    Adhesive tape-silicones     Pcn [penicillins]        Objective   Vitals:    05/08/23 1550   BP: 139/89   Pulse: 70   Temp: 98 °F (36.7 °C)       Physical Exam  Constitutional:       General: She is not in acute distress.     Appearance: She is well-developed. She is not diaphoretic.   HENT:      Head: Normocephalic and atraumatic.      Right Ear: External ear normal.      Left Ear: External ear normal.      Nose: Nose normal.      Mouth/Throat:      Pharynx: No oropharyngeal exudate.   Eyes:      General: No scleral icterus.        Right eye: No discharge.         Left eye: No discharge.      Conjunctiva/sclera: Conjunctivae normal.      Pupils: Pupils are equal, round, and reactive to light.   Neck:      Thyroid: No thyromegaly.      Vascular: No JVD.      Trachea: No tracheal deviation.   Cardiovascular:      Rate and Rhythm: Normal rate and regular rhythm.      Heart sounds: Normal heart sounds. No murmur heard.    No friction rub. No gallop.   Pulmonary:      Effort: Pulmonary effort is normal.      Breath sounds: Normal breath sounds. No stridor. No wheezing or rales.   Chest:      Chest wall: No tenderness.   Abdominal:      General: Bowel sounds are normal. There is no distension.      Palpations: Abdomen is soft. There is no mass.      Tenderness: There is no abdominal tenderness. There is no guarding or rebound.      Hernia: No hernia is present.   Musculoskeletal:         General: No tenderness. Normal range of motion.      Cervical back: Normal range of motion and neck supple.   Lymphadenopathy:      Cervical: No cervical adenopathy.   Skin:     General: Skin is warm and dry.      Coloration: Skin is not pale.       Findings: Erythema and rash present.      Comments: Mild erythematous patchy rash on the left upper back+   Neurological:      Mental Status: She is alert and oriented to person, place, and time.      Cranial Nerves: No cranial nerve deficit.      Motor: No abnormal muscle tone.      Coordination: Coordination normal.      Deep Tendon Reflexes: Reflexes are normal and symmetric. Reflexes normal.   Psychiatric:         Behavior: Behavior normal.         Thought Content: Thought content normal.         Judgment: Judgment normal.          Assessment and Plan     Problem List Items Addressed This Visit    None  Visit Diagnoses       Acute dermatitis    -  Primary    Relevant Medications    triamcinolone acetonide 0.1% (KENALOG) 0.1 % cream            Rachael was seen today for rash.    Diagnoses and all orders for this visit:    Acute dermatitis  -     triamcinolone acetonide 0.1% (KENALOG) 0.1 % cream; Apply topically 2 (two) times daily.      Acute dermatitis  -trial of TAC cream as direted mixed with aspercreme as directed  -derm and shingles precautions given    Spent adequate time in obtaining history and explaining differentials    30 minutes spent during this visit of which greater than 50% devoted to face-face counseling and coordination of care regarding diagnosis and management plan    Follow up if symptoms worsen or fail to improve.

## 2023-05-08 NOTE — LETTER
May 8, 2023      [facility]  [address]  [place] [address]  Phone: [phone]  Fax: [phone]       Patient: Rachael June   YOB: 1955  Date of Visit: 05/08/2023    To Whom It May Concern:    Piper June  was at [facility] on 05/08/2023. The patient may return to work/school on 5/9/23 with no restrictions. If you have any questions or concerns, or if I can be of further assistance, please do not hesitate to contact me.    Sincerely,      Dr. Norris Brock

## 2023-05-10 ENCOUNTER — PATIENT MESSAGE (OUTPATIENT)
Dept: DERMATOLOGY | Facility: CLINIC | Age: 68
End: 2023-05-10
Payer: MEDICARE

## 2023-06-06 ENCOUNTER — PES CALL (OUTPATIENT)
Dept: ADMINISTRATIVE | Facility: CLINIC | Age: 68
End: 2023-06-06
Payer: MEDICARE

## 2023-06-07 ENCOUNTER — PES CALL (OUTPATIENT)
Dept: ADMINISTRATIVE | Facility: CLINIC | Age: 68
End: 2023-06-07
Payer: MEDICARE

## 2023-06-21 ENCOUNTER — OFFICE VISIT (OUTPATIENT)
Dept: SPORTS MEDICINE | Facility: CLINIC | Age: 68
End: 2023-06-21
Payer: MEDICARE

## 2023-06-21 ENCOUNTER — HOSPITAL ENCOUNTER (OUTPATIENT)
Dept: RADIOLOGY | Facility: HOSPITAL | Age: 68
Discharge: HOME | End: 2023-06-21
Attending: ORTHOPAEDIC SURGERY
Payer: MEDICARE

## 2023-06-21 VITALS
HEART RATE: 71 BPM | HEIGHT: 61 IN | DIASTOLIC BLOOD PRESSURE: 69 MMHG | SYSTOLIC BLOOD PRESSURE: 138 MMHG | BODY MASS INDEX: 31.43 KG/M2 | WEIGHT: 166.5 LBS

## 2023-06-21 DIAGNOSIS — M25.512 LEFT SHOULDER PAIN, UNSPECIFIED CHRONICITY: ICD-10-CM

## 2023-06-21 DIAGNOSIS — M75.52 SUBACROMIAL BURSITIS OF LEFT SHOULDER JOINT: Primary | ICD-10-CM

## 2023-06-21 DIAGNOSIS — R52 PAIN: ICD-10-CM

## 2023-06-21 PROCEDURE — 20611 LARGE JOINT ASPIRATION/INJECTION: L SUBACROMIAL BURSA: ICD-10-PCS | Mod: LT,S$GLB,, | Performed by: ORTHOPAEDIC SURGERY

## 2023-06-21 PROCEDURE — 1125F PR PAIN SEVERITY QUANTIFIED, PAIN PRESENT: ICD-10-PCS | Mod: CPTII,S$GLB,, | Performed by: ORTHOPAEDIC SURGERY

## 2023-06-21 PROCEDURE — 99999 PR PBB SHADOW E&M-EST. PATIENT-LVL IV: CPT | Mod: PBBFAC,,, | Performed by: ORTHOPAEDIC SURGERY

## 2023-06-21 PROCEDURE — 73030 X-RAY EXAM OF SHOULDER: CPT | Mod: 26,LT,, | Performed by: RADIOLOGY

## 2023-06-21 PROCEDURE — 99204 OFFICE O/P NEW MOD 45 MIN: CPT | Mod: 25,S$GLB,, | Performed by: ORTHOPAEDIC SURGERY

## 2023-06-21 PROCEDURE — 3078F PR MOST RECENT DIASTOLIC BLOOD PRESSURE < 80 MM HG: ICD-10-PCS | Mod: CPTII,S$GLB,, | Performed by: ORTHOPAEDIC SURGERY

## 2023-06-21 PROCEDURE — 99204 PR OFFICE/OUTPT VISIT, NEW, LEVL IV, 45-59 MIN: ICD-10-PCS | Mod: 25,S$GLB,, | Performed by: ORTHOPAEDIC SURGERY

## 2023-06-21 PROCEDURE — 1125F AMNT PAIN NOTED PAIN PRSNT: CPT | Mod: CPTII,S$GLB,, | Performed by: ORTHOPAEDIC SURGERY

## 2023-06-21 PROCEDURE — 99999 PR PBB SHADOW E&M-EST. PATIENT-LVL IV: ICD-10-PCS | Mod: PBBFAC,,, | Performed by: ORTHOPAEDIC SURGERY

## 2023-06-21 PROCEDURE — 1101F PR PT FALLS ASSESS DOC 0-1 FALLS W/OUT INJ PAST YR: ICD-10-PCS | Mod: CPTII,S$GLB,, | Performed by: ORTHOPAEDIC SURGERY

## 2023-06-21 PROCEDURE — 73030 XR SHOULDER COMPLETE 2 OR MORE VIEWS LEFT: ICD-10-PCS | Mod: 26,LT,, | Performed by: RADIOLOGY

## 2023-06-21 PROCEDURE — 3008F BODY MASS INDEX DOCD: CPT | Mod: CPTII,S$GLB,, | Performed by: ORTHOPAEDIC SURGERY

## 2023-06-21 PROCEDURE — 3288F PR FALLS RISK ASSESSMENT DOCUMENTED: ICD-10-PCS | Mod: CPTII,S$GLB,, | Performed by: ORTHOPAEDIC SURGERY

## 2023-06-21 PROCEDURE — 20611 DRAIN/INJ JOINT/BURSA W/US: CPT | Mod: LT,S$GLB,, | Performed by: ORTHOPAEDIC SURGERY

## 2023-06-21 PROCEDURE — 3075F SYST BP GE 130 - 139MM HG: CPT | Mod: CPTII,S$GLB,, | Performed by: ORTHOPAEDIC SURGERY

## 2023-06-21 PROCEDURE — 3078F DIAST BP <80 MM HG: CPT | Mod: CPTII,S$GLB,, | Performed by: ORTHOPAEDIC SURGERY

## 2023-06-21 PROCEDURE — 1159F MED LIST DOCD IN RCRD: CPT | Mod: CPTII,S$GLB,, | Performed by: ORTHOPAEDIC SURGERY

## 2023-06-21 PROCEDURE — 3288F FALL RISK ASSESSMENT DOCD: CPT | Mod: CPTII,S$GLB,, | Performed by: ORTHOPAEDIC SURGERY

## 2023-06-21 PROCEDURE — 1159F PR MEDICATION LIST DOCUMENTED IN MEDICAL RECORD: ICD-10-PCS | Mod: CPTII,S$GLB,, | Performed by: ORTHOPAEDIC SURGERY

## 2023-06-21 PROCEDURE — 3075F PR MOST RECENT SYSTOLIC BLOOD PRESS GE 130-139MM HG: ICD-10-PCS | Mod: CPTII,S$GLB,, | Performed by: ORTHOPAEDIC SURGERY

## 2023-06-21 PROCEDURE — 73030 X-RAY EXAM OF SHOULDER: CPT | Mod: TC,LT

## 2023-06-21 PROCEDURE — 1101F PT FALLS ASSESS-DOCD LE1/YR: CPT | Mod: CPTII,S$GLB,, | Performed by: ORTHOPAEDIC SURGERY

## 2023-06-21 PROCEDURE — 3008F PR BODY MASS INDEX (BMI) DOCUMENTED: ICD-10-PCS | Mod: CPTII,S$GLB,, | Performed by: ORTHOPAEDIC SURGERY

## 2023-06-21 RX ORDER — METHYLPREDNISOLONE ACETATE 40 MG/ML
40 INJECTION, SUSPENSION INTRA-ARTICULAR; INTRALESIONAL; INTRAMUSCULAR; SOFT TISSUE
Status: DISCONTINUED | OUTPATIENT
Start: 2023-06-21 | End: 2023-06-21 | Stop reason: HOSPADM

## 2023-06-21 RX ADMIN — METHYLPREDNISOLONE ACETATE 40 MG: 40 INJECTION, SUSPENSION INTRA-ARTICULAR; INTRALESIONAL; INTRAMUSCULAR; SOFT TISSUE at 02:06

## 2023-06-21 NOTE — PROGRESS NOTES
NEW PATIENT    HISTORY OF PRESENT ILLNESS   67 y.o. Female with a history of left shoulder pain who works with the [employer]. She has a history of a left rotator cuff repair in 2015. She states she had a fall last year and cannot remember if she fell on the shoulder. She has been feeling a pulling sensation in her shoulder since the fall. She states yesterday she has been having posterior shoulder pain. She was vacuuming and used a different attachment, otherwise does not recall a specific JENARO. She states after her surgery she does not feel like her shoulder ever got back to normal.    Is affecting ADLs.  Pain is 7/10 at it's worst.        PAST MEDICAL HISTORY    Past Medical History:   Diagnosis Date    Abnormal Pap smear of cervix     Colon polyp     Condition not found     h/o abnl vaginal cuff biopsy-mild dysplasia    Dysplasia of vagina     vaginal cuff biopsy    Genital herpes, unspecified     GERD (gastroesophageal reflux disease)     Hyperlipidemia     Insulinoma     s/p resection    Special screening for malignant neoplasms, colon 7/6/2015       PAST SURGICAL HISTORY     Past Surgical History:   Procedure Laterality Date    COLONOSCOPY N/A 6/8/2017    Procedure: COLONOSCOPY;  Surgeon: Chadd Davis MD;  Location: [address]);  Service: Endoscopy;  Laterality: N/A;    COLONOSCOPY W/ POLYPECTOMY      HYSTERECTOMY      fibroids    insulinoma resection      SHOULDER ARTHROSCOPY Left 9/23/2015    Dr Rm        FAMILY HISTORY    Family History   Problem Relation Age of Onset    Heart disease Mother     Hypertension Mother     Diabetes Mother     Hyperlipidemia Mother     Stroke Father     Diabetes Father     Heart disease Father     Cancer Sister         breast cancer    Diabetes Sister     Breast cancer Sister     Cancer Maternal Aunt         ovarian cancer    Diabetes Brother     Diabetes Sister     Heart disease Brother        SOCIAL HISTORY    Social History     Socioeconomic  History    Marital status:     Number of children: 3   Occupational History    Occupation: bank clerk     Employer: [employer]   Tobacco Use    Smoking status: Never    Smokeless tobacco: Never   Substance and Sexual Activity    Alcohol use: No    Drug use: No    Sexual activity: Yes     Partners: Male     Birth control/protection: Surgical   Social History Narrative    She exercises every day.she has 8 grandchildren.       MEDICATIONS      Current Outpatient Medications:     aspirin (ECOTRIN) 81 MG EC tablet, Take 81 mg by mouth once daily., Disp: , Rfl:     biotin 1 mg tablet, Take 1,000 mcg by mouth once daily., Disp: , Rfl:     calcium carbonate (OS-ANDREW) 600 mg calcium (1,500 mg) Tab, Take 1 tablet by mouth once daily., Disp: , Rfl:     estradioL (ESTRACE) 0.5 MG tablet, Take 1 tablet (0.5 mg total) by mouth once daily., Disp: 30 tablet, Rfl: 11    fish oil-omega-3 fatty acids 300-1,000 mg capsule, Take 2 g by mouth once daily., Disp: , Rfl:     fluticasone propionate (FLONASE) 50 mcg/actuation nasal spray, 2 sprays (100 mcg total) by Each Nostril route once daily., Disp: 16 g, Rfl: 11    GINGER ROOT, BULK, MISC, by Misc.(Non-Drug; Combo Route) route., Disp: , Rfl:     ibuprofen (ADVIL,MOTRIN) 800 MG tablet, Take 1 tablet (800 mg total) by mouth 3 (three) times daily as needed for Pain., Disp: 30 tablet, Rfl: 1    methocarbamoL (ROBAXIN) 500 MG Tab, Take 2 tablets (1,000 mg total) by mouth 3 (three) times daily as needed (muscle spasm)., Disp: 50 tablet, Rfl: 1    multivitamin capsule, Take 1 capsule by mouth once daily., Disp: , Rfl:     pantoprazole (PROTONIX) 40 MG tablet, TAKE 1 TABLET(40 MG) BY MOUTH EVERY DAY, Disp: 90 tablet, Rfl: 3    pravastatin (PRAVACHOL) 40 MG tablet, Take 1 tablet (40 mg total) by mouth once daily. (Patient taking differently: Take 40 mg by mouth every evening.), Disp: 90 tablet, Rfl: 3    triamcinolone acetonide 0.1% (KENALOG) 0.1 % cream, Apply topically 2 (two)  times daily., Disp: 15 g, Rfl: 1    valACYclovir (VALTREX) 1000 MG tablet, Take 1 tablet (1,000 mg total) by mouth once daily. 1/2 tablet daily (Patient taking differently: Take 500 mg by mouth once daily. 1/2 tablet daily), Disp: 45 tablet, Rfl: 3    vitamin D 1000 units Tab, Take 1,000 Units by mouth once daily., Disp: , Rfl:     b complex vitamins capsule, Take 1 capsule by mouth once daily., Disp: , Rfl:     famotidine (PEPCID) 20 MG tablet, Take 20 mg by mouth daily as needed for Heartburn., Disp: , Rfl:     fluocinonide (LIDEX) 0.05 % gel, SMARTSIG:sparingly Topical 2-4 Times Daily, Disp: , Rfl:     sod picosulf-mag ox-citric ac (CLENPIQ) 10 mg-3.5 gram -12 gram/160 mL Soln, once, Disp: , Rfl:     sucralfate (CARAFATE) 1 gram tablet, Take 1 tablet (1 g total) by mouth 4 (four) times daily with meals and nightly. (Patient not taking: Reported on 6/21/2023), Disp: 60 tablet, Rfl: 0    ALLERGIES     Review of patient's allergies indicates:   Allergen Reactions    Adhesive tape-silicones     Pcn [penicillins]          REVIEW OF SYSTEMS   Constitution: Negative. Negative for chills, fever and night sweats.   HENT: Negative for congestion and headaches.    Eyes: Negative for blurred vision, left vision loss and right vision loss.   Cardiovascular: Negative for chest pain and syncope.   Respiratory: Negative for cough and shortness of breath.    Endocrine: Negative for polydipsia, polyphagia and polyuria.   Hematologic/Lymphatic: Negative for bleeding problem. Does not bruise/bleed easily.   Skin: Negative for dry skin, itching and rash.   Musculoskeletal: Negative for falls. Positive for left shoulder pain  Gastrointestinal: Negative for abdominal pain and bowel incontinence.   Genitourinary: Negative for bladder incontinence and nocturia.   Neurological: Negative for disturbances in coordination, loss of balance and seizures.   Psychiatric/Behavioral: Negative for depression. The patient does not have insomnia.   "  Allergic/Immunologic: Negative for hives and persistent infections.     PHYSICAL EXAMINATION    Vitals: /69   Pulse 71   Ht 5' 1" (1.549 m)   Wt 75.5 kg (166 lb 8 oz)   BMI 31.46 kg/m²     General: The patient appears active and healthy with no apparent physical problems.  No disturbance of mood or affect is demonstrated. Alert and Oriented.    HEENT: Eyes normal, pupils equally round, nose normal.    Resp: Equal and symmetrical chest rises. No wheezing    CV: Regular rate    Neck: Supple; nonpainful range of motion.    Extremities: no cyanosis, clubbing, edema, or diffuse swelling.  Palpable pulses, good capillary refill of the digits.  No coolness, discoloration, edema or obvious varicosities.    Skin: no lesions noted.    Lymphatic: no detected adenopathy in the upper or lower extremities.    Neurologic: normal mental status, normal reflexes, normal gait and balance.  Patient is alert and oriented to person, place and time.  No flaccidity or spasticity is noted.  No motor or sensory deficits are noted.  Light touch is intact    Orthopaedic:     SHOULDER EXAM - LEFT    Inspection:   Normal skin color and appearance with no scars.  No muscle atrophy noted.  No scapular winging.      Palpation: No tenderness of the acromioclavicular joint, lateral edge of the acromion, biceps tendon, trapezius muscle or scapulothoracic bursa.      ROM:      PROM:     FE - 150°    Abd/ER -  90°  Abd/IR -  45°   Add/ER -  60°     AROM:    FE - 150°    Abd/ER -  90°  Abd/IR -  45°   Add/ER -  60°  Pain resisted ER       Tests:     - Segovia, - Neer's, - Cross Arm Adduction, - [place], - [place], - Speed. - Belly Press,  + Jobes, - Lift Off    Stability: - sulcus, - apprehension, - relocation, - load and shift, - DLS      Motor:  Rotator cuff strength is 5/5 supraspinatus, 5/5 infraspinatus, 5/5 subscapularis. Biceps, triceps and deltoid strength is 5/5.      Neuro     Distally there are no paresthesias, and sensation is " intact to light touch in the median, ulnar, and radial distributions.  Reflexes are 2/2 biceps, triceps and brachioradialis.        IMAGING    X-Ray Shoulder 2 or More Views Left  Narrative: EXAMINATION:  XR SHOULDER COMPLETE 2 OR MORE VIEWS LEFT    CLINICAL HISTORY:  Pain, unspecified    TECHNIQUE:  Two or three views of the left shoulder were performed.    COMPARISON:  None    FINDINGS:  Mild DJD.  No fracture or dislocation.  No bone destruction identified.  Slightly widened AC joint noted  Impression: See above    Electronically signed by: Florentino Dukes MD  Date:    06/21/2023  Time:    14:50        IMPRESSION       ICD-10-CM ICD-9-CM   1. Subacromial bursitis of left shoulder joint  M75.52 726.19   2. Pain  R52 780.96   3. Left shoulder pain, unspecified chronicity  M25.512 719.41       MEDICATIONS PRESCRIBED      None    RECOMMENDATIONS     CSI of left sub-acromial bursa performed ultrasound guidance today  Referral to physical therapy placed today  RTC in 6 weeks with Sukhwinder Hidalgo PA-C.  If no improvement we will order an MRI of the left shoulder.  I will see her back in clinic for the MRI results.    Large Joint Aspiration/Injection: L subacromial bursa    Date/Time: 6/21/2023 2:30 PM  Performed by: Alex Gusman MD  Authorized by: Alex Gusman MD     Consent Done?:  Yes (Verbal)  Indications:  Pain  Site marked: the procedure site was marked    Timeout: prior to procedure the correct patient, procedure, and site was verified    Prep: patient was prepped and draped in usual sterile fashion      Local anesthesia used?: Yes    Local anesthetic:  Co-phenylcaine spray (0.2% Naropin)  Anesthetic total (ml):  4      Details:  Needle Size:  22 G  Ultrasonic Guidance for needle placement?: Yes (Ultrasound guidance was used for needle localization.  Images were saved and stored for documentation.  Dynamic visualization of the needle was continuous throughout the procedure and maintained accurate placement)     Images are saved and documented.  Approach:  Posterior  Location:  Shoulder  Site:  L subacromial bursa  Medications:  40 mg methylPREDNISolone acetate 40 mg/mL  Medications comment:  5 mL LIDOcaine HCL 10 mg/ml (1%) 10 mg/mL (1 %)  Patient tolerance:  Patient tolerated the procedure well with no immediate complications        All questions were answered, pt will contact us for questions or concerns in the interim.

## 2023-06-30 ENCOUNTER — TELEPHONE (OUTPATIENT)
Dept: FAMILY MEDICINE | Facility: CLINIC | Age: 68
End: 2023-06-30
Payer: MEDICARE

## 2023-06-30 DIAGNOSIS — Z12.31 ENCOUNTER FOR SCREENING MAMMOGRAM FOR BREAST CANCER: Primary | ICD-10-CM

## 2023-07-07 ENCOUNTER — TELEPHONE (OUTPATIENT)
Dept: OTOLARYNGOLOGY | Facility: CLINIC | Age: 68
End: 2023-07-07
Payer: MEDICARE

## 2023-07-07 NOTE — TELEPHONE ENCOUNTER
----- Message from Radha Humphrey sent at 7/7/2023  8:16 AM CDT -----  Regarding: appt  Contact: @ [phone]  Pt requesting a appointment for the following Blocked Ear/Ear Fullness today no available dates  ...Please call and adv @ [phone]

## 2023-07-12 ENCOUNTER — TELEPHONE (OUTPATIENT)
Dept: FAMILY MEDICINE | Facility: CLINIC | Age: 68
End: 2023-07-12
Payer: MEDICARE

## 2023-07-12 DIAGNOSIS — Z78.0 ASYMPTOMATIC MENOPAUSAL STATE: ICD-10-CM

## 2023-07-12 DIAGNOSIS — Z12.31 ENCOUNTER FOR SCREENING MAMMOGRAM FOR BREAST CANCER: ICD-10-CM

## 2023-07-12 DIAGNOSIS — E78.5 HYPERLIPIDEMIA, UNSPECIFIED HYPERLIPIDEMIA TYPE: ICD-10-CM

## 2023-07-12 DIAGNOSIS — Z00.00 ROUTINE GENERAL MEDICAL EXAMINATION AT A HEALTH CARE FACILITY: Primary | ICD-10-CM

## 2023-07-12 NOTE — TELEPHONE ENCOUNTER
Returned patient's call. She requested her mammogram and annual labs prior to her appointment next week. Placed orders for mammogram, CMP, CBC, lipid. Do you want any other labs?

## 2023-07-12 NOTE — TELEPHONE ENCOUNTER
Orders Placed This Encounter   Procedures    Mammo Digital Screening Bilat w/ Ladarius    Comprehensive metabolic panel    CBC auto differential    LIPID PANEL    TSH

## 2023-07-12 NOTE — TELEPHONE ENCOUNTER
----- Message from Staci Mathias sent at 7/12/2023 10:43 AM CDT -----  Type:  Needs Medical Advice    Who Called: pt  Would the patient rather a call back or a response via [facility]? call  Best Call Back Number: [phone]  Additional Information: pt is requesting to speak with someone she has some questions and concerns

## 2023-07-13 ENCOUNTER — PATIENT MESSAGE (OUTPATIENT)
Dept: FAMILY MEDICINE | Facility: CLINIC | Age: 68
End: 2023-07-13
Payer: MEDICARE

## 2023-07-13 DIAGNOSIS — E78.5 HYPERLIPIDEMIA, UNSPECIFIED HYPERLIPIDEMIA TYPE: ICD-10-CM

## 2023-07-13 DIAGNOSIS — R73.09 ELEVATED HEMOGLOBIN A1C: Primary | ICD-10-CM

## 2023-07-13 DIAGNOSIS — Z00.00 ROUTINE GENERAL MEDICAL EXAMINATION AT A HEALTH CARE FACILITY: ICD-10-CM

## 2023-07-13 NOTE — TELEPHONE ENCOUNTER
Do you want to add A1c to labs?  
Ok added    Orders Placed This Encounter   Procedures    Hemoglobin A1C       
Patient

## 2023-07-14 ENCOUNTER — LAB VISIT (OUTPATIENT)
Dept: LAB | Facility: HOSPITAL | Age: 68
End: 2023-07-14
Attending: FAMILY MEDICINE
Payer: MEDICARE

## 2023-07-14 DIAGNOSIS — Z12.31 ENCOUNTER FOR SCREENING MAMMOGRAM FOR BREAST CANCER: ICD-10-CM

## 2023-07-14 DIAGNOSIS — Z00.00 ROUTINE GENERAL MEDICAL EXAMINATION AT A HEALTH CARE FACILITY: ICD-10-CM

## 2023-07-14 DIAGNOSIS — R73.09 ELEVATED HEMOGLOBIN A1C: ICD-10-CM

## 2023-07-14 DIAGNOSIS — E78.5 HYPERLIPIDEMIA, UNSPECIFIED HYPERLIPIDEMIA TYPE: ICD-10-CM

## 2023-07-14 DIAGNOSIS — Z78.0 ASYMPTOMATIC MENOPAUSAL STATE: ICD-10-CM

## 2023-07-14 LAB
ALBUMIN SERPL BCP-MCNC: 4 G/DL (ref 3.5–5.2)
ALP SERPL-CCNC: 98 U/L (ref 55–135)
ALT SERPL W/O P-5'-P-CCNC: 26 U/L (ref 10–44)
ANION GAP SERPL CALC-SCNC: 5 MMOL/L (ref 8–16)
AST SERPL-CCNC: 22 U/L (ref 10–40)
BASOPHILS # BLD AUTO: 0.02 K/UL (ref 0–0.2)
BASOPHILS NFR BLD: 0.3 % (ref 0–1.9)
BILIRUB SERPL-MCNC: 0.4 MG/DL (ref 0.1–1)
BUN SERPL-MCNC: 10 MG/DL (ref 8–23)
CALCIUM SERPL-MCNC: 10 MG/DL (ref 8.7–10.5)
CHLORIDE SERPL-SCNC: 106 MMOL/L (ref 95–110)
CHOLEST SERPL-MCNC: 189 MG/DL (ref 120–199)
CHOLEST/HDLC SERPL: 2.9 {RATIO} (ref 2–5)
CO2 SERPL-SCNC: 28 MMOL/L (ref 23–29)
CREAT SERPL-MCNC: 0.8 MG/DL (ref 0.5–1.4)
DIFFERENTIAL METHOD: ABNORMAL
EOSINOPHIL # BLD AUTO: 0.1 K/UL (ref 0–0.5)
EOSINOPHIL NFR BLD: 1.5 % (ref 0–8)
ERYTHROCYTE [DISTWIDTH] IN BLOOD BY AUTOMATED COUNT: 12.9 % (ref 11.5–14.5)
EST. GFR  (NO RACE VARIABLE): >60 ML/MIN/1.73 M^2
ESTIMATED AVG GLUCOSE: 117 MG/DL (ref 68–131)
GLUCOSE SERPL-MCNC: 100 MG/DL (ref 70–110)
HBA1C MFR BLD: 5.7 % (ref 4–5.6)
HCT VFR BLD AUTO: 42 % (ref 37–48.5)
HDLC SERPL-MCNC: 65 MG/DL (ref 40–75)
HDLC SERPL: 34.4 % (ref 20–50)
HGB BLD-MCNC: 13.7 G/DL (ref 12–16)
IMM GRANULOCYTES # BLD AUTO: 0.01 K/UL (ref 0–0.04)
IMM GRANULOCYTES NFR BLD AUTO: 0.2 % (ref 0–0.5)
LDLC SERPL CALC-MCNC: 113.6 MG/DL (ref 63–159)
LYMPHOCYTES # BLD AUTO: 3 K/UL (ref 1–4.8)
LYMPHOCYTES NFR BLD: 48.9 % (ref 18–48)
MCH RBC QN AUTO: 30.3 PG (ref 27–31)
MCHC RBC AUTO-ENTMCNC: 32.6 G/DL (ref 32–36)
MCV RBC AUTO: 93 FL (ref 82–98)
MONOCYTES # BLD AUTO: 0.5 K/UL (ref 0.3–1)
MONOCYTES NFR BLD: 8.3 % (ref 4–15)
NEUTROPHILS # BLD AUTO: 2.5 K/UL (ref 1.8–7.7)
NEUTROPHILS NFR BLD: 40.8 % (ref 38–73)
NONHDLC SERPL-MCNC: 124 MG/DL
NRBC BLD-RTO: 0 /100 WBC
PLATELET # BLD AUTO: 255 K/UL (ref 150–450)
PMV BLD AUTO: 10.5 FL (ref 9.2–12.9)
POTASSIUM SERPL-SCNC: 4.4 MMOL/L (ref 3.5–5.1)
PROT SERPL-MCNC: 7.7 G/DL (ref 6–8.4)
RBC # BLD AUTO: 4.52 M/UL (ref 4–5.4)
SODIUM SERPL-SCNC: 139 MMOL/L (ref 136–145)
TRIGL SERPL-MCNC: 52 MG/DL (ref 30–150)
TSH SERPL DL<=0.005 MIU/L-ACNC: 1.89 UIU/ML (ref 0.4–4)
WBC # BLD AUTO: 6.03 K/UL (ref 3.9–12.7)

## 2023-07-14 PROCEDURE — 84443 ASSAY THYROID STIM HORMONE: CPT | Performed by: FAMILY MEDICINE

## 2023-07-14 PROCEDURE — 36415 COLL VENOUS BLD VENIPUNCTURE: CPT | Performed by: FAMILY MEDICINE

## 2023-07-14 PROCEDURE — 85025 COMPLETE CBC W/AUTO DIFF WBC: CPT | Performed by: FAMILY MEDICINE

## 2023-07-14 PROCEDURE — 80053 COMPREHEN METABOLIC PANEL: CPT | Performed by: FAMILY MEDICINE

## 2023-07-14 PROCEDURE — 83036 HEMOGLOBIN GLYCOSYLATED A1C: CPT | Performed by: FAMILY MEDICINE

## 2023-07-14 PROCEDURE — 80061 LIPID PANEL: CPT | Performed by: FAMILY MEDICINE

## 2023-07-18 ENCOUNTER — OFFICE VISIT (OUTPATIENT)
Dept: FAMILY MEDICINE | Facility: CLINIC | Age: 68
End: 2023-07-18
Payer: MEDICARE

## 2023-07-18 VITALS
DIASTOLIC BLOOD PRESSURE: 78 MMHG | SYSTOLIC BLOOD PRESSURE: 132 MMHG | OXYGEN SATURATION: 96 % | HEART RATE: 71 BPM | BODY MASS INDEX: 31.32 KG/M2 | WEIGHT: 165.81 LBS

## 2023-07-18 DIAGNOSIS — R73.09 ELEVATED HEMOGLOBIN A1C: ICD-10-CM

## 2023-07-18 DIAGNOSIS — Z00.00 ROUTINE GENERAL MEDICAL EXAMINATION AT A HEALTH CARE FACILITY: Primary | ICD-10-CM

## 2023-07-18 DIAGNOSIS — E78.5 HYPERLIPIDEMIA, UNSPECIFIED HYPERLIPIDEMIA TYPE: ICD-10-CM

## 2023-07-18 DIAGNOSIS — K21.9 GASTROESOPHAGEAL REFLUX DISEASE, UNSPECIFIED WHETHER ESOPHAGITIS PRESENT: ICD-10-CM

## 2023-07-18 PROCEDURE — 1159F PR MEDICATION LIST DOCUMENTED IN MEDICAL RECORD: ICD-10-PCS | Mod: CPTII,S$GLB,, | Performed by: FAMILY MEDICINE

## 2023-07-18 PROCEDURE — 3288F PR FALLS RISK ASSESSMENT DOCUMENTED: ICD-10-PCS | Mod: CPTII,S$GLB,, | Performed by: FAMILY MEDICINE

## 2023-07-18 PROCEDURE — 3044F HG A1C LEVEL LT 7.0%: CPT | Mod: CPTII,S$GLB,, | Performed by: FAMILY MEDICINE

## 2023-07-18 PROCEDURE — 3078F PR MOST RECENT DIASTOLIC BLOOD PRESSURE < 80 MM HG: ICD-10-PCS | Mod: CPTII,S$GLB,, | Performed by: FAMILY MEDICINE

## 2023-07-18 PROCEDURE — 99397 PR PREVENTIVE VISIT,EST,65 & OVER: ICD-10-PCS | Mod: GZ,S$GLB,, | Performed by: FAMILY MEDICINE

## 2023-07-18 PROCEDURE — 1126F AMNT PAIN NOTED NONE PRSNT: CPT | Mod: CPTII,S$GLB,, | Performed by: FAMILY MEDICINE

## 2023-07-18 PROCEDURE — 1159F MED LIST DOCD IN RCRD: CPT | Mod: CPTII,S$GLB,, | Performed by: FAMILY MEDICINE

## 2023-07-18 PROCEDURE — 3078F DIAST BP <80 MM HG: CPT | Mod: CPTII,S$GLB,, | Performed by: FAMILY MEDICINE

## 2023-07-18 PROCEDURE — 1126F PR PAIN SEVERITY QUANTIFIED, NO PAIN PRESENT: ICD-10-PCS | Mod: CPTII,S$GLB,, | Performed by: FAMILY MEDICINE

## 2023-07-18 PROCEDURE — 3008F PR BODY MASS INDEX (BMI) DOCUMENTED: ICD-10-PCS | Mod: CPTII,S$GLB,, | Performed by: FAMILY MEDICINE

## 2023-07-18 PROCEDURE — 3008F BODY MASS INDEX DOCD: CPT | Mod: CPTII,S$GLB,, | Performed by: FAMILY MEDICINE

## 2023-07-18 PROCEDURE — 99999 PR PBB SHADOW E&M-EST. PATIENT-LVL III: ICD-10-PCS | Mod: PBBFAC,,, | Performed by: FAMILY MEDICINE

## 2023-07-18 PROCEDURE — 3288F FALL RISK ASSESSMENT DOCD: CPT | Mod: CPTII,S$GLB,, | Performed by: FAMILY MEDICINE

## 2023-07-18 PROCEDURE — 3044F PR MOST RECENT HEMOGLOBIN A1C LEVEL <7.0%: ICD-10-PCS | Mod: CPTII,S$GLB,, | Performed by: FAMILY MEDICINE

## 2023-07-18 PROCEDURE — 1101F PT FALLS ASSESS-DOCD LE1/YR: CPT | Mod: CPTII,S$GLB,, | Performed by: FAMILY MEDICINE

## 2023-07-18 PROCEDURE — 99999 PR PBB SHADOW E&M-EST. PATIENT-LVL III: CPT | Mod: PBBFAC,,, | Performed by: FAMILY MEDICINE

## 2023-07-18 PROCEDURE — 1101F PR PT FALLS ASSESS DOC 0-1 FALLS W/OUT INJ PAST YR: ICD-10-PCS | Mod: CPTII,S$GLB,, | Performed by: FAMILY MEDICINE

## 2023-07-18 PROCEDURE — 3075F SYST BP GE 130 - 139MM HG: CPT | Mod: CPTII,S$GLB,, | Performed by: FAMILY MEDICINE

## 2023-07-18 PROCEDURE — 3075F PR MOST RECENT SYSTOLIC BLOOD PRESS GE 130-139MM HG: ICD-10-PCS | Mod: CPTII,S$GLB,, | Performed by: FAMILY MEDICINE

## 2023-07-18 PROCEDURE — 99397 PER PM REEVAL EST PAT 65+ YR: CPT | Mod: GZ,S$GLB,, | Performed by: FAMILY MEDICINE

## 2023-07-18 NOTE — LETTER
[facility]   [address], [place] [address]   Phone [phone]   Fax [phone]                         July 18, 2023    Rachaelgina June  [address] [address]      [facility]  [address]  [place] [address]  Phone: [phone]  Fax: [phone] Rachael June    Was treated here on 07/18/2023    May Return to work/school on 7/18/2023    No Restrictions        Sincerely,        Froylan Eckert MD

## 2023-07-18 NOTE — PROGRESS NOTES
(Portions of this note were dictated using voice recognition software and may contain dictation related errors in spelling/grammar/syntax not found on text review)    CC:   Chief Complaint   Patient presents with    Annual Exam       HPI: 68 y.o. female      HLD on pravastatin 40 mg     GERD: famotidine 20 mg daily     PreDM:  last a1c 5.7    Genital HSV: daily suppressive valtrex    AR: on flonase    Intermittent constipation takes miralax    Peripheral edema right leg/foot past few years, have had several investigations over past no prominent findings. Was tried on diuretics but not really helpful. Better in am and more prominent through day. No pain. Finds that she is sensitive to sodium and fluid retention.     KEMAL mild: PSG 2018, doesn't use CPAP     Hx of insulinoma 2008, removed and no specific issues since.     Transiently elevated blood pressure at home, typically controlled though.  Recommended strict attention to sodium, dietary factors, continued monitoring.          No tob or ETOH  Works for [employer] as   Takes care of granddtr    Diet: +indiscretions, +sweets, fast food 1-2 x monthly  Exercise: joined gym , exercises 3-4 times weekly    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      Past Medical History:   Diagnosis Date    Abnormal Pap smear of cervix     Colon polyp     Condition not found     h/o abnl vaginal cuff biopsy-mild dysplasia    Dysplasia of vagina     vaginal cuff biopsy    Genital herpes, unspecified     GERD (gastroesophageal reflux disease)     Hyperlipidemia     Insulinoma     s/p resection    Special screening for malignant neoplasms, colon 7/6/2015       Past Surgical History:   Procedure Laterality Date    COLONOSCOPY N/A 6/8/2017    Procedure: COLONOSCOPY;  Surgeon: Chadd Davis MD;  Location: [address]);  Service: Endoscopy;  Laterality: N/A;    COLONOSCOPY W/ POLYPECTOMY      HYSTERECTOMY      fibroids    insulinoma resection      SHOULDER ARTHROSCOPY Left 9/23/2015      Jag        Family History   Problem Relation Age of Onset    Heart disease Mother     Hypertension Mother     Diabetes Mother     Hyperlipidemia Mother     Stroke Father     Diabetes Father     Heart disease Father     Cancer Sister         breast cancer    Diabetes Sister     Breast cancer Sister     Cancer Maternal Aunt         ovarian cancer    Diabetes Brother     Diabetes Sister     Heart disease Brother        Social History     Tobacco Use    Smoking status: Never    Smokeless tobacco: Never   Substance Use Topics    Alcohol use: No    Drug use: No       Lab Results   Component Value Date    WBC 6.03 07/14/2023    HGB 13.7 07/14/2023    HCT 42.0 07/14/2023    MCV 93 07/14/2023     07/14/2023    CHOL 189 07/14/2023    TRIG 52 07/14/2023    HDL 65 07/14/2023    ALT 26 07/14/2023    AST 22 07/14/2023    BILITOT 0.4 07/14/2023    ALKPHOS 98 07/14/2023     07/14/2023    K 4.4 07/14/2023     07/14/2023    CREATININE 0.8 07/14/2023    ESTGFRAFRICA >60.0 07/15/2022    EGFRNONAA >60.0 07/15/2022    CALCIUM 10.0 07/14/2023    ALBUMIN 4.0 07/14/2023    BUN 10 07/14/2023    CO2 28 07/14/2023    TSH 1.893 07/14/2023    INR 1.0 01/15/2015    HGBA1C 5.7 (H) 07/14/2023    MICALBCREAT 10.0 10/29/2008    LDLCALC 113.6 07/14/2023     07/14/2023    JKHESJIK34FF 35 06/06/2017       Lab Results   Component Value Date    HGBA1C 5.7 (H) 07/14/2023    HGBA1C 5.9 (H) 07/15/2022    HGBA1C 5.9 06/06/2017    HGBA1C 5.7 12/02/2016    HGBA1C 5.9 06/02/2015             Vital signs reviewed  Vitals:    07/18/23 1544   BP: 132/78   BP Location: Left arm   Patient Position: Sitting   BP Method: Medium (Manual)   Pulse: 71   SpO2: 96%   Weight: 75.2 kg (165 lb 12.6 oz)         Wt Readings from Last 4 Encounters:   07/18/23 75.2 kg (165 lb 12.6 oz)   06/21/23 75.5 kg (166 lb 8 oz)   05/08/23 74.5 kg (164 lb 3.9 oz)   12/06/22 73.5 kg (162 lb 0.6 oz)       PE:   APPEARANCE: Well nourished, well developed, in no  acute distress.    HEAD: Normocephalic, atraumatic.  EYES: PERRL. EOMI.   Conjunctivae noninjected.  EARS: TM's intact. Cerumen partially obscuring but not impacted bilat.  NOSE: Mucosa pink. Airway clear.  MOUTH & THROAT: No tonsillar enlargement. No pharyngeal erythema or exudate.   NECK: Supple with no cervical lymphadenopathy.  No carotid bruits, no thyromegaly  CHEST: Good inspiratory effort. Lungs clear to auscultation with no wheezes or crackles.  CARDIOVASCULAR: Normal S1, S2. No rubs, murmurs, or gallops.  ABDOMEN: Bowel sounds normal. Not distended. Soft. No tenderness or masses. No organomegaly.  EXTREMITIES:  no edema      IMPRESSION  1. Routine general medical examination at a health care facility    2. Hyperlipidemia, unspecified hyperlipidemia type    3. Elevated hemoglobin A1c    4. Gastroesophageal reflux disease, unspecified whether esophagitis present              PLAN  Labs stable    Monitor/reduce sodium    Continue regular exercise    Can do otc debrox for next week. Has appt with ent sched next week.     GERD stable on famotidine      Return 1 year or sooner as needed          SCREENINGS      Immunizations:   covid bivalent utd  tdap 2016  PCV20 7/2022  Zoster, can get at pharmacy    Age/demographic appropriate health maintenance:  MMG   scheduled  DEXA 2022 normal.    colonoscopy 2017, internal hemorrhoids otherwise normal. Rpt April 2022 (2 polyps removed.--[facility])

## 2023-07-19 ENCOUNTER — PATIENT OUTREACH (OUTPATIENT)
Dept: ADMINISTRATIVE | Facility: HOSPITAL | Age: 68
End: 2023-07-19
Payer: MEDICARE

## 2023-07-19 NOTE — PROGRESS NOTES
Care Everywhere updates requested and reviewed.  Immunizations reconciled. Media reports reviewed.  Duplicate HM overrides and  orders removed.  Overdue HM topic chart audit and/or requested.  Overdue lab testing linked to upcoming lab appointments if applies.    Requested COLON  records [facility]    Health Maintenance Due   Topic Date Due    Shingles Vaccine (1 of 2) Never done    Colorectal Cancer Screening  2022    COVID-19 Vaccine (6 - Moderna series) 2023    Mammogram  2023

## 2023-07-19 NOTE — LETTER
AUTHORIZATION FOR RELEASE OF   CONFIDENTIAL INFORMATION    NADEEM RASHID,    We are seeing Rachael June, date of birth 1955, in the clinic at [facility]. Froylan Eckert MD is the patient's PCP. Rachael June has an outstanding lab/procedure at the time we reviewed her chart. In order to help keep her health information updated, she has authorized us to request the following medical record(s):                                              ( X )  COLONOSCOPY            Please fax records to [facility], Mehul S Sheth, MD, [phone]     If you have any questions, please contact Sandra Care Coordinator, at [phone].           Patient Name: Rachael June  : 1955  Patient Phone #: [phone]

## 2023-07-24 DIAGNOSIS — A60.00 GENITAL HERPES SIMPLEX, UNSPECIFIED SITE: ICD-10-CM

## 2023-07-24 DIAGNOSIS — E78.5 HYPERLIPIDEMIA: ICD-10-CM

## 2023-07-24 RX ORDER — PRAVASTATIN SODIUM 40 MG/1
40 TABLET ORAL DAILY
Qty: 90 TABLET | Refills: 3 | Status: SHIPPED | OUTPATIENT
Start: 2023-07-24

## 2023-07-24 RX ORDER — VALACYCLOVIR HYDROCHLORIDE 1 G/1
1000 TABLET, FILM COATED ORAL DAILY
Qty: 45 TABLET | Refills: 3 | Status: SHIPPED | OUTPATIENT
Start: 2023-07-24

## 2023-07-24 NOTE — TELEPHONE ENCOUNTER
----- Message from Rachel Strauss sent at 7/24/2023  1:02 PM CDT -----  Regarding: Refills  Contact: [phone]  Type:  RX Refill Request    1.  Who Called: PT   Refill or New Rx: Refills   RX Name and Strength: pravastatin (PRAVACHOL) 40 MG tablet 90 tablet   How is the patient currently taking it? (ex. 1XDay): 1 x a day   Is this a 30 day or 90 day RX: 90  Preferred Pharmacy with phone number: [id number] [address] Phone:  [phone] Fax:  [phone]    2. valACYclovir (VALTREX) 1000 MG tablet 45 tablet

## 2023-07-24 NOTE — TELEPHONE ENCOUNTER
No care due was identified.  Health [facility] Embedded Care Due Messages. Reference number: [id number].   7/24/2023 1:32:24 PM CDT

## 2023-07-25 ENCOUNTER — CLINICAL SUPPORT (OUTPATIENT)
Dept: AUDIOLOGY | Facility: CLINIC | Age: 68
End: 2023-07-25
Payer: MEDICARE

## 2023-07-25 ENCOUNTER — OFFICE VISIT (OUTPATIENT)
Dept: OTOLARYNGOLOGY | Facility: CLINIC | Age: 68
End: 2023-07-25
Payer: MEDICARE

## 2023-07-25 DIAGNOSIS — H60.23 CHRONIC MALIGNANT OTITIS EXTERNA OF BOTH EARS: ICD-10-CM

## 2023-07-25 DIAGNOSIS — L29.9 ITCHING OF EAR: Primary | ICD-10-CM

## 2023-07-25 DIAGNOSIS — H92.02 OTALGIA OF LEFT EAR: ICD-10-CM

## 2023-07-25 DIAGNOSIS — H90.3 SENSORINEURAL HEARING LOSS (SNHL) OF BOTH EARS: ICD-10-CM

## 2023-07-25 DIAGNOSIS — H91.93 BILATERAL HIGH FREQUENCY HEARING LOSS: Primary | ICD-10-CM

## 2023-07-25 DIAGNOSIS — H61.23 EXCESSIVE CERUMEN IN EAR CANAL, BILATERAL: ICD-10-CM

## 2023-07-25 PROCEDURE — 1159F PR MEDICATION LIST DOCUMENTED IN MEDICAL RECORD: ICD-10-PCS | Mod: CPTII,S$GLB,,

## 2023-07-25 PROCEDURE — 1160F RVW MEDS BY RX/DR IN RCRD: CPT | Mod: CPTII,S$GLB,,

## 2023-07-25 PROCEDURE — 92557 PR COMPREHENSIVE HEARING TEST: ICD-10-PCS | Mod: S$GLB,,, | Performed by: AUDIOLOGIST

## 2023-07-25 PROCEDURE — 1125F PR PAIN SEVERITY QUANTIFIED, PAIN PRESENT: ICD-10-PCS | Mod: CPTII,S$GLB,,

## 2023-07-25 PROCEDURE — 3288F PR FALLS RISK ASSESSMENT DOCUMENTED: ICD-10-PCS | Mod: CPTII,S$GLB,,

## 2023-07-25 PROCEDURE — 99203 PR OFFICE/OUTPT VISIT, NEW, LEVL III, 30-44 MIN: ICD-10-PCS | Mod: 25,S$GLB,,

## 2023-07-25 PROCEDURE — 99999 PR PBB SHADOW E&M-EST. PATIENT-LVL III: CPT | Mod: PBBFAC,,,

## 2023-07-25 PROCEDURE — 1159F MED LIST DOCD IN RCRD: CPT | Mod: CPTII,S$GLB,,

## 2023-07-25 PROCEDURE — 92504 EAR MICROSCOPY EXAMINATION: CPT | Mod: S$GLB,,,

## 2023-07-25 PROCEDURE — 1160F PR REVIEW ALL MEDS BY PRESCRIBER/CLIN PHARMACIST DOCUMENTED: ICD-10-PCS | Mod: CPTII,S$GLB,,

## 2023-07-25 PROCEDURE — 99203 OFFICE O/P NEW LOW 30 MIN: CPT | Mod: 25,S$GLB,,

## 2023-07-25 PROCEDURE — 3044F HG A1C LEVEL LT 7.0%: CPT | Mod: CPTII,S$GLB,,

## 2023-07-25 PROCEDURE — 3044F PR MOST RECENT HEMOGLOBIN A1C LEVEL <7.0%: ICD-10-PCS | Mod: CPTII,S$GLB,,

## 2023-07-25 PROCEDURE — 99999 PR PBB SHADOW E&M-EST. PATIENT-LVL III: ICD-10-PCS | Mod: PBBFAC,,,

## 2023-07-25 PROCEDURE — 92557 COMPREHENSIVE HEARING TEST: CPT | Mod: S$GLB,,, | Performed by: AUDIOLOGIST

## 2023-07-25 PROCEDURE — 3288F FALL RISK ASSESSMENT DOCD: CPT | Mod: CPTII,S$GLB,,

## 2023-07-25 PROCEDURE — 1125F AMNT PAIN NOTED PAIN PRSNT: CPT | Mod: CPTII,S$GLB,,

## 2023-07-25 PROCEDURE — 1101F PT FALLS ASSESS-DOCD LE1/YR: CPT | Mod: CPTII,S$GLB,,

## 2023-07-25 PROCEDURE — 92567 PR TYMPA2METRY: ICD-10-PCS | Mod: S$GLB,,, | Performed by: AUDIOLOGIST

## 2023-07-25 PROCEDURE — 92567 TYMPANOMETRY: CPT | Mod: S$GLB,,, | Performed by: AUDIOLOGIST

## 2023-07-25 PROCEDURE — 1101F PR PT FALLS ASSESS DOC 0-1 FALLS W/OUT INJ PAST YR: ICD-10-PCS | Mod: CPTII,S$GLB,,

## 2023-07-25 PROCEDURE — 92504 PR EAR MICROSCOPY EXAMINATION: ICD-10-PCS | Mod: S$GLB,,,

## 2023-07-25 RX ORDER — MODERNA COVID-19 VACCINE, BIVALENT 25; 25 UG/.5ML; UG/.5ML
INJECTION, SUSPENSION INTRAMUSCULAR
COMMUNITY
Start: 2023-04-11 | End: 2024-02-20

## 2023-07-25 RX ORDER — BETAMETHASONE VALERATE 0.1 %
LOTION (ML) TOPICAL 2 TIMES DAILY
Qty: 60 ML | Refills: 0 | Status: SHIPPED | OUTPATIENT
Start: 2023-07-25 | End: 2023-08-08

## 2023-07-25 NOTE — PROGRESS NOTES
Ms. Rachael June was seen in the clinic today for an audiological evaluation.  Ms. June reported otalgia of the left ear.    Audiological testing revealed normal hearing sloping to a mild to moderate high frequency hearing loss for the right ear and normal hearing sloping to a mild to moderately-severe high frequency hearing loss for the left ear.  A speech reception threshold was obtained at 10 dBHL for the right ear and at 0 dBHL for the left ear.  Speech discrimination was 96% for the right ear and 100% for the left ear.      Tympanometry testing revealed a Type A tympanogram for the right ear and a Type A tympanogram for the left ear.      Recommendations:  1. Otologic evaluation  2. Annual audiological evaluation  3. Hearing protection when in noise

## 2023-07-25 NOTE — PROGRESS NOTES
Subjective:   Rachael June is a 68 y.o. female who presents for a hearing evaluation and left ear pain. She reports a gradual decrease in her hearing, bilateral ears.  She reports having difficulty hearing speech when individuals are not facing her or at a distance. She reports dryness in right ear lara bow, and left ear otalgia. She reports she uses her small finger to scratch it provide comfort. She reports  bilateral ear canal itching and has tried Vaseline and baby oil without much relief/improvement. She denies any drainage, tinnitus, or dizziness. She denies using Q tips.   There is not a family history of hearing loss at a young age. There is not a prior history of ear surgery. There is not a prior history of ear infections. There is not a history of ear trauma. She denies a history of significant noise exposure.     Past Medical History  She has a past medical history of Abnormal Pap smear of cervix, Colon polyp, Condition not found, Dysplasia of vagina, Genital herpes, unspecified, GERD (gastroesophageal reflux disease), Hyperlipidemia, Insulinoma, and Special screening for malignant neoplasms, colon.    Past Surgical History  She has a past surgical history that includes insulinoma resection; Hysterectomy; Shoulder arthroscopy (Left, 9/23/2015); Colonoscopy w/ polypectomy; and Colonoscopy (N/A, 6/8/2017).    Family History  Her family history includes Breast cancer in her sister; Cancer in her maternal aunt and sister; Diabetes in her brother, father, mother, sister, and sister; Heart disease in her brother, father, and mother; Hyperlipidemia in her mother; Hypertension in her mother; Stroke in her father.    Social History  She reports that she has never smoked. She has never used smokeless tobacco. She reports that she does not drink alcohol and does not use drugs.    Allergies  She is allergic to adhesive tape-silicones and pcn [penicillins].    Medications  She has a current medication list  which includes the following prescription(s): aspirin, biotin, calcium carbonate, famotidine, fish oil-omega-3 fatty acids, fluocinonide, fluticasone propionate, bri root, ibuprofen, moderna covid bival(6m up)(pf), multivitamin, pantoprazole, pravastatin, valacyclovir, vitamin d, and betamethasone valerate 0.1%.  Review of Systems   HENT: Positive for ear pain and hearing loss.  Negative for ear discharge, ear infection, postnasal drip, ringing in the ears, runny nose, sinus infection, sinus pressure, sore throat and stuffy nose.      Respiratory:  Negative for shortness of breath.      Neurological: Negative for dizziness, headaches and light-headedness.        Objective:     Constitutional:   She is oriented to person, place, and time. She appears well-developed and well-nourished. She appears alert. She is cooperative.  Non-toxic appearance. She does not have a sickly appearance. She does not appear ill. Normal speech.      Head:  Normocephalic and atraumatic. Head is without right periorbital erythema, without left periorbital erythema and without TMJ tenderness. Salivary glands normal.  Facial strength is normal.      Ears:    Right Ear: No lacerations. No drainage, swelling or tenderness. No foreign bodies. No mastoid tenderness. Tympanic membrane is not injected, not scarred, not perforated, not erythematous, not retracted and not bulging. Tympanic membrane mobility is normal. No middle ear effusion. No PE tube. No hemotympanum. Decreased hearing is noted.   Left Ear: No lacerations. No drainage, swelling or tenderness. No foreign bodies. No mastoid tenderness. Tympanic membrane is not injected, not scarred, not perforated, not erythematous, not retracted and not bulging. Tympanic membrane mobility is normal.  No middle ear effusion.  No PE tube. No hemotympanum. Decreased hearing is noted.   Ears:    Bilateral TMs clear and translucent  without any fluid, purulence, retraction, perforation under  microscopy and positive TM movement with pneumatic otoscopy.   Bilateral EACs with mild redness/skin flakyiness in proximal  ear canals; no drainage or swelling noted.      Nose:  No mucosal edema, rhinorrhea or sinus tenderness. No epistaxis. Turbinates normal.  Right sinus exhibits no maxillary sinus tenderness and no frontal sinus tenderness. Left sinus exhibits no maxillary sinus tenderness and no frontal sinus tenderness.     Mouth/Throat  Oropharynx clear and moist without lesions or asymmetry and normal uvula midline. Normal dentition. No uvula swelling, oral lesions, trismus or mucous membrane lesions. No oropharyngeal exudate, posterior oropharyngeal edema or posterior oropharyngeal erythema.     Neck:  Trachea normal, phonation normal and no adenopathy. Thyroid tenderness is present. No edema, no erythema and no neck rigidity present. No thyroid mass and no thyromegaly present.     She has no cervical adenopathy.     Pulmonary/Chest:   Effort normal.     Psychiatric:   She has a normal mood and affect. Her speech is normal and behavior is normal.     Neurological:   She is alert and oriented to person, place, and time. She has neurological normal, alert and oriented.       Audiogram      I independently reviewed the tracings of the complete audiometric evaluation performed today. I reviewed the audiogram with the patient as well. Pertinent findings include : Audiological testing revealed normal hearing sloping to a mild to moderate high frequency hearing loss for the right ear and normal hearing sloping to a mild to moderately-severe high frequency hearing loss for the left ear.  A speech reception threshold was obtained at 10 dBHL for the right ear and at 0 dBHL for the left ear.  Speech discrimination was 96% for the right ear and 100% for the left ear.       Tympanometry testing revealed a Type A tympanogram for the right ear and a Type A tympanogram for the left ear.    Assessment:     1. Itching of  ear    2. Sensorineural hearing loss (SNHL) of both ears    3. Otalgia of left ear    4. Excessive cerumen in ear canal, bilateral    5. Chronic malignant otitis externa of both ears      Plan:   Rachael was seen today for hearing loss and otalgia.    Diagnoses and all orders for this visit:    Itching of ear  -     betamethasone valerate 0.1% (VALISONE) 0.1 % Lotn; Apply topically 2 (two) times daily. 3 drops AU Bid. for 14 days  Cerave on outer part of ear canal lara bowl.  Sensorineural hearing loss (SNHL) of both ears  Audiometric testing interpretation consistent with sensorineural hearing loss.  Discussed the etiology of SNHL.  Hearing conservation in noisy environments. Return to clinic every year for audiometric testing.   Otalgia of left ear  -     betamethasone valerate 0.1% (VALISONE) 0.1 % Lotn; Apply topically 2 (two) times daily. 3 drops AU Bid. for 14 days    Excessive cerumen in bilateral ear canals    Chronic otitis externa both ears   Valisone bilateral ear canals.   - dry ear precautions  - no instrumentation, fingernails, or Q-tips in ear canals    RTC as needed or if symptoms persist.  Questions answered.

## 2023-08-03 ENCOUNTER — PATIENT OUTREACH (OUTPATIENT)
Dept: ADMINISTRATIVE | Facility: HOSPITAL | Age: 68
End: 2023-08-03
Payer: MEDICARE

## 2023-08-03 NOTE — PROGRESS NOTES
Care Everywhere updates requested and reviewed.  Immunizations reconciled. Media reports reviewed.  Duplicate HM overrides and  orders removed.  Overdue HM topic chart audit and/or requested.  Overdue lab testing linked to upcoming lab appointments if applies.      HM updated with external COLON report.       Health Maintenance Due   Topic Date Due    Shingles Vaccine (1 of 2) Never done    Colorectal Cancer Screening  2022    COVID-19 Vaccine (6 - Moderna series) 2023    Mammogram  2023

## 2023-08-25 ENCOUNTER — HOSPITAL ENCOUNTER (OUTPATIENT)
Dept: RADIOLOGY | Facility: HOSPITAL | Age: 68
Discharge: HOME | End: 2023-08-25
Attending: FAMILY MEDICINE
Payer: MEDICARE

## 2023-08-25 DIAGNOSIS — Z12.31 ENCOUNTER FOR SCREENING MAMMOGRAM FOR BREAST CANCER: ICD-10-CM

## 2023-08-25 PROCEDURE — 77063 BREAST TOMOSYNTHESIS BI: CPT | Mod: 26,,, | Performed by: RADIOLOGY

## 2023-08-25 PROCEDURE — 77067 SCR MAMMO BI INCL CAD: CPT | Mod: TC

## 2023-08-25 PROCEDURE — 77067 MAMMO DIGITAL SCREENING BILAT WITH TOMO: ICD-10-PCS | Mod: 26,,, | Performed by: RADIOLOGY

## 2023-08-25 PROCEDURE — 77067 SCR MAMMO BI INCL CAD: CPT | Mod: 26,,, | Performed by: RADIOLOGY

## 2023-08-25 PROCEDURE — 77063 MAMMO DIGITAL SCREENING BILAT WITH TOMO: ICD-10-PCS | Mod: 26,,, | Performed by: RADIOLOGY

## 2023-08-30 ENCOUNTER — OFFICE VISIT (OUTPATIENT)
Dept: OBSTETRICS AND GYNECOLOGY | Facility: CLINIC | Age: 68
End: 2023-08-30
Payer: MEDICARE

## 2023-08-30 VITALS
BODY MASS INDEX: 32.28 KG/M2 | WEIGHT: 170.88 LBS | DIASTOLIC BLOOD PRESSURE: 72 MMHG | SYSTOLIC BLOOD PRESSURE: 164 MMHG

## 2023-08-30 DIAGNOSIS — Z01.419 ROUTINE GYNECOLOGICAL EXAMINATION: Primary | ICD-10-CM

## 2023-08-30 PROCEDURE — 99999 PR PBB SHADOW E&M-EST. PATIENT-LVL III: CPT | Mod: PBBFAC,,, | Performed by: OBSTETRICS & GYNECOLOGY

## 2023-08-30 PROCEDURE — 1159F MED LIST DOCD IN RCRD: CPT | Mod: CPTII,S$GLB,, | Performed by: OBSTETRICS & GYNECOLOGY

## 2023-08-30 PROCEDURE — 3044F HG A1C LEVEL LT 7.0%: CPT | Mod: CPTII,S$GLB,, | Performed by: OBSTETRICS & GYNECOLOGY

## 2023-08-30 PROCEDURE — 3008F PR BODY MASS INDEX (BMI) DOCUMENTED: ICD-10-PCS | Mod: CPTII,S$GLB,, | Performed by: OBSTETRICS & GYNECOLOGY

## 2023-08-30 PROCEDURE — G0101 PR CA SCREEN;PELVIC/BREAST EXAM: ICD-10-PCS | Mod: GZ,S$GLB,, | Performed by: OBSTETRICS & GYNECOLOGY

## 2023-08-30 PROCEDURE — 3008F BODY MASS INDEX DOCD: CPT | Mod: CPTII,S$GLB,, | Performed by: OBSTETRICS & GYNECOLOGY

## 2023-08-30 PROCEDURE — 3044F PR MOST RECENT HEMOGLOBIN A1C LEVEL <7.0%: ICD-10-PCS | Mod: CPTII,S$GLB,, | Performed by: OBSTETRICS & GYNECOLOGY

## 2023-08-30 PROCEDURE — G0101 CA SCREEN;PELVIC/BREAST EXAM: HCPCS | Mod: GZ,S$GLB,, | Performed by: OBSTETRICS & GYNECOLOGY

## 2023-08-30 PROCEDURE — 3078F PR MOST RECENT DIASTOLIC BLOOD PRESSURE < 80 MM HG: ICD-10-PCS | Mod: CPTII,S$GLB,, | Performed by: OBSTETRICS & GYNECOLOGY

## 2023-08-30 PROCEDURE — 3077F SYST BP >= 140 MM HG: CPT | Mod: CPTII,S$GLB,, | Performed by: OBSTETRICS & GYNECOLOGY

## 2023-08-30 PROCEDURE — 3078F DIAST BP <80 MM HG: CPT | Mod: CPTII,S$GLB,, | Performed by: OBSTETRICS & GYNECOLOGY

## 2023-08-30 PROCEDURE — 99999 PR PBB SHADOW E&M-EST. PATIENT-LVL III: ICD-10-PCS | Mod: PBBFAC,,, | Performed by: OBSTETRICS & GYNECOLOGY

## 2023-08-30 PROCEDURE — 1126F PR PAIN SEVERITY QUANTIFIED, NO PAIN PRESENT: ICD-10-PCS | Mod: CPTII,S$GLB,, | Performed by: OBSTETRICS & GYNECOLOGY

## 2023-08-30 PROCEDURE — 1159F PR MEDICATION LIST DOCUMENTED IN MEDICAL RECORD: ICD-10-PCS | Mod: CPTII,S$GLB,, | Performed by: OBSTETRICS & GYNECOLOGY

## 2023-08-30 PROCEDURE — 3077F PR MOST RECENT SYSTOLIC BLOOD PRESSURE >= 140 MM HG: ICD-10-PCS | Mod: CPTII,S$GLB,, | Performed by: OBSTETRICS & GYNECOLOGY

## 2023-08-30 PROCEDURE — 1126F AMNT PAIN NOTED NONE PRSNT: CPT | Mod: CPTII,S$GLB,, | Performed by: OBSTETRICS & GYNECOLOGY

## 2023-08-30 NOTE — PROGRESS NOTES
69 yo female s/p HYST many years ago for fibroids who presents for routine gyn visit.  No complaints.  Has h/o hot flashes. Did not think estradiol tablets were helpful.  Now using OTC Estroven - which has improved her symptoms.  No other gyn complaints today.  Declines std testing.  Mammogram and DEXA and colonoscopy uptodate per patient.    ROS:  GENERAL: Denies weight gain or weight loss. Feeling well overall.   SKIN: Denies rash or lesions.   HEAD: Denies head injury or headache.   CHEST: Denies chest pain or shortness of breath.   CARDIOVASCULAR: Denies palpitations or left sided chest pain.   ABDOMEN: No abdominal pain, constipation, diarrhea, nausea, vomiting or rectal bleeding.   URINARY: No frequency, dysuria, hematuria, or burning on urination.  REPRODUCTIVE: no complaints.   BREASTS: denies pain, lumps, or nipple discharge.   HEMATOLOGIC: No easy bruisability or excessive bleeding.   MUSCULOSKELETAL: Denies joint pain or swelling.   NEUROLOGIC: Denies syncope or weakness.   PSYCHIATRIC: Denies depression, anxiety or mood swings.         PE:   Vitals: BP (!) 164/72   Wt 77.5 kg (170 lb 13.7 oz)   BMI 32.28 kg/m²   APPEARANCE: Well nourished, well developed, in no acute distress.  ABDOMEN: Soft. No tenderness or masses. No hepatosplenomegaly. No hernias.  BREASTS: Symmetrical, no skin changes or visible lesions. No palpable masses, nipple discharge or adenopathy bilaterally.  PELVIC: Normal external female genitalia without lesions. Normal hair distribution. Adequate perineal body, normal urethral meatus. Atrophic vagina without lesions or discharge. Uterus/cervix surgically absent; No significant cystocele or rectocele. Bimanual exam showed vaginal cuff intact, nontender. Adnexa without masses or tenderness. Urethra and bladder normal.        AP  Routine gyn  -s/p normal breast exam: mammogram uptodate  -s/p normal pelvic exam:   -Pap not indicated  -STD testing: declined  -contraception:  -colon cancer  screeing:  uptodate   -DEXA: slade Maradiaga MD

## 2023-08-30 NOTE — LETTER
[facility]  [address]  [place] [address]  Phone: [phone] August 30, 2023     Patient: Rachael June   YOB: 1955   Date of Visit: 8/30/2023       To Whom It May Concern:    Please excuse my patient from work today 8/30/2023. She had an appointment with me.  She is able to return to work to resume full duties without restrictions starting tomorrow 8/31/2023.    If you have any questions or concerns, please don't hesitate to contact my office.    Sincerely,            Shaniqua Maradiaga MD

## 2023-10-12 DIAGNOSIS — R68.82 DECREASED LIBIDO: ICD-10-CM

## 2023-11-01 RX ORDER — ESTRADIOL 0.5 MG/1
0.5 TABLET ORAL
Qty: 30 TABLET | Refills: 11 | Status: SHIPPED | OUTPATIENT
Start: 2023-11-01

## 2023-11-09 ENCOUNTER — TELEPHONE (OUTPATIENT)
Dept: FAMILY MEDICINE | Facility: CLINIC | Age: 68
End: 2023-11-09
Payer: MEDICARE

## 2023-11-09 NOTE — TELEPHONE ENCOUNTER
----- Message from Odalys Johnson sent at 11/9/2023  9:30 AM CST -----  Type:  Sooner Apoointment Request    Caller is requesting a sooner appointment.  Caller declined first available appointment listed below.  Caller will not accept being placed on the waitlist and is requesting a message be sent to doctor.  Name of Caller:pt  When is the first available appointment?12/19  Symptoms:pt needs to come in for a check up as soon as possible   Would the patient rather a call back or a response via [facility]? call  Best Call Back Number:[phone]   Additional Information:

## 2023-11-10 ENCOUNTER — OFFICE VISIT (OUTPATIENT)
Dept: URGENT CARE | Facility: CLINIC | Age: 68
End: 2023-11-10
Payer: MEDICARE

## 2023-11-10 VITALS
RESPIRATION RATE: 18 BRPM | DIASTOLIC BLOOD PRESSURE: 81 MMHG | OXYGEN SATURATION: 98 % | HEART RATE: 78 BPM | BODY MASS INDEX: 32.1 KG/M2 | SYSTOLIC BLOOD PRESSURE: 157 MMHG | TEMPERATURE: 98 F | WEIGHT: 170 LBS | HEIGHT: 61 IN

## 2023-11-10 DIAGNOSIS — J06.9 UPPER RESPIRATORY TRACT INFECTION, UNSPECIFIED TYPE: Primary | ICD-10-CM

## 2023-11-10 LAB
CTP QC/QA: YES
CTP QC/QA: YES
MOLECULAR STREP A: NEGATIVE
SARS-COV-2 AG RESP QL IA.RAPID: NEGATIVE

## 2023-11-10 PROCEDURE — 99213 PR OFFICE/OUTPT VISIT, EST, LEVL III, 20-29 MIN: ICD-10-PCS | Mod: S$GLB,,, | Performed by: FAMILY MEDICINE

## 2023-11-10 PROCEDURE — 87811 SARS-COV-2 COVID19 W/OPTIC: CPT | Mod: QW,S$GLB,, | Performed by: FAMILY MEDICINE

## 2023-11-10 PROCEDURE — 87811 SARS CORONAVIRUS 2 ANTIGEN POCT, MANUAL READ: ICD-10-PCS | Mod: QW,S$GLB,, | Performed by: FAMILY MEDICINE

## 2023-11-10 PROCEDURE — 87651 POCT STREP A MOLECULAR: ICD-10-PCS | Mod: QW,S$GLB,, | Performed by: FAMILY MEDICINE

## 2023-11-10 PROCEDURE — 87651 STREP A DNA AMP PROBE: CPT | Mod: QW,S$GLB,, | Performed by: FAMILY MEDICINE

## 2023-11-10 PROCEDURE — 99213 OFFICE O/P EST LOW 20 MIN: CPT | Mod: S$GLB,,, | Performed by: FAMILY MEDICINE

## 2023-11-10 RX ORDER — FLUTICASONE PROPIONATE 50 MCG
1 SPRAY, SUSPENSION (ML) NASAL DAILY
Qty: 9.9 ML | Refills: 0 | Status: SHIPPED | OUTPATIENT
Start: 2023-11-10

## 2023-11-10 NOTE — PROGRESS NOTES
"Subjective:      Patient ID: Rachael June is a 68 y.o. female.    Vitals:  height is 5' 1" (1.549 m) and weight is 77.1 kg (170 lb). Her oral temperature is 97.8 °F (36.6 °C). Her blood pressure is 157/81 (abnormal) and her pulse is 78. Her respiration is 18 and oxygen saturation is 98%.     Chief Complaint: Sinus Problem    .This is a 68 y.o. female who presents today with a chief complaint of  sore throat, cough, headaches, ear fullness onset 5 days ago. Pt denies fever. Pt was exposed to strep at work     Sinus Problem  This is a new problem. The current episode started in the past 7 days. The problem is unchanged. There has been no fever. Her pain is at a severity of 7/10. The pain is moderate. Associated symptoms include congestion, coughing, ear pain, headaches, a hoarse voice, sinus pressure, sneezing and a sore throat. Pertinent negatives include no chills, diaphoresis, neck pain, shortness of breath or swollen glands. Past treatments include oral decongestants and acetaminophen. The treatment provided mild relief.       Constitution: Negative for chills and sweating.   HENT:  Positive for ear pain, congestion, sinus pressure and sore throat.    Neck: Negative for neck pain.   Respiratory:  Positive for cough. Negative for shortness of breath.    Allergic/Immunologic: Positive for sneezing.   Neurological:  Positive for headaches.      Objective:     Physical Exam   Constitutional: She does not appear ill. No distress. normal  HENT:   Head: Normocephalic and atraumatic.   Ears:   Right Ear: External ear and ear canal normal. A middle ear effusion is present.   Left Ear: External ear and ear canal normal. A middle ear effusion is present.   Nose: Congestion present.   Mouth/Throat: Mucous membranes are moist. No posterior oropharyngeal erythema.   Eyes: Pupils are equal, round, and reactive to light. Extraocular movement intact   Neck: Neck supple.   Cardiovascular: Normal rate, regular rhythm, " normal heart sounds and normal pulses.   Pulmonary/Chest: Effort normal and breath sounds normal.   Abdominal: Normal appearance.   Neurological: She is alert.   Nursing note and vitals reviewed.    Results for orders placed or performed in visit on 11/10/23   SARS Coronavirus 2 Antigen, POCT Manual Read   Result Value Ref Range    SARS Coronavirus 2 Antigen Negative Negative     Acceptable Yes    POCT Strep A, Molecular   Result Value Ref Range    Molecular Strep A, POC Negative Negative     Acceptable Yes         Assessment:     1. Upper respiratory tract infection, unspecified type        Plan:       Upper respiratory tract infection, unspecified type  -     SARS Coronavirus 2 Antigen, POCT Manual Read  -     POCT Strep A, Molecular  -     fluticasone propionate (FLONASE) 50 mcg/actuation nasal spray; 1 spray (50 mcg total) by Each Nostril route once daily.  Dispense: 9.9 mL; Refill: 0

## 2024-01-31 ENCOUNTER — PATIENT MESSAGE (OUTPATIENT)
Dept: OBSTETRICS AND GYNECOLOGY | Facility: CLINIC | Age: 69
End: 2024-01-31
Payer: MEDICARE

## 2024-02-09 RX ORDER — PANTOPRAZOLE SODIUM 40 MG/1
TABLET, DELAYED RELEASE ORAL
Qty: 90 TABLET | Refills: 1 | Status: SHIPPED | OUTPATIENT
Start: 2024-02-09 | End: 2024-04-15

## 2024-02-09 NOTE — TELEPHONE ENCOUNTER
Refill Decision Note   Rachael June  is requesting a refill authorization.  Brief Assessment and Rationale for Refill:  Approve     Medication Therapy Plan:         Alert overridden per protocol: Yes   Comments:     Note composed:10:46 AM 02/09/2024

## 2024-02-09 NOTE — TELEPHONE ENCOUNTER
No care due was identified.  Health [facility] Embedded Care Due Messages. Reference number: [id number].   2/09/2024 5:59:32 AM CST

## 2024-02-12 ENCOUNTER — TELEPHONE (OUTPATIENT)
Dept: FAMILY MEDICINE | Facility: CLINIC | Age: 69
End: 2024-02-12
Payer: MEDICARE

## 2024-02-12 NOTE — TELEPHONE ENCOUNTER
----- Message from Latoya Bobo sent at 2/12/2024  7:53 AM CST -----  Type:  Same Day Appointment Request    Caller is requesting a same day appointment.  Caller declined first available appointment listed below.    Name of Caller:Pt   When is the first available appointment? 04/24   Symptoms:abdominal pain   Best Call Back Number: [phone]  Additional Information: pain on and off for about 3 weeks

## 2024-02-20 ENCOUNTER — OFFICE VISIT (OUTPATIENT)
Dept: FAMILY MEDICINE | Facility: CLINIC | Age: 69
End: 2024-02-20
Payer: MEDICARE

## 2024-02-20 ENCOUNTER — LAB VISIT (OUTPATIENT)
Dept: LAB | Facility: HOSPITAL | Age: 69
End: 2024-02-20
Attending: FAMILY MEDICINE
Payer: MEDICARE

## 2024-02-20 VITALS
DIASTOLIC BLOOD PRESSURE: 90 MMHG | OXYGEN SATURATION: 98 % | SYSTOLIC BLOOD PRESSURE: 154 MMHG | HEART RATE: 71 BPM | HEIGHT: 61 IN | TEMPERATURE: 98 F | BODY MASS INDEX: 32.05 KG/M2 | WEIGHT: 169.75 LBS

## 2024-02-20 DIAGNOSIS — R10.30 LOWER ABDOMINAL PAIN: Primary | ICD-10-CM

## 2024-02-20 DIAGNOSIS — R03.0 ELEVATED BLOOD PRESSURE READING: ICD-10-CM

## 2024-02-20 DIAGNOSIS — R10.30 LOWER ABDOMINAL PAIN: ICD-10-CM

## 2024-02-20 PROCEDURE — 99999 PR PBB SHADOW E&M-EST. PATIENT-LVL V: CPT | Mod: PBBFAC,,, | Performed by: FAMILY MEDICINE

## 2024-02-20 PROCEDURE — 99214 OFFICE O/P EST MOD 30 MIN: CPT | Mod: S$GLB,,, | Performed by: FAMILY MEDICINE

## 2024-02-20 NOTE — LETTER
February 20, 2024      [facility]  [address]  [address]  [place] [address]  Phone: [phone]  Fax: [phone]       Patient: Rachael June   YOB: 1955  Date of Visit: 02/20/2024    To Whom It May Concern:    Piper June  was at [facility] on 02/20/2024. The patient may return to work/school on 02/21/2024 with no restrictions. If you have any questions or concerns, or if I can be of further assistance, please do not hesitate to contact me.    Sincerely,    Josefa Toussaint MA

## 2024-02-20 NOTE — PATIENT INSTRUCTIONS
Start metamucil 1 tablespoon in full glass of water daily    If still with constipation or abdominal pain, can add miralax daily.    Orders Placed This Encounter   Procedures    Urine culture    CBC Auto Differential    Comprehensive Metabolic Panel    H. pylori antigen, stool    Urinalysis     Record blood pressure, send readings from home after 2 weeks.       Lifestyle choices to lower blood pressure    Diet  DASH diet--high in fruits/vegetables and low in fat and saturated fat: 8-14 points  Salt restriction--2.3 grams sodium/day: 2-8 points    Weight loss--5-20 points per 20 lbs lost.    Exercise--30min most days/wk: 4-9 points    Limit Alcohol--2/day men; 1/day women: 2-4 points.        The DASH Diet: Healthy Eating to Control Your Blood Pressure     What is the DASH diet?    DASH stands for Dietary Approaches to Stop Hypertension. It is a balanced eating plan that your family doctor might recommend to help you lower your blood pressure. The DASH diet:  Is low in salt, saturated fat, cholesterol and total fat.   Emphasizes fruits, vegetables, and fat-free or low-fat milk and milk products.   Includes whole grains, fish, poultry and nuts.   Limits the amount of red meat, sweets, added sugars and sugar-containing beverages in your diet.   Is rich in potassium, magnesium and calcium, as well as protein and fiber.     How can the DASH diet help me stay healthy?  Getting too much sodium (salt) in your diet can contribute to high blood pressure (also called hypertension). Some salt is in foods naturally, and some salt is added to food when it is processed or prepared. Following the DASH diet can help you lower your blood pressure, or prevent high blood pressure, by reducing the amount of sodium in your diet to less than 2,300 mg per day.  The fruits, vegetables and whole grains recommended in the DASH diet provide many other elements of a healthy diet, such as lycopene, beta-carotene and isoflavones. These can help  "protect your body against common health conditions, such as cancer, osteoporosis, stroke and diabetes. Following the DASH diet can also help reduce your risk of heart disease by lowering your low-density lipoprotein (LDL, or "bad") cholesterol level.  Following the DASH diet may drop your blood pressure by a few points in as little as 2 weeks. However, you should not stop taking your blood pressure medicine, or any other prescribed medicine, without talking to your doctor first.    What kinds of foods are included in the DASH diet?  The DASH diet is nutritionally balanced for good health. You dont need to buy any special foods or pills, or cook with any special recipes, to follow the DASH diet. If you follow the DASH diet, you will eat about 2,000 calories each day. These calories will come from a variety of foods.    Where is sodium in my diet?  Food Serving Sodium Content   ¼ teaspoon table salt 575 mg   ½ teaspoon table salt 1,150 mg   1 teaspoon table salt 2,300 mg   1 hot dog 460 mg   1 regular fast-food hamburger 600 mg   2 ounces processed cheese 600 mg   1 tablespoon soy sauce 900 mg   1 serving frozen pizza with meat and vegetables 982 mg   8 ounces regular potato chips 1,192 mg     The DASH diet  Whole grains (6 to 8 servings a day)   Vegetables (4 to 5 servings a day)   Fruits (4 to 5 servings a day)   Low-fat or fat-free milk and milk products (2 to 3 servings a day)   Lean meats, poultry and fish (6 or fewer servings a day)   Nuts, seeds and beans (4 to 5 servings a week)   Fats and oils (2 to 3 servings a day)   Sweets, preferably low-fat or fat-free (5 or fewer a week)   Sodium (no more than 2,300 mg a day)   You can adapt the DASH diet to meet your needs. For example:  If you drink alcohol, limit yourself to 2 drinks or less per day for men and 1 drink or less per day for women.   To reduce your blood pressure even more, replace some of the carbohydrates in the DASH diet with low-fat protein and " unsaturated fats.   If you need to lose weight, reduce the number of calories you eat to about 1,600 per day.   Follow a lower-sodium version (no more than 1,500 mg daily) if you are 40 years of age or older, you are  or you already have high blood pressure.     How can I change my eating habits?  Dont be discouraged if following the DASH diet is difficult at first. Start with small, achievable goals. The following ideas can help you make healthy changes:  Pay attention to your current eating habits. Make a list of everything you eat for 2 or 3 days. Compare this list to the DASH diet recommendations above and see what changes you need to make in your diet.   Make one change at a time. For example, start by choosing lower-fat versions of your favorite foods or adding more whole grains to your meals.   Learn what makes a serving for each type of food. For example, 1 serving equals 1 slice of bread, 8 ounces of milk, 1 cup of raw vegetables or 1/2 cup of cooked vegetables. For more serving sizes, go to the [facility] site. Dont have a measuring cup? One serving (3 ounces) of meat or poultry is about the size of a deck of cards. One serving (1/2 cup) of rice or potato looks like half a baseball, and a serving of cheese is about the size of 4 stacked dice.   If eating more fruits and vegetables gives you gas, bloating or diarrhea, increase these foods slowly. You can also talk to your family doctor about taking over-the-counter medicines to reduce these symptoms until your body adjusts.   Get more exercise. Physical activity helps lower your blood pressure and can help you lose more weight.   Use salt-free seasonings, such as spices and herbs, to add flavor to your recipes and reduce or eliminate salt.   Include as many fresh and unprocessed foods as possible. Cut back on frozen dinners, packaged mixes, canned soups and bottled salad dressings, which are often high in sodium.   When buying canned, frozen or  "processed foods, check nutrition labels for the amount of sodium, sugar and saturated fat. Look for the phrases "no salt added," "sodium-free," "low sodium" or "very low sodium" on food packages. Choose foods with monounsaturated and polyunsaturated fats.   Steam, grill, poach, roast or stir-zimmer foods. Use low-sodium broth or water instead of butter or oil for sautéing.   When you eat at a restaurant, ask how foods are prepared. Ask if your order can be made without added salt. Dont add salty condiments, such as ketchup, mustard, pickles or sauces, to your food.   Other Organizations  Centers for Disease Control and Prevention   National Heart, Lung, and Blood [place]   Written by [email or web address] editorial staff  Reviewed/Updated: 02/11  Created: 08/09     "

## 2024-02-20 NOTE — PROGRESS NOTES
"(Portions of this note were dictated using voice recognition software and may contain dictation related errors in spelling/grammar/syntax not found on text review)    CC:   Chief Complaint   Patient presents with    Abdominal Pain     Intermittent "burning" pain in lower abdomen, occasional pressure feeling in upper abdomen; intermittent constipation, stool softeners and Miralax give relief       HPI: 68 y.o. female Annual exam was July 2023.  Here for urgent care concern for lower abdominal pain intermittently for the last 3 to 4 weeks.  This is not constant.  Usually centered periumbilically, no radiation to back.  Denies any nausea, vomiting, chest pain, shortness and breath.  No diarrhea.  No blood in the stool.  Sometimes gets constipated and takes MiraLax on an as needed basis which helps relieve.  Colonoscopy as below.    BP elevated today, not on any meds.  Has had occasionally elevated BP  on chart review.  Checks with a home device, usually if she feels like this is better controlled.    Past Medical History:   Diagnosis Date    Abnormal Pap smear of cervix     Colon polyp     Condition not found     h/o abnl vaginal cuff biopsy-mild dysplasia    Dysplasia of vagina     vaginal cuff biopsy    Genital herpes, unspecified     GERD (gastroesophageal reflux disease)     Hyperlipidemia     Insulinoma     s/p resection    Special screening for malignant neoplasms, colon 7/6/2015       Past Surgical History:   Procedure Laterality Date    COLONOSCOPY N/A 6/8/2017    Procedure: COLONOSCOPY;  Surgeon: Chadd Davis MD;  Location: [address]);  Service: Endoscopy;  Laterality: N/A;    COLONOSCOPY W/ POLYPECTOMY      HYSTERECTOMY      fibroids    insulinoma resection      SHOULDER ARTHROSCOPY Left 9/23/2015    Dr Rm        Family History   Problem Relation Age of Onset    Heart disease Mother     Hypertension Mother     Diabetes Mother     Hyperlipidemia Mother     Stroke Father     Diabetes Father  " "   Heart disease Father     Cancer Sister         breast cancer    Diabetes Sister     Breast cancer Sister     Cancer Maternal Aunt         ovarian cancer    Diabetes Brother     Diabetes Sister     Heart disease Brother        Social History     Tobacco Use    Smoking status: Never    Smokeless tobacco: Never   Substance Use Topics    Alcohol use: No    Drug use: No       Lab Results   Component Value Date    WBC 6.03 07/14/2023    HGB 13.7 07/14/2023    HCT 42.0 07/14/2023    MCV 93 07/14/2023     07/14/2023    CHOL 189 07/14/2023    TRIG 52 07/14/2023    HDL 65 07/14/2023    ALT 26 07/14/2023    AST 22 07/14/2023    BILITOT 0.4 07/14/2023    ALKPHOS 98 07/14/2023     07/14/2023    K 4.4 07/14/2023     07/14/2023    CREATININE 0.8 07/14/2023    ESTGFRAFRICA >60.0 07/15/2022    EGFRNONAA >60.0 07/15/2022    EGFRNORACEVR >60.0 07/14/2023    CALCIUM 10.0 07/14/2023    ALBUMIN 4.0 07/14/2023    BUN 10 07/14/2023    CO2 28 07/14/2023    TSH 1.893 07/14/2023    INR 1.0 01/15/2015    HGBA1C 5.7 (H) 07/14/2023    MICALBCREAT 10.0 10/29/2008    LDLCALC 113.6 07/14/2023     07/14/2023    HHBOQNNN72TH 35 06/06/2017                 Vital signs reviewed  Vitals:    02/20/24 1122   BP: (!) 154/90   BP Location: Left arm   Patient Position: Sitting   BP Method: Medium (Manual)   Pulse: 71   Temp: 98.1 °F (36.7 °C)   TempSrc: Oral   SpO2: 98%   Weight: 77 kg (169 lb 12.1 oz)   Height: 5' 1" (1.549 m)       Wt Readings from Last 4 Encounters:   02/20/24 77 kg (169 lb 12.1 oz)   11/10/23 77.1 kg (170 lb)   08/30/23 77.5 kg (170 lb 13.7 oz)   07/18/23 75.2 kg (165 lb 12.6 oz)       PE:   APPEARANCE: Well nourished, well developed, in no acute distress.    NECK: Supple with no cervical lymphadenopathy.    CHEST: Good inspiratory effort. Lungs clear to auscultation with no wheezes or crackles.  CARDIOVASCULAR: Normal S1, S2. No rubs, murmurs, or gallops.  ABDOMEN: Bowel sounds normal. Not distended. Soft.  " Mild epigastric tenderness no rebound or guarding.  Mild suprapubic discomfort no rebound or guarding.  EXTREMITIES: No edema       IMPRESSION  1. Lower abdominal pain    2. Elevated blood pressure reading            PLAN  Orders Placed This Encounter   Procedures    Urine culture    CBC Auto Differential    Comprehensive Metabolic Panel    H. pylori antigen, stool    Urinalysis       Intermittent lower abdominal pain, consider constipation related symptoms.  She was concerned given prior history of H pylori status post eradication and proven negative status subsequently by stool assay.  She is interested in getting retested for H pylori.  Stool assay to be done as above.  Will check labs as above including urine testing case of atypical presentation for UTI.  Has colonoscopy up-to-date in 2022, 2 polyps adenomas, no sign of diverticulosis..  Have recommended she start on Metamucil fiber supplementation 1 tbsp full glass of water daily.  If still continued symptoms despite this, can add back MiraLax but do on a regular basis to see if this will help.  No need for imaging at this time given benign exam although if persistent pain or worsening symptoms along with any systemic symptoms may need further imaging     Elevated blood pressure: Advise home check over the next 2-3 weeks and send report through portal.            SCREENINGS       Immunizations:   covid booster eligible  tdap 2016  PCV20 7/2022  Flu up-to-date  Zoster, can get at pharmacy     Age/demographic appropriate health maintenance:  MMG   August 2023  DEXA 2022 normal.    colonoscopy 2017, internal hemorrhoids otherwise normal. Rpt April 2022 (2 polyps removed.--[facility])

## 2024-02-22 ENCOUNTER — HOSPITAL ENCOUNTER (EMERGENCY)
Facility: HOSPITAL | Age: 69
Discharge: HOME | End: 2024-02-22
Attending: STUDENT IN AN ORGANIZED HEALTH CARE EDUCATION/TRAINING PROGRAM
Payer: MEDICARE

## 2024-02-22 VITALS
OXYGEN SATURATION: 100 % | HEART RATE: 64 BPM | RESPIRATION RATE: 20 BRPM | TEMPERATURE: 98 F | BODY MASS INDEX: 32.07 KG/M2 | DIASTOLIC BLOOD PRESSURE: 72 MMHG | SYSTOLIC BLOOD PRESSURE: 165 MMHG | WEIGHT: 169.75 LBS

## 2024-02-22 DIAGNOSIS — R07.9 CHEST PAIN: ICD-10-CM

## 2024-02-22 DIAGNOSIS — I10 HYPERTENSION, UNSPECIFIED TYPE: Primary | ICD-10-CM

## 2024-02-22 LAB
BNP SERPL-MCNC: 46 PG/ML (ref 0–99)
TROPONIN I SERPL DL<=0.01 NG/ML-MCNC: <0.006 NG/ML (ref 0–0.03)

## 2024-02-22 PROCEDURE — 25000003 PHARM REV CODE 250: Performed by: STUDENT IN AN ORGANIZED HEALTH CARE EDUCATION/TRAINING PROGRAM

## 2024-02-22 PROCEDURE — 84484 ASSAY OF TROPONIN QUANT: CPT | Performed by: STUDENT IN AN ORGANIZED HEALTH CARE EDUCATION/TRAINING PROGRAM

## 2024-02-22 PROCEDURE — 93005 ELECTROCARDIOGRAM TRACING: CPT

## 2024-02-22 PROCEDURE — 99285 EMERGENCY DEPT VISIT HI MDM: CPT | Mod: 25

## 2024-02-22 PROCEDURE — 93010 ELECTROCARDIOGRAM REPORT: CPT | Mod: ,,, | Performed by: INTERNAL MEDICINE

## 2024-02-22 PROCEDURE — 83880 ASSAY OF NATRIURETIC PEPTIDE: CPT | Performed by: STUDENT IN AN ORGANIZED HEALTH CARE EDUCATION/TRAINING PROGRAM

## 2024-02-22 RX ORDER — ASPIRIN 325 MG
325 TABLET, DELAYED RELEASE (ENTERIC COATED) ORAL
Status: COMPLETED | OUTPATIENT
Start: 2024-02-22 | End: 2024-02-22

## 2024-02-22 RX ORDER — ALUMINUM HYDROXIDE, MAGNESIUM HYDROXIDE, AND SIMETHICONE 1200; 120; 1200 MG/30ML; MG/30ML; MG/30ML
5 SUSPENSION ORAL
Status: DISCONTINUED | OUTPATIENT
Start: 2024-02-22 | End: 2024-02-22

## 2024-02-22 RX ADMIN — ASPIRIN 325 MG: 325 TABLET, COATED ORAL at 01:02

## 2024-02-22 NOTE — ED PROVIDER NOTES
NAME:  Rachael June  CSN:     [id number]  MRN:    [id number]  ADMIT DATE: 2/22/2024        eMERGENCY dEPARTMENT eNCOUnter    CHIEF COMPLAINT    Chief Complaint   Patient presents with    Chest Pain     Sudden onset at 1100.  Described as burning/sharp.  Hx of GERD.  Took 2 paola selter tablets, no relief.        HPI    Rachael June is a 68 y.o. female with a past medical history of  has a past medical history of Abnormal Pap smear of cervix, Colon polyp, Condition not found, Dysplasia of vagina, Genital herpes, unspecified, GERD (gastroesophageal reflux disease), Hyperlipidemia, Insulinoma, and Special screening for malignant neoplasms, colon (7/6/2015).     she presents to the ED due to left-sided chest pain starting at 11:00 a.m..  She states that she initially had burning in the center of her chest that did not feel similar to heartburn however she was concerned due to the sharp pain in the left chest that does not typically occur.  She does take Protonix daily and states she took this today.  Took 2 [place] as well.  Notes that sharp pain in the left chest has resolved but now feels a dull achy sensation in the left chest.  Pain does not radiate.  No nausea or vomiting.  States she has not had pain in the left chest like this previously though she does have a history of heartburn.      HPI       PAST MEDICAL HISTORY  Past Medical History:   Diagnosis Date    Abnormal Pap smear of cervix     Colon polyp     Condition not found     h/o abnl vaginal cuff biopsy-mild dysplasia    Dysplasia of vagina     vaginal cuff biopsy    Genital herpes, unspecified     GERD (gastroesophageal reflux disease)     Hyperlipidemia     Insulinoma     s/p resection    Special screening for malignant neoplasms, colon 7/6/2015       SURGICAL HISTORY    Past Surgical History:   Procedure Laterality Date    COLONOSCOPY N/A 6/8/2017    Procedure: COLONOSCOPY;  Surgeon: Chadd Davis MD;  Location: [facility] ([address] [place] [address]);   Service: Endoscopy;  Laterality: N/A;    COLONOSCOPY W/ POLYPECTOMY      HYSTERECTOMY      fibroids    insulinoma resection      SHOULDER ARTHROSCOPY Left 9/23/2015    Dr mR        FAMILY HISTORY    Family History   Problem Relation Age of Onset    Heart disease Mother     Hypertension Mother     Diabetes Mother     Hyperlipidemia Mother     Stroke Father     Diabetes Father     Heart disease Father     Cancer Sister         breast cancer    Diabetes Sister     Breast cancer Sister     Cancer Maternal Aunt         ovarian cancer    Diabetes Brother     Diabetes Sister     Heart disease Brother        SOCIAL HISTORY    Social History     Socioeconomic History    Marital status:     Number of children: 3   Occupational History    Occupation: bank clerk     Employer: [employer]   Tobacco Use    Smoking status: Never    Smokeless tobacco: Never   Substance and Sexual Activity    Alcohol use: No    Drug use: No    Sexual activity: Yes     Partners: Male     Birth control/protection: Surgical   Social History Narrative    She exercises every day.she has 8 grandchildren.       MEDICATIONS  Current Outpatient Medications   Medication Instructions    aspirin (ECOTRIN) 81 mg, Daily    betamethasone valerate 0.1% (VALISONE) 0.1 % Lotn Topical (Top), 2 times daily, 3 drops AU Bid.    biotin 1,000 mcg, Oral, Daily    calcium carbonate (OS-ANDREW) 600 mg calcium (1,500 mg) Tab 1 tablet, Oral, Daily    estradioL (ESTRACE) 0.5 mg, Oral    estradioL (ESTRACE) 0.5 mg, Oral    famotidine (PEPCID) 20 mg, Oral, Daily PRN    fish oil-omega-3 fatty acids 300-1,000 mg capsule 2 g, Daily    fluocinonide (LIDEX) 0.05 % gel SMARTSIG:sparingly Topical 2-4 Times Daily    fluticasone propionate (FLONASE) 100 mcg, Each Nostril, Daily    fluticasone propionate (FLONASE) 50 mcg, Each Nostril, Daily    TI ROOT, BULK, MISC Misc.(Non-Drug; Combo Route)    multivitamin capsule 1 capsule, Oral, Daily    pantoprazole (PROTONIX)  40 MG tablet TAKE 1 TABLET(40 MG) BY MOUTH EVERY DAY    pravastatin (PRAVACHOL) 40 mg, Oral, Daily    RED BEET ROOT-SOUR CHERRY EXT ORAL Oral    valACYclovir (VALTREX) 1,000 mg, Oral, Daily, 1/2 tablet daily    vitamin D (VITAMIN D3) 1,000 Units, Oral, Daily       ALLERGIES    Review of patient's allergies indicates:   Allergen Reactions    Adhesive tape-silicones     Pcn [penicillins]          REVIEW OF SYSTEMS   Review of Systems       PHYSICAL EXAM    Reviewed Triage Note    VITAL SIGNS:   ED Triage Vitals [02/22/24 1302]   Enc Vitals Group      BP (!) 181/78      Pulse 70      Resp 16      Temp 97 °F (36.1 °C)      Temp Source Oral      SpO2 99 %      Weight 169 lb 12.1 oz      Height       Head Circumference       Peak Flow       Pain Score       Pain Loc       Pain Edu?       Excl. in GC?        Patient Vitals for the past 24 hrs:   BP Temp Temp src Pulse Resp SpO2   02/22/24 1611 (!) 165/72 98 °F (36.7 °C) Oral 64 20 100 %   02/22/24 1600 (!) 165/72 -- -- 65 (!) 21 99 %   02/22/24 1545 (!) 193/86 -- -- -- -- --       Physical Exam    Nursing note and vitals reviewed.  Constitutional: She appears well-developed and well-nourished.   HENT:   Head: Normocephalic and atraumatic.   Eyes: EOM are normal. Pupils are equal, round, and reactive to light.   Neck: Neck supple.   Normal range of motion.  Cardiovascular:  Normal rate and regular rhythm.           Pulmonary/Chest: Breath sounds normal. No respiratory distress.   Abdominal: Abdomen is soft. There is no abdominal tenderness.   Musculoskeletal:         General: No edema. Normal range of motion.      Cervical back: Normal range of motion and neck supple.     Neurological: She is alert and oriented to person, place, and time.   Skin: Skin is warm and dry.   Psychiatric: She has a normal mood and affect.                EKG     Interpreted by EM physician if performed:   Sinus rhythm at a rate of 70.  No STEMI       ECG Results              EKG 12-lead (Chest  Pain) Age >30 (In process)        Collection Time Result Time QRS Duration OHS QTC Calculation    02/22/24 13:01:46 02/22/24 14:48:15 92 416                     In process by Interface, Lab In [facility] (02/22/24 14:48:21)                   Narrative:    Test Reason : R07.9,    Vent. Rate : 070 BPM     Atrial Rate : 070 BPM     P-R Int : 128 ms          QRS Dur : 092 ms      QT Int : 386 ms       P-R-T Axes : 058 000 073 degrees     QTc Int : 416 ms    Normal sinus rhythm  Normal ECG  When compared with ECG of 24-AUG-2022 08:47,  No significant change was found    Referred By: [email or web address]   SELF           Confirmed By:                                       LABS  Pertinent labs reviewed. (See chart for details)   Labs Reviewed   TROPONIN I   B-TYPE NATRIURETIC PEPTIDE         RADIOLOGY          Imaging Results              X-Ray Chest AP Portable (Final result)  Result time 02/22/24 16:06:50      Final result by Marizol Matamoros MD (02/22/24 16:06:50)                   Impression:      No acute finding or detrimental change      Electronically signed by: Marizol Matamoros MD  Date:    02/22/2024  Time:    16:06               Narrative:    EXAMINATION:  XR CHEST AP PORTABLE    CLINICAL HISTORY:  Chest pain, unspecified    TECHNIQUE:  Single frontal view of the chest was performed.    COMPARISON:  August 24, 2022    FINDINGS:  Heart size is not enlarged.  There is no pleural effusion.  Osseous structures demonstrate degenerative change.  The lungs are clear.                                        PROCEDURES    Procedures      ED COURSE & MEDICAL DECISION MAKING    Pertinent Labs & Imaging studies reviewed. (See chart for details and specific orders.)          Summary of review of records:       Medical Decision Making  Amount and/or Complexity of Data Reviewed  Labs: ordered. Decision-making details documented in ED Course.  Radiology: ordered.    Risk  OTC drugs.      Rachael June is a 68 y.o. female who  presents for evaluation of left-sided chest pain that was initially sharp in nature, not dull and achy.  Nonradiating.  Notes substernal burning sensation that feels mostly consistent with reflux and indigestion that she has had for some time.    Differential includes but is not limited to ACS, GERD, infection amongst others.  Patient is requesting that I do not repeat CBC or CMP as she just had these done 2 days ago.      Additional MDM:   Heart Score:    History:          Slightly suspicious.  ECG:             Normal  Age:               >65 years  Risk factors: 1-2 risk factors  Troponin:       Less than or equal to normal limit  Heart Score = 3             Medications   aspirin EC tablet 325 mg (325 mg Oral Given 2/22/24 1325)       ED Course as of 02/23/24 1416   Thu Feb 22, 2024   1402 Troponin I: <0.006 [HL]   1402 BNP: 46 [HL]   1425 On re-evaluation, patient is sleeping comfortably in bed. [HL]      ED Course User Index  [HL] Elvi Tang, DO       Findings discussed with patient at bedside.  Notes that her pain has resolved and just feels similar to GERD.  Requesting GI cocktail as she states this typically helps.  Informed that we do not have lidocaine and offered Maalox which she declined stating she would take this at home.  Did discuss that I would like to do a repeat troponin given the acuity of onset of her symptoms and that a 1 time negative troponin within 2 hours of the onset of her pain is not enough information at this time to definitively say that she does not have some underlying myocardial injury.  While she does understand this she states she did not want to wait any longer for testing.  Did discuss that I will put in a referral for cardiologist.  She states she does not want to see a cardiologist she would like to follow up with her primary care physician.  Extensively discussed the importance of close follow up with Cardiology as well as primary care.  Reasons to return discussed and all  questions addressed.      FINAL IMPRESSION    Final diagnoses:  [R07.9] Chest pain  [I10] Hypertension, unspecified type (Primary)       DISPOSITION  Patient discharge in stable condition        ED Prescriptions    None       Follow-up Information       Follow up With Specialties Details Why Contact Info    Froylan Eckert MD Family Medicine Schedule an appointment as soon as possible for a visit   [address]  [address]  [facility] [address] [phone]      [facility] Emergency Dept Emergency Medicine  As needed, If symptoms worsen [address] [facility] [place] [address] [phone]              DISCLAIMER: This note was prepared with mChron voice recognition transcription software. Garbled syntax, mangled pronouns, and other bizarre constructions may be attributed to that software system.             Elvi Tang, DO  02/23/24 1419

## 2024-02-25 ENCOUNTER — PATIENT MESSAGE (OUTPATIENT)
Dept: FAMILY MEDICINE | Facility: CLINIC | Age: 69
End: 2024-02-25
Payer: MEDICARE

## 2024-02-26 PROBLEM — I10 HTN (HYPERTENSION): Status: ACTIVE | Noted: 2024-02-26

## 2024-02-26 LAB
OHS QRS DURATION: 92 MS
OHS QTC CALCULATION: 416 MS

## 2024-02-26 RX ORDER — AMLODIPINE BESYLATE 5 MG/1
5 TABLET ORAL DAILY
Qty: 30 TABLET | Refills: 11 | Status: SHIPPED | OUTPATIENT
Start: 2024-02-26 | End: 2024-03-25

## 2024-03-05 ENCOUNTER — PATIENT MESSAGE (OUTPATIENT)
Dept: ADMINISTRATIVE | Facility: HOSPITAL | Age: 69
End: 2024-03-05
Payer: MEDICARE

## 2024-03-20 ENCOUNTER — PATIENT OUTREACH (OUTPATIENT)
Dept: ADMINISTRATIVE | Facility: HOSPITAL | Age: 69
End: 2024-03-20
Payer: MEDICARE

## 2024-03-20 VITALS — DIASTOLIC BLOOD PRESSURE: 65 MMHG | SYSTOLIC BLOOD PRESSURE: 128 MMHG | HEART RATE: 73 BPM

## 2024-03-20 NOTE — PROGRESS NOTES
Population Health Chart Review & Patient Outreach Details      Additional [facility] Health Notes:    Patient sent B/P log. Documented b/p in flowsheet       Updates Requested / Reviewed:      Updated Care Coordination Note, Care Everywhere, Care Team Updated, and Immunizations Reconciliation Completed or Queried: [facility] Topics Overdue:      [facility] Score: 1     Uncontrolled BP    RSV Vaccine                  Health Maintenance Topic(s) Outreach Outcomes & Actions Taken:    Blood Pressure - Outreach Outcomes & Actions Taken  : Remote Blood Pressure Reading Captured

## 2024-03-25 ENCOUNTER — PATIENT MESSAGE (OUTPATIENT)
Dept: FAMILY MEDICINE | Facility: CLINIC | Age: 69
End: 2024-03-25
Payer: MEDICARE

## 2024-03-25 RX ORDER — VALSARTAN 80 MG/1
80 TABLET ORAL DAILY
Qty: 30 TABLET | Refills: 11 | Status: SHIPPED | OUTPATIENT
Start: 2024-03-25

## 2024-03-27 NOTE — PROGRESS NOTES
PCP - Froylan Eckert MD  Referring Physician:     Subjective:   Rachael June is a 68 y.o. female with Hx of Hypertension, Hyperlipidemia and GERD who presents for evaluation for chest pain. Patient was discharged from the ED a month ago on 2024 after she presented to the ED with left-sided chest pain and left arm that started around 11:00 AM and it lasted about an hour. By the time she got to the ED it had subsided. Chest pain at the time was described as a sharp left sided chest pains. She has symptoms of heartburn and reports that this was different, it wasn't a burning/pulling sensation. Initial Troponin was negative. Patient did not want to wait for repeat Troponin. Blood pressure was elevated and she was recently started on Valsartan on 3/25. Previously was on amlodipine but she was getting pains in her leg. Patient states that she would go frequently to the gym 3-4x a week and would do a lot of walking. She states she feels tired faster than before and this is since last month. Patient denies any chest pain, palpitations, shortness of breath, orthopnea, PND or lower extremity edema but does say her legs feel swollen.     Surgical: Reviewed, as below.  Family: Reviewed, as below. Diabetes. Mother  and Father  with heart disease. Mother had emergent CABG in her 70s. Brothers also with the youngest had CABG in his 60s and another in his 50s.   Social: Reviewed, as below.    ROS:    Pertinent ROS included in HPI and below.  PMH:     Past Medical History:   Diagnosis Date    Abnormal Pap smear of cervix     Colon polyp     Condition not found     h/o abnl vaginal cuff biopsy-mild dysplasia    Dysplasia of vagina     vaginal cuff biopsy    Genital herpes, unspecified     GERD (gastroesophageal reflux disease)     HTN (hypertension)     HTN (hypertension) 2024    Hyperlipidemia     Insulinoma     s/p resection    Special screening for malignant neoplasms, colon  07/06/2015     Past Surgical History:   Procedure Laterality Date    COLONOSCOPY N/A 6/8/2017    Procedure: COLONOSCOPY;  Surgeon: Chadd Davis MD;  Location: [facility] ([address] [place] [address]);  Service: Endoscopy;  Laterality: N/A;    COLONOSCOPY W/ POLYPECTOMY      HYSTERECTOMY      fibroids    insulinoma resection      SHOULDER ARTHROSCOPY Left 9/23/2015    Dr Rm      Allergies:     Review of patient's allergies indicates:   Allergen Reactions    Adhesive tape-silicones     Pcn [penicillins]      Medications:     Current Outpatient Medications:     aspirin (ECOTRIN) 81 MG EC tablet, Take 81 mg by mouth once daily., Disp: , Rfl:     biotin 1 mg tablet, Take 1,000 mcg by mouth once daily., Disp: , Rfl:     calcium carbonate (OS-ANDREW) 600 mg calcium (1,500 mg) Tab, Take 1 tablet by mouth once daily., Disp: , Rfl:     estradioL (ESTRACE) 0.5 MG tablet, TAKE 1 TABLET(0.5 MG) BY MOUTH EVERY DAY, Disp: 30 tablet, Rfl: 11    fish oil-omega-3 fatty acids 300-1,000 mg capsule, Take 2 g by mouth once daily., Disp: , Rfl:     fluocinonide (LIDEX) 0.05 % gel, SMARTSIG:sparingly Topical 2-4 Times Daily, Disp: , Rfl:     fluticasone propionate (FLONASE) 50 mcg/actuation nasal spray, 2 sprays (100 mcg total) by Each Nostril route once daily., Disp: 16 g, Rfl: 11    fluticasone propionate (FLONASE) 50 mcg/actuation nasal spray, 1 spray (50 mcg total) by Each Nostril route once daily., Disp: 9.9 mL, Rfl: 0    TI ROOT, BULK, MISC, by Misc.(Non-Drug; Combo Route) route., Disp: , Rfl:     multivitamin capsule, Take 1 capsule by mouth once daily., Disp: , Rfl:     pantoprazole (PROTONIX) 40 MG tablet, TAKE 1 TABLET(40 MG) BY MOUTH EVERY DAY, Disp: 90 tablet, Rfl: 1    pravastatin (PRAVACHOL) 40 MG tablet, Take 1 tablet (40 mg total) by mouth once daily., Disp: 90 tablet, Rfl: 3    RED BEET ROOT-SOUR CHERRY EXT ORAL, Take by mouth., Disp: , Rfl:     valACYclovir (VALTREX) 1000 MG tablet, Take 1 tablet (1,000 mg total) by mouth once  "daily. 1/2 tablet daily, Disp: 45 tablet, Rfl: 3    valsartan (DIOVAN) 80 MG tablet, Take 1 tablet (80 mg total) by mouth once daily., Disp: 30 tablet, Rfl: 11    vitamin D 1000 units Tab, Take 1,000 Units by mouth once daily., Disp: , Rfl:     betamethasone valerate 0.1% (VALISONE) 0.1 % Lotn, Apply topically 2 (two) times daily. 3 drops AU Bid. for 14 days, Disp: 60 mL, Rfl: 0    estradioL (ESTRACE) 0.5 MG tablet, TAKE 1 TABLET(0.5 MG) BY MOUTH EVERY DAY (Patient not taking: Reported on 11/10/2023), Disp: 30 tablet, Rfl: 11    famotidine (PEPCID) 20 MG tablet, Take 20 mg by mouth daily as needed for Heartburn., Disp: , Rfl:    Social History:     Social History     Tobacco Use    Smoking status: Never    Smokeless tobacco: Never   Substance Use Topics    Alcohol use: No     Family History:     Family History   Problem Relation Age of Onset    Heart disease Mother     Hypertension Mother     Diabetes Mother     Hyperlipidemia Mother     Stroke Father     Diabetes Father     Heart disease Father     Cancer Sister         breast cancer    Diabetes Sister     Breast cancer Sister     Cancer Maternal Aunt         ovarian cancer    Diabetes Brother     Diabetes Sister     Heart disease Brother      Physical Exam:   BP (!) 149/73   Pulse 66   Ht 5' 1" (1.549 m)   Wt 78 kg (171 lb 15.3 oz)   SpO2 98%   BMI 32.49 kg/m²      Constitutional: No apparent distress, conversant  Neck: No jugular venous distension, no carotid bruits  CV: Regular rate and rhythm, no murmurs, normal S1/S2  Pulm: Clear to auscultation bilaterally  Extremities: No lower extremity edema, warm with palpable pulses    Labs:     Blood Tests:  Lab Results   Component Value Date    BNP 46 02/22/2024     02/20/2024    K 4.1 02/20/2024     02/20/2024    CO2 26 02/20/2024    BUN 15 02/20/2024    CREATININE 0.9 02/20/2024    GLU 81 02/20/2024    HGBA1C 5.7 (H) 07/14/2023    MG 2.1 08/24/2022    AST 25 02/20/2024    ALT 33 02/20/2024    ALBUMIN " 4.1 02/20/2024    PROT 7.8 02/20/2024    BILITOT 0.6 02/20/2024    WBC 6.64 02/20/2024    HGB 14.0 02/20/2024    HCT 42.8 02/20/2024    MCV 92 02/20/2024     02/20/2024    INR 1.0 01/15/2015    TSH 1.893 07/14/2023       Lab Results   Component Value Date    CHOL 189 07/14/2023    HDL 65 07/14/2023    TRIG 52 07/14/2023       Lab Results   Component Value Date    LDLCALC 113.6 07/14/2023       Urine Tests:  Lab Results   Component Value Date    COLORU Yellow 02/20/2024    APPEARANCEUA Clear 02/20/2024    PHUR 7.0 02/20/2024    SPECGRAV 1.020 02/20/2024    PROTEINUA Negative 02/20/2024    GLUCUA Negative 02/20/2024    KETONESU Negative 02/20/2024    BILIRUBINUA Negative 02/20/2024    OCCULTUA Negative 02/20/2024    NITRITE Negative 02/20/2024    UROBILINOGEN Negative 02/20/2024    LEUKOCYTESUR Negative 02/20/2024    CREATRANDUR 28 10/29/2008       Imaging:     Echocardiogram (6/21/2017)    1 - Normal left ventricular systolic function (EF 55-60%).     2 - Normal right ventricular systolic function .     3 - Normal left ventricular diastolic function.     4 - The estimated PA systolic pressure is 25 mmHg.     Stress testing (9/13/2022)    The EKG portion of this study is negative for ischemia.    The patient reported no chest pain during the stress test.    During stress, occasional PVCs are noted.    The exercise capacity was normal.    The nuclear portion of this study will be reported separately.    Cath Lab  None    Other  None    Assessment:     1. Hyperlipidemia, unspecified hyperlipidemia type    2. Chest pain    3. Hypertension, unspecified type      Plan:     Hyperlipidemia, unspecified hyperlipidemia type  The 10-year ASCVD risk score (Pita LAGUERRE, et al., 2019) is: 12.5%    Values used to calculate the score:      Age: 68 years      Sex: Female      Is Non- : Yes      Diabetic: No      Tobacco smoker: No      Systolic Blood Pressure: 149 mmHg      Is BP treated: Yes      HDL  Cholesterol: 65 mg/dL      Total Cholesterol: 189 mg/dL  - Continue with Pravastatin 40 mg, daily    Chest pain  -     Ambulatory referral/consult to Cardiology  -     CT Cardiac Scoring; Future; Expected date: 03/28/2024  -     Will consider further testing after CAC    Hypertension, unspecified type       - Continue with Valsartan 80 mg, daily     Vernell Alamo MD  Cardiovascular Disease, PGY IV  [facility]

## 2024-03-28 ENCOUNTER — OFFICE VISIT (OUTPATIENT)
Dept: CARDIOLOGY | Facility: CLINIC | Age: 69
End: 2024-03-28
Payer: MEDICARE

## 2024-03-28 VITALS
SYSTOLIC BLOOD PRESSURE: 149 MMHG | BODY MASS INDEX: 32.46 KG/M2 | DIASTOLIC BLOOD PRESSURE: 73 MMHG | WEIGHT: 171.94 LBS | HEART RATE: 66 BPM | HEIGHT: 61 IN | OXYGEN SATURATION: 98 %

## 2024-03-28 DIAGNOSIS — I70.0 ATHEROSCLEROSIS OF AORTA: ICD-10-CM

## 2024-03-28 DIAGNOSIS — I10 HYPERTENSION, UNSPECIFIED TYPE: ICD-10-CM

## 2024-03-28 DIAGNOSIS — R07.9 CHEST PAIN: ICD-10-CM

## 2024-03-28 DIAGNOSIS — E78.5 HYPERLIPIDEMIA, UNSPECIFIED HYPERLIPIDEMIA TYPE: Primary | ICD-10-CM

## 2024-03-28 PROCEDURE — 99999 PR PBB SHADOW E&M-EST. PATIENT-LVL V: CPT | Mod: PBBFAC,,, | Performed by: STUDENT IN AN ORGANIZED HEALTH CARE EDUCATION/TRAINING PROGRAM

## 2024-03-28 PROCEDURE — 99204 OFFICE O/P NEW MOD 45 MIN: CPT | Mod: S$GLB,,, | Performed by: STUDENT IN AN ORGANIZED HEALTH CARE EDUCATION/TRAINING PROGRAM

## 2024-04-05 ENCOUNTER — HOSPITAL ENCOUNTER (OUTPATIENT)
Dept: RADIOLOGY | Facility: HOSPITAL | Age: 69
Discharge: HOME | End: 2024-04-05
Attending: STUDENT IN AN ORGANIZED HEALTH CARE EDUCATION/TRAINING PROGRAM
Payer: MEDICARE

## 2024-04-05 DIAGNOSIS — R07.9 CHEST PAIN: ICD-10-CM

## 2024-04-05 PROCEDURE — 75571 CT HRT W/O DYE W/CA TEST: CPT | Mod: TC

## 2024-04-05 PROCEDURE — 75571 CT HRT W/O DYE W/CA TEST: CPT | Mod: 26,,, | Performed by: RADIOLOGY

## 2024-04-08 ENCOUNTER — PATIENT MESSAGE (OUTPATIENT)
Dept: CARDIOLOGY | Facility: CLINIC | Age: 69
End: 2024-04-08
Payer: MEDICARE

## 2024-04-09 ENCOUNTER — TELEPHONE (OUTPATIENT)
Dept: FAMILY MEDICINE | Facility: CLINIC | Age: 69
End: 2024-04-09
Payer: MEDICARE

## 2024-04-09 ENCOUNTER — TELEPHONE (OUTPATIENT)
Dept: GASTROENTEROLOGY | Facility: CLINIC | Age: 69
End: 2024-04-09
Payer: MEDICARE

## 2024-04-09 DIAGNOSIS — R91.8 OTHER NONSPECIFIC ABNORMAL FINDING OF LUNG FIELD: Primary | ICD-10-CM

## 2024-04-09 DIAGNOSIS — R91.8 PULMONARY NODULES: ICD-10-CM

## 2024-04-09 RX ORDER — ROSUVASTATIN CALCIUM 40 MG/1
40 TABLET, COATED ORAL NIGHTLY
Qty: 90 TABLET | Refills: 3 | Status: SHIPPED | OUTPATIENT
Start: 2024-04-09 | End: 2024-04-10

## 2024-04-09 NOTE — TELEPHONE ENCOUNTER
----- Message from Yasmeen Sidhu sent at 4/9/2024 12:00 PM CDT -----  Contact: [phone]  PATIENTCALL     Pt is calling to speak with someone in provider office regarding she wants to see if she can see the doctor sooner than June 19 she is have issues with her acid reflex and not feeling good. She is asking for a return call please call.

## 2024-04-09 NOTE — TELEPHONE ENCOUNTER
I sent a patient message as demonstrated.  Does need a repeat CT chest in 3 months    Medications Ordered This Encounter   Medications    rosuvastatin (CRESTOR) 40 MG Tab     Sig: Take 1 tablet (40 mg total) by mouth every evening.     Dispense:  90 tablet     Refill:  3     Orders Placed This Encounter   Procedures    CT Chest Without Contrast

## 2024-04-09 NOTE — TELEPHONE ENCOUNTER
----- Message from Robert Louis sent at 4/9/2024 10:34 AM CDT -----  .Type:  Needs Medical Advice    Who Called: pt    Would the patient rather a call back or a response via [facility]? Call back  Best Call Back Number: [phone]  Additional Information:     Pt stated she would like a call back because she is still having pain in chest and back

## 2024-04-09 NOTE — TELEPHONE ENCOUNTER
Returned patient's call. She said that she is awaiting a call from cardiology regarding her CT results. However, she said that she saw the results on the portal and saw something about a spot on her lung. She asked that you review her results as well. She said that the pain she has had is in the chest and her back and that she is worried that it is because of a spot on her lung. She denies any other symptoms and said that it feels like a burning pain in her chest and back that is intermittent and that she mostly notices when she's in bed at night.

## 2024-04-10 RX ORDER — DOXYCYCLINE HYCLATE 100 MG
100 TABLET ORAL 2 TIMES DAILY
Qty: 14 TABLET | Refills: 0 | Status: SHIPPED | OUTPATIENT
Start: 2024-04-10 | End: 2024-04-17

## 2024-04-10 RX ORDER — PRAVASTATIN SODIUM 80 MG/1
80 TABLET ORAL DAILY
Qty: 90 TABLET | Refills: 3 | Status: SHIPPED | OUTPATIENT
Start: 2024-04-10 | End: 2024-06-19

## 2024-04-11 DIAGNOSIS — R07.9 CHEST PAIN, UNSPECIFIED TYPE: Primary | ICD-10-CM

## 2024-04-15 ENCOUNTER — OFFICE VISIT (OUTPATIENT)
Dept: GASTROENTEROLOGY | Facility: CLINIC | Age: 69
End: 2024-04-15
Payer: MEDICARE

## 2024-04-15 ENCOUNTER — TELEPHONE (OUTPATIENT)
Dept: ENDOSCOPY | Facility: HOSPITAL | Age: 69
End: 2024-04-15
Payer: MEDICARE

## 2024-04-15 VITALS
BODY MASS INDEX: 32.72 KG/M2 | HEART RATE: 74 BPM | SYSTOLIC BLOOD PRESSURE: 158 MMHG | WEIGHT: 173.31 LBS | DIASTOLIC BLOOD PRESSURE: 80 MMHG | HEIGHT: 61 IN

## 2024-04-15 DIAGNOSIS — K21.9 GASTROESOPHAGEAL REFLUX DISEASE, UNSPECIFIED WHETHER ESOPHAGITIS PRESENT: Primary | ICD-10-CM

## 2024-04-15 PROCEDURE — 4010F ACE/ARB THERAPY RXD/TAKEN: CPT | Mod: CPTII,GC,S$GLB, | Performed by: INTERNAL MEDICINE

## 2024-04-15 PROCEDURE — 99999 PR PBB SHADOW E&M-EST. PATIENT-LVL IV: CPT | Mod: PBBFAC,GC,, | Performed by: STUDENT IN AN ORGANIZED HEALTH CARE EDUCATION/TRAINING PROGRAM

## 2024-04-15 PROCEDURE — 3079F DIAST BP 80-89 MM HG: CPT | Mod: CPTII,GC,S$GLB, | Performed by: INTERNAL MEDICINE

## 2024-04-15 PROCEDURE — 3077F SYST BP >= 140 MM HG: CPT | Mod: CPTII,GC,S$GLB, | Performed by: INTERNAL MEDICINE

## 2024-04-15 PROCEDURE — 3288F FALL RISK ASSESSMENT DOCD: CPT | Mod: CPTII,GC,S$GLB, | Performed by: INTERNAL MEDICINE

## 2024-04-15 PROCEDURE — 99214 OFFICE O/P EST MOD 30 MIN: CPT | Mod: GC,S$GLB,, | Performed by: INTERNAL MEDICINE

## 2024-04-15 PROCEDURE — 3008F BODY MASS INDEX DOCD: CPT | Mod: CPTII,GC,S$GLB, | Performed by: INTERNAL MEDICINE

## 2024-04-15 PROCEDURE — 1126F AMNT PAIN NOTED NONE PRSNT: CPT | Mod: CPTII,GC,S$GLB, | Performed by: INTERNAL MEDICINE

## 2024-04-15 PROCEDURE — 1159F MED LIST DOCD IN RCRD: CPT | Mod: CPTII,GC,S$GLB, | Performed by: INTERNAL MEDICINE

## 2024-04-15 PROCEDURE — 1101F PT FALLS ASSESS-DOCD LE1/YR: CPT | Mod: CPTII,GC,S$GLB, | Performed by: INTERNAL MEDICINE

## 2024-04-15 RX ORDER — PANTOPRAZOLE SODIUM 40 MG/1
40 TABLET, DELAYED RELEASE ORAL 2 TIMES DAILY
Qty: 60 TABLET | Refills: 11 | Status: SHIPPED | OUTPATIENT
Start: 2024-04-15 | End: 2024-06-18

## 2024-04-15 NOTE — TELEPHONE ENCOUNTER
"----- Message from Reilly Uribe MD sent at 4/15/2024  3:51 PM CDT -----  Procedure: EGD    Diagnosis: GERD    Procedure Timing: Within 4 weeks (Urgent)    #If within 4 weeks selected, please baylee as high priority#    #If greater than 12 weeks, please select "5-12 weeks" and delay sending until 3 months prior to requested date#     Provider: Any endoscopist    Location: No Preference    Additional Scheduling Information: No scheduling concerns    Prep Specifications:N/A    Is the patient taking a GLP-1 Agonist:no    Have you attached a patient to this message: yes  "

## 2024-04-15 NOTE — PROGRESS NOTES
Gastroenterology Clinic    Reason for visit: There were no encounter diagnoses.  Referring Provider/PCP: Froylan Eckert MD    History of Present Illness:  Ms June is a 69yo PMHx HTN, HLD, GERD presents to [facility] on 04/15 as new patient for reflux.    Currently taking protonix 40mg daily. No prior EGD. Prior colonoscopy 2022 with recommended repeat in 5 years.     Reports one month of worsening heartburn--takes protonix 40mg nightly now. No melena, weightloss, dysphagia, abdominal pain.     Physical Exam:  Constitutional:  not in acute distress and well developed  HENT: Head: Normal, normocephalic, atraumatic.  Eyes: conjunctiva clear and sclera nonicteric  GI: soft, non-tender, without masses or organomegaly  Skin: normal color  Neurological: alert, oriented x3  Psychiatric: mood and affect are within normal limits, pt is a good historian; no memory problems were noted    Laboratory:  Lab Results   Component Value Date/Time    HGB 14.0 02/20/2024 12:09 PM    HGB 13.7 07/14/2023 07:12 AM    AST 25 02/20/2024 12:09 PM    AST 22 07/14/2023 07:12 AM    ALT 33 02/20/2024 12:09 PM    ALT 26 07/14/2023 07:12 AM    BILITOT 0.6 02/20/2024 12:09 PM    BILITOT 0.4 07/14/2023 07:12 AM     Reviewed. See HPI.    Imaging:  See HPI.    Endoscopy:  See HPI    Assessment:  69yo PMHx HTN, HLD, GERD presents to [facility] on 04/15 as new patient for reflux.    Problems:  GERD    Plan:  EGD  Protonix 40mg BID  No follow-ups on file.    Reilly Uribe MD  Gastroenterology and Hepatology    No orders of the defined types were placed in this encounter.

## 2024-04-15 NOTE — TELEPHONE ENCOUNTER
Ma spoke with pt after orders was place to schedule procedure , Pt states she wants to wait until she has her PET scan , Pt states she will call back number provided

## 2024-04-16 ENCOUNTER — PATIENT MESSAGE (OUTPATIENT)
Dept: GASTROENTEROLOGY | Facility: CLINIC | Age: 69
End: 2024-04-16
Payer: MEDICARE

## 2024-05-04 ENCOUNTER — HOSPITAL ENCOUNTER (EMERGENCY)
Facility: HOSPITAL | Age: 69
Discharge: HOME | End: 2024-05-04
Attending: EMERGENCY MEDICINE
Payer: MEDICARE

## 2024-05-04 VITALS
OXYGEN SATURATION: 96 % | BODY MASS INDEX: 32.1 KG/M2 | WEIGHT: 170 LBS | SYSTOLIC BLOOD PRESSURE: 130 MMHG | TEMPERATURE: 98 F | DIASTOLIC BLOOD PRESSURE: 74 MMHG | HEIGHT: 61 IN | HEART RATE: 71 BPM | RESPIRATION RATE: 16 BRPM

## 2024-05-04 DIAGNOSIS — K21.9 GASTROESOPHAGEAL REFLUX DISEASE, UNSPECIFIED WHETHER ESOPHAGITIS PRESENT: Primary | ICD-10-CM

## 2024-05-04 DIAGNOSIS — R07.9 CHEST PAIN: ICD-10-CM

## 2024-05-04 LAB
ALBUMIN SERPL BCP-MCNC: 4.1 G/DL (ref 3.5–5.2)
ALP SERPL-CCNC: 103 U/L (ref 55–135)
ALT SERPL W/O P-5'-P-CCNC: 19 U/L (ref 10–44)
ANION GAP SERPL CALC-SCNC: 7 MMOL/L (ref 8–16)
AST SERPL-CCNC: 18 U/L (ref 10–40)
BASOPHILS # BLD AUTO: 0.02 K/UL (ref 0–0.2)
BASOPHILS NFR BLD: 0.3 % (ref 0–1.9)
BILIRUB SERPL-MCNC: 0.8 MG/DL (ref 0.1–1)
BNP SERPL-MCNC: 68 PG/ML (ref 0–99)
BUN SERPL-MCNC: 10 MG/DL (ref 8–23)
BUN SERPL-MCNC: 9 MG/DL (ref 6–30)
CALCIUM SERPL-MCNC: 9.9 MG/DL (ref 8.7–10.5)
CHLORIDE SERPL-SCNC: 104 MMOL/L (ref 95–110)
CHLORIDE SERPL-SCNC: 105 MMOL/L (ref 95–110)
CO2 SERPL-SCNC: 28 MMOL/L (ref 23–29)
CREAT SERPL-MCNC: 0.7 MG/DL (ref 0.5–1.4)
CREAT SERPL-MCNC: 0.8 MG/DL (ref 0.5–1.4)
DIFFERENTIAL METHOD BLD: NORMAL
EOSINOPHIL # BLD AUTO: 0 K/UL (ref 0–0.5)
EOSINOPHIL NFR BLD: 0.7 % (ref 0–8)
ERYTHROCYTE [DISTWIDTH] IN BLOOD BY AUTOMATED COUNT: 12.3 % (ref 11.5–14.5)
EST. GFR  (NO RACE VARIABLE): >60 ML/MIN/1.73 M^2
GLUCOSE SERPL-MCNC: 94 MG/DL (ref 70–110)
GLUCOSE SERPL-MCNC: 99 MG/DL (ref 70–110)
HCT VFR BLD AUTO: 41.7 % (ref 37–48.5)
HCT VFR BLD CALC: 42 %PCV (ref 36–54)
HCV AB SERPL QL IA: NORMAL
HGB BLD-MCNC: 13.6 G/DL (ref 12–16)
HIV 1+2 AB+HIV1 P24 AG SERPL QL IA: NORMAL
IMM GRANULOCYTES # BLD AUTO: 0.01 K/UL (ref 0–0.04)
IMM GRANULOCYTES NFR BLD AUTO: 0.2 % (ref 0–0.5)
LYMPHOCYTES # BLD AUTO: 2.4 K/UL (ref 1–4.8)
LYMPHOCYTES NFR BLD: 42.3 % (ref 18–48)
MCH RBC QN AUTO: 30.5 PG (ref 27–31)
MCHC RBC AUTO-ENTMCNC: 32.6 G/DL (ref 32–36)
MCV RBC AUTO: 94 FL (ref 82–98)
MONOCYTES # BLD AUTO: 0.5 K/UL (ref 0.3–1)
MONOCYTES NFR BLD: 8.7 % (ref 4–15)
NEUTROPHILS # BLD AUTO: 2.8 K/UL (ref 1.8–7.7)
NEUTROPHILS NFR BLD: 47.8 % (ref 38–73)
NRBC BLD-RTO: 0 /100 WBC
PLATELET # BLD AUTO: 264 K/UL (ref 150–450)
PMV BLD AUTO: 10.8 FL (ref 9.2–12.9)
POC CARDIAC TROPONIN I: 0 NG/ML (ref 0–0.08)
POC IONIZED CALCIUM: 1.25 MMOL/L (ref 1.06–1.42)
POC TCO2 (MEASURED): 27 MMOL/L (ref 23–29)
POTASSIUM BLD-SCNC: 3.7 MMOL/L (ref 3.5–5.1)
POTASSIUM SERPL-SCNC: 3.6 MMOL/L (ref 3.5–5.1)
PROT SERPL-MCNC: 7.8 G/DL (ref 6–8.4)
RBC # BLD AUTO: 4.46 M/UL (ref 4–5.4)
SAMPLE: NORMAL
SAMPLE: NORMAL
SODIUM BLD-SCNC: 141 MMOL/L (ref 136–145)
SODIUM SERPL-SCNC: 140 MMOL/L (ref 136–145)
TROPONIN I SERPL DL<=0.01 NG/ML-MCNC: <0.006 NG/ML (ref 0–0.03)
TROPONIN I SERPL DL<=0.01 NG/ML-MCNC: <0.006 NG/ML (ref 0–0.03)
WBC # BLD AUTO: 5.75 K/UL (ref 3.9–12.7)

## 2024-05-04 PROCEDURE — 25000003 PHARM REV CODE 250: Performed by: PHYSICIAN ASSISTANT

## 2024-05-04 PROCEDURE — 83880 ASSAY OF NATRIURETIC PEPTIDE: CPT | Performed by: PHYSICIAN ASSISTANT

## 2024-05-04 PROCEDURE — 84484 ASSAY OF TROPONIN QUANT: CPT | Mod: 91 | Performed by: PHYSICIAN ASSISTANT

## 2024-05-04 PROCEDURE — 87389 HIV-1 AG W/HIV-1&-2 AB AG IA: CPT | Performed by: PHYSICIAN ASSISTANT

## 2024-05-04 PROCEDURE — 93005 ELECTROCARDIOGRAM TRACING: CPT

## 2024-05-04 PROCEDURE — 85025 COMPLETE CBC W/AUTO DIFF WBC: CPT | Performed by: PHYSICIAN ASSISTANT

## 2024-05-04 PROCEDURE — 99285 EMERGENCY DEPT VISIT HI MDM: CPT | Mod: 25

## 2024-05-04 PROCEDURE — 82308 ASSAY OF CALCITONIN: CPT

## 2024-05-04 PROCEDURE — 80047 BASIC METABLC PNL IONIZED CA: CPT

## 2024-05-04 PROCEDURE — 86803 HEPATITIS C AB TEST: CPT | Performed by: PHYSICIAN ASSISTANT

## 2024-05-04 PROCEDURE — 80053 COMPREHEN METABOLIC PANEL: CPT | Performed by: PHYSICIAN ASSISTANT

## 2024-05-04 PROCEDURE — 93010 ELECTROCARDIOGRAM REPORT: CPT | Mod: ,,, | Performed by: INTERNAL MEDICINE

## 2024-05-04 RX ORDER — NITROGLYCERIN 0.4 MG/1
0.4 TABLET SUBLINGUAL
Status: DISCONTINUED | OUTPATIENT
Start: 2024-05-04 | End: 2024-05-04

## 2024-05-04 RX ORDER — ALUMINUM HYDROXIDE, MAGNESIUM HYDROXIDE, AND SIMETHICONE 1200; 120; 1200 MG/30ML; MG/30ML; MG/30ML
30 SUSPENSION ORAL ONCE
Status: COMPLETED | OUTPATIENT
Start: 2024-05-04 | End: 2024-05-04

## 2024-05-04 RX ORDER — ACETAMINOPHEN 500 MG
1000 TABLET ORAL
Status: DISCONTINUED | OUTPATIENT
Start: 2024-05-04 | End: 2024-05-04 | Stop reason: HOSPADM

## 2024-05-04 RX ORDER — ASPIRIN 325 MG
325 TABLET ORAL
Status: COMPLETED | OUTPATIENT
Start: 2024-05-04 | End: 2024-05-04

## 2024-05-04 RX ORDER — LIDOCAINE HYDROCHLORIDE 20 MG/ML
15 SOLUTION OROPHARYNGEAL ONCE
Status: COMPLETED | OUTPATIENT
Start: 2024-05-04 | End: 2024-05-04

## 2024-05-04 RX ADMIN — LIDOCAINE HYDROCHLORIDE 15 ML: 20 SOLUTION OROPHARYNGEAL at 11:05

## 2024-05-04 RX ADMIN — ALUMINUM HYDROXIDE, MAGNESIUM HYDROXIDE, AND SIMETHICONE 30 ML: 200; 200; 20 SUSPENSION ORAL at 11:05

## 2024-05-04 RX ADMIN — ASPIRIN 325 MG ORAL TABLET 325 MG: 325 PILL ORAL at 10:05

## 2024-05-04 NOTE — ED PROVIDER NOTES
"Encounter Date: 5/4/2024       History     Chief Complaint   Patient presents with    Chest Pain     Chest pain that radiates into left back and neck. Yesterday "it was bothering her all through the night". Reports pain with belching and coughing. Denies cardiac hx. Has appt at end of the month.      68 y.o. female with significant past medical history of HTN, HLD on statin, GERD presented to the ER complaining of chest pain x months.  Pt reports mid to L sided CP described as burning and pulling with radiation to the back increasing in frequency in severity.  4 days prior to arrival she had pain under R breast in the middle of the night for 4 hrs.  24 hrs prior to admission in the morning pt had same L sided CP, pain with belching, pain with coughing (nonproductive).  +globus sensation, pt has been seeing GI and doubled protonix since 4/15.  The pain is described as burning, sharp.  Pt reports the pain aggravated by coughing and palpation of chest.  Chest pain is alleviated by none.  The pain Is reproducible.  Pt denies nausea, vomiting, SOB, diaphoresis, or palpitations.  Pt denies any trauma or heavy lifting.  Pt has family history of heart disease (mother and father, 3 brothers-in 60s, 2 of the brothers had bypass).  Pt denies fever, chills, SOB, cough, vocal changes, leg swelling or pain, fatigue, weakness, confusion, syncope.  No history of smoking.  She is scheduled for cardiac PET on 5/28/24.  She had negative nuclear stress test in 2022.  She was also treated for pneumonia with doxy after review of CT cardiac scoring on 4/5 "A cluster of pulmonary micro nodules in the left lung, likely representing infectious/inflammatory process of the airways."               Review of patient's allergies indicates:   Allergen Reactions    Adhesive tape-silicones     Pcn [penicillins]      Past Medical History:   Diagnosis Date    Abnormal Pap smear of cervix     Colon polyp     Condition not found     h/o abnl vaginal " cuff biopsy-mild dysplasia    Dysplasia of vagina     vaginal cuff biopsy    Genital herpes, unspecified     GERD (gastroesophageal reflux disease)     HTN (hypertension)     HTN (hypertension) 02/26/2024    Hyperlipidemia     Insulinoma     s/p resection    Special screening for malignant neoplasms, colon 07/06/2015     Past Surgical History:   Procedure Laterality Date    COLONOSCOPY N/A 6/8/2017    Procedure: COLONOSCOPY;  Surgeon: Chadd Davis MD;  Location: [address]);  Service: Endoscopy;  Laterality: N/A;    COLONOSCOPY W/ POLYPECTOMY      HYSTERECTOMY      fibroids    insulinoma resection      SHOULDER ARTHROSCOPY Left 9/23/2015    Dr Rm      Family History   Problem Relation Name Age of Onset    Heart disease Mother      Hypertension Mother      Diabetes Mother      Hyperlipidemia Mother      Stroke Father age 80     Diabetes Father age 80     Heart disease Father age 80     Cancer Sister          breast cancer    Diabetes Sister      Breast cancer Sister      Cancer Maternal Aunt          ovarian cancer    Diabetes Brother      Diabetes Sister      Heart disease Brother       Social History     Tobacco Use    Smoking status: Never    Smokeless tobacco: Never   Substance Use Topics    Alcohol use: No    Drug use: No     Review of Systems   Constitutional:  Negative for appetite change, chills, diaphoresis, fatigue and fever.   HENT:  Negative for congestion and voice change.         Globus sensation   Eyes:  Negative for visual disturbance.   Respiratory:  Positive for cough (nonproductive). Negative for shortness of breath and wheezing.    Cardiovascular:  Positive for chest pain. Negative for palpitations and leg swelling.   Gastrointestinal:  Negative for abdominal pain, constipation and diarrhea.   Genitourinary:  Negative for dysuria, flank pain and hematuria.   Musculoskeletal:  Negative for arthralgias, back pain and myalgias.   Skin:  Negative for color change and wound.    Neurological:  Negative for dizziness, seizures, syncope, facial asymmetry, speech difficulty, weakness, light-headedness and headaches.   Hematological:  Negative for adenopathy. Does not bruise/bleed easily.   Psychiatric/Behavioral:  Negative for confusion and sleep disturbance.        Physical Exam     Initial Vitals [05/04/24 0919]   BP Pulse Resp Temp SpO2   (!) 149/68 72 20 98.1 °F (36.7 °C) 98 %      MAP       --         Physical Exam    Nursing note and vitals reviewed.  Constitutional: She appears well-developed and well-nourished.   HENT:   Head: Normocephalic and atraumatic.   Eyes: EOM are normal. Pupils are equal, round, and reactive to light.   Neck: Neck supple.   Normal range of motion.  Cardiovascular:  Normal rate and regular rhythm.           Pulmonary/Chest: Breath sounds normal. She has no wheezes. She exhibits tenderness (mid to L chest pain).   Abdominal: There is no abdominal tenderness.   No right CVA tenderness.  No left CVA tenderness.   Musculoskeletal:         General: No tenderness or edema.      Cervical back: Normal range of motion and neck supple.      Comments: L trap tenderness to palpation     Neurological: She is alert and oriented to person, place, and time. She has normal strength. No cranial nerve deficit or sensory deficit.   Skin: Skin is warm and dry.         ED Course   Procedures  Labs Reviewed   COMPREHENSIVE METABOLIC PANEL - Abnormal; Notable for the following components:       Result Value    Anion Gap 7 (*)     All other components within normal limits    Narrative:     Release to patient->Immediate   HIV 1 / 2 ANTIBODY    Narrative:     Release to patient->Immediate   HEPATITIS C ANTIBODY    Narrative:     Release to patient->Immediate   CBC W/ AUTO DIFFERENTIAL    Narrative:     Release to patient->Immediate   TROPONIN I    Narrative:     Release to patient->Immediate   B-TYPE NATRIURETIC PEPTIDE    Narrative:     Release to patient->Immediate   TROPONIN ISTAT    TROPONIN I   ISTAT PROCEDURE   POCT TROPONIN   POCT TROPONIN   ISTAT CHEM8     EKG Readings: (Independently Interpreted)   Initial Reading: No STEMI. Rhythm: Normal Sinus Rhythm.       Imaging Results              X-Ray Chest AP Portable (Final result)  Result time 05/04/24 10:28:59      Final result by Heather Sy MD (05/04/24 10:28:59)                   Impression:      No acute abnormality.      Electronically signed by: Heather Sy MD  Date:    05/04/2024  Time:    10:28               Narrative:    EXAMINATION:  XR CHEST AP PORTABLE    CLINICAL HISTORY:  Chest Pain;    TECHNIQUE:  Single frontal view of the chest was performed.    COMPARISON:  02/22/2024    FINDINGS:  The lungs are clear, with normal appearance of pulmonary vasculature and no pleural effusion or pneumothorax.    The cardiac silhouette is normal in size. The hilar and mediastinal contours are unremarkable.    Bones are intact.                                       Medications   acetaminophen tablet 1,000 mg (1,000 mg Oral Not Given 5/4/24 1029)   aspirin tablet 325 mg (325 mg Oral Given 5/4/24 1029)   aluminum-magnesium hydroxide-simethicone 200-200-20 mg/5 mL suspension 30 mL (30 mLs Oral Given 5/4/24 1113)     And   LIDOcaine viscous HCl 2% oral solution 15 mL (15 mLs Oral Given 5/4/24 1113)     Medical Decision Making  68 y.o. year old female presenting with chest pain since February however over the past 24 hrs has increased in severity and frequency.    On exam patient is afebrile and nontoxic. HEENT exam normal. Heart rate and rhythm are regular. Lungs with clear breath sounds throughout. Abdomen is soft, nontender. No edema.  Tenderness to palpation of left chest wall and left upper trapezius.    DDx includes but is not limited to MSK strain, GERD, unresolved URI or pneumonia.  Low suspicion for ACS or PE.    ED workup reveals EKG NSR, No STEMI.  Troponin <0.006.  CBC unremarkable.  CMP unremarkable.  BNP WNL, 68 in  comparison to 48 previously.  CXR unremarkable.  HEART Score 5.      Pt given aspirin and GI cocktail.  She refused tylenol for MSK pain.    Upon reassessment pain resolved with GI cocktail.    Second troponin negative.  Plan keep cardiac PET 5/28, discussed with Dr. Murillo who is agreeable to discharge.  Continue protonix bid it was recently increased so may take more time to see improvement with bid dosing.    Discussed findings and plan with patient who verbalized understanding and agrees with the plan and course of treatment. Return to ED precautions discussed. Patient is stable for discharge. I discussed the care of this patient with my supervising physician.     Amount and/or Complexity of Data Reviewed  Labs: ordered.  Radiology: ordered.    Risk  OTC drugs.  Prescription drug management.                                      Clinical Impression:  Final diagnoses:  [R07.9] Chest pain  [K21.9] Gastroesophageal reflux disease, unspecified whether esophagitis present (Primary)                 Tara Gonzalez PA-C  05/04/24 2284

## 2024-05-04 NOTE — ED TRIAGE NOTES
"Rachael June, a 68 y.o. female presents to the ED w/ complaint of CP that started yesterday morning, left sided pain described as stabbing in back, pulling/burning in chest    Triage note:  Chief Complaint   Patient presents with    Chest Pain     Chest pain that radiates into left back and neck. Yesterday "it was bothering her all through the night". Reports pain with belching and coughing. Denies cardiac hx. Has appt at end of the month.      Review of patient's allergies indicates:   Allergen Reactions    Adhesive tape-silicones     Pcn [penicillins]      Past Medical History:   Diagnosis Date    Abnormal Pap smear of cervix     Colon polyp     Condition not found     h/o abnl vaginal cuff biopsy-mild dysplasia    Dysplasia of vagina     vaginal cuff biopsy    Genital herpes, unspecified     GERD (gastroesophageal reflux disease)     HTN (hypertension)     HTN (hypertension) 02/26/2024    Hyperlipidemia     Insulinoma     s/p resection    Special screening for malignant neoplasms, colon 07/06/2015         APPEARANCE: awake and alert in NAD. PAIN  9/10  SKIN: warm, dry and intact. No breakdown or bruising.  MUSCULOSKELETAL: Patient moving all extremities spontaneously, no obvious swelling or deformities noted. Ambulates independently.  RESPIRATORY: Denies shortness of breath.Respirations unlabored.   CARDIAC: Endorses CP, 2+ distal pulses; no peripheral edema  ABDOMEN: S/ND/NT, Denies nausea  : voids spontaneously, denies difficulty  Neurologic: AAO x 4; follows commands equal strength in all extremities; denies numbness/tingling. Denies dizziness    "

## 2024-05-04 NOTE — ED NOTES
I-STAT Chem-8+ Results:   Value Reference Range   Sodium 141 136-145 mmol/L   Potassium  3.7 3.5-5.1 mmol/L   Chloride 104  mmol/L   Ionized Calcium 1.25 1.06-1.42 mmol/L   CO2 (measured) 27 23-29 mmol/L   Glucose 99  mg/dL   BUN 9 6-30 mg/dL   Creatinine 0.7 0.5-1.4 mg/dL   Hematocrit 42 36-54%

## 2024-05-04 NOTE — ED NOTES
Pt states she wants to wait and see what her labs are like before she takes anything for pain. Refused tylenol and gi cocktail

## 2024-05-05 LAB
OHS QRS DURATION: 88 MS
OHS QTC CALCULATION: 410 MS

## 2024-05-06 ENCOUNTER — TELEPHONE (OUTPATIENT)
Dept: GASTROENTEROLOGY | Facility: CLINIC | Age: 69
End: 2024-05-06
Payer: MEDICARE

## 2024-05-06 NOTE — TELEPHONE ENCOUNTER
----- Message from Liana Hill sent at 5/6/2024  8:48 AM CDT -----  Regarding: Pt wld like to sche an appt  Contact: [phone]  Name of Who is Calling:JOSE MAC [[id number]]        What is the request in detail:Pt called wld like to sche an appt doesn't want to see previous Dr. Please advise         Can the clinic reply by [facility] [place] [facility]:No        What Number to Call Back if not in [facility]: Telephone Information:  Mobile          [phone]

## 2024-05-07 ENCOUNTER — PATIENT MESSAGE (OUTPATIENT)
Dept: FAMILY MEDICINE | Facility: CLINIC | Age: 69
End: 2024-05-07
Payer: MEDICARE

## 2024-05-07 DIAGNOSIS — K21.9 GASTROESOPHAGEAL REFLUX DISEASE, UNSPECIFIED WHETHER ESOPHAGITIS PRESENT: Primary | ICD-10-CM

## 2024-05-07 NOTE — TELEPHONE ENCOUNTER
See patient message. Let me know if you need me to schedule her an appointment, let me know if you want to use a 7 day spot.

## 2024-05-16 ENCOUNTER — OFFICE VISIT (OUTPATIENT)
Dept: GASTROENTEROLOGY | Facility: CLINIC | Age: 69
End: 2024-05-16
Payer: MEDICARE

## 2024-05-16 VITALS
BODY MASS INDEX: 32.12 KG/M2 | HEART RATE: 80 BPM | SYSTOLIC BLOOD PRESSURE: 122 MMHG | WEIGHT: 170 LBS | DIASTOLIC BLOOD PRESSURE: 73 MMHG

## 2024-05-16 DIAGNOSIS — K21.9 GASTROESOPHAGEAL REFLUX DISEASE, UNSPECIFIED WHETHER ESOPHAGITIS PRESENT: ICD-10-CM

## 2024-05-16 PROCEDURE — 1126F AMNT PAIN NOTED NONE PRSNT: CPT | Mod: CPTII,S$GLB,, | Performed by: INTERNAL MEDICINE

## 2024-05-16 PROCEDURE — 99999 PR PBB SHADOW E&M-EST. PATIENT-LVL IV: CPT | Mod: PBBFAC,,, | Performed by: INTERNAL MEDICINE

## 2024-05-16 PROCEDURE — 3008F BODY MASS INDEX DOCD: CPT | Mod: CPTII,S$GLB,, | Performed by: INTERNAL MEDICINE

## 2024-05-16 PROCEDURE — 1101F PT FALLS ASSESS-DOCD LE1/YR: CPT | Mod: CPTII,S$GLB,, | Performed by: INTERNAL MEDICINE

## 2024-05-16 PROCEDURE — 99214 OFFICE O/P EST MOD 30 MIN: CPT | Mod: S$GLB,,, | Performed by: INTERNAL MEDICINE

## 2024-05-16 PROCEDURE — 3078F DIAST BP <80 MM HG: CPT | Mod: CPTII,S$GLB,, | Performed by: INTERNAL MEDICINE

## 2024-05-16 PROCEDURE — 1159F MED LIST DOCD IN RCRD: CPT | Mod: CPTII,S$GLB,, | Performed by: INTERNAL MEDICINE

## 2024-05-16 PROCEDURE — 3074F SYST BP LT 130 MM HG: CPT | Mod: CPTII,S$GLB,, | Performed by: INTERNAL MEDICINE

## 2024-05-16 PROCEDURE — 4010F ACE/ARB THERAPY RXD/TAKEN: CPT | Mod: CPTII,S$GLB,, | Performed by: INTERNAL MEDICINE

## 2024-05-16 PROCEDURE — 3288F FALL RISK ASSESSMENT DOCD: CPT | Mod: CPTII,S$GLB,, | Performed by: INTERNAL MEDICINE

## 2024-05-16 NOTE — LETTER
May 16, 2024      [facility]  [address]  [place] [address]  Phone: [phone]       Patient: Rachael June   YOB: 1955  Date of Visit: 05/16/2024    To Whom It May Concern:    Piper June  was at [facility] on 05/16/2024. The patient may return to work/school on 5/16/2024 with no restrictions. If you have any questions or concerns, or if I can be of further assistance, please do not hesitate to contact me.    Sincerely,    Shanique Waldrop MA

## 2024-05-16 NOTE — PROGRESS NOTES
Reason for visit: GERD    HPI: Ms June is a 68 year old lady with heartburn. Medical history includes HTN, HLP, h/o pancreatic insulinoma resection and GERD. Was recently seen at [facility] a month ago as a new patient for reflux. Currently taking Protonix 40mg daily. No prior EGD. Prior colonoscopy 2022 with recommended repeat in 5 years. For the past 2 months she has been noticing worsening heartburn.  Denies any dysphagia, odynophagia, nausea or vomiting. Rare occasions of acid regurgitation. No abdominal pains, changes in bowel pattern, blood/ mucus in stool or unintentional weight loss. No melena or maroon stools. No recent changes in diet or medications. No family history of IBD, Celiac disease or GI malignancy. No regular NSAIDs, alcohol or tobacco use. No recent antibiotic use, travels or sick contacts. No prior history of C.diff.  After the GI consultation she was asked to increase PPI to bid dosing and scheduled EGD. Was in the ER 2 weeks ago with reports of mid to L sided CP described as burning and pulling with radiation to the back increasing in frequency in severity. 4 days prior to arrival she had pain under R breast in the middle of the night for 4 hrs. 24 hrs prior to admission in the morning pt had same L sided CP, pain with belching, pain with coughing (nonproductive). +globus sensation, pt has been seeing GI and doubled protonix since 4/15. The pain is described as burning, sharp. Pt reports the pain aggravated by coughing and palpation of chest. Chest pain is alleviated by none. The pain was reproducible (tenderness mid to L chest) in the ER . ER workup revealed EKG with NSR; Troponin <0.006; CBC unremarkable; CMP unremarkable;  BNP WNL, 68 in comparison to 48 previously and  CXR unremarkable.  Pt given aspirin and GI cocktail.  She refused tylenol for MSK pain.     Upon reassessment pain resolved with GI cocktail but once she retured home her symptoms resumed. Currently off PPI  all together and only taking Phazyme bid with some relief.    Past medical, surgical, social and family history reviewed in epic    Medication allergies reviewed in epic    Review of systems:    Constitutional:  No fever, no chills, no weight loss, appetite is normal  Eyes:  No visual changes or red eyes  ENT:  No odynophagia or hoarseness of voice  Cardiovascular:  No angina or palpitation  Respiratory:  No shortness breath or wheezing  Genitourinary:  No dysuria or frequency  Musculoskeletal:  No myalgias or arthralgias; chest pain is not reproducible today upon palpation  Skin:  No pruritus or eczema  Neurologic:  No headache or seizures  Psychiatric:  No anxiety depression  Gastrointestinal:  See [facility]    Physical exam:  Vitals see epic, awake, alert, oriented x3 in no acute distress    Neck:  Supple, no carotid bruit, no cervical adenopathy  Abdomen:  Obese, soft, nontender, nondistended, no masses palpable, no hepatosplenomegaly detected, bowel sounds are normal, no ascites clinically detectable  Eyes:  Conjunctivae anicteric, not injected  ENT:  Oral mucosa moist  Cardiovascular:  S1, S2 normal, no murmurs, no gallops, no abdominal bruits heard  Respiratory:  Bilateral air entry equal, no rhonchi, no crackles, normal effort  Skin:  No palmar erythema or spider angiomata  Neurologic:  No asterixis or tremors  Psychiatric:  Affect appropriate, proper judgment, proper insight, oriented to place and time  Lower extremities:  No pedal edema    Recent labs, imaging and endoscopy reports reviewed.      Impression: Recent Chest pain and GERD- uncontrolled; less likely musculoskeletal    Recommendations:     Schedule EGD  Continue Phazyme for now  Will discuss PPI after the procedure

## 2024-05-21 ENCOUNTER — TELEPHONE (OUTPATIENT)
Dept: ENDOSCOPY | Facility: HOSPITAL | Age: 69
End: 2024-05-21
Payer: MEDICARE

## 2024-05-21 VITALS — BODY MASS INDEX: 32.1 KG/M2 | HEIGHT: 61 IN | WEIGHT: 170 LBS

## 2024-05-21 DIAGNOSIS — K21.9 GASTROESOPHAGEAL REFLUX DISEASE, UNSPECIFIED WHETHER ESOPHAGITIS PRESENT: Primary | ICD-10-CM

## 2024-05-21 NOTE — TELEPHONE ENCOUNTER
"----- Message from Leonardo Maradiaga MD sent at 2024  8:17 AM CDT -----  Regarding: Endo Case Request  Procedure: EGD    Diagnosis: GERD    Procedure Timin-12 weeks    #If within 4 weeks selected, please baylee as high priority#    #If greater than 12 weeks, please select "4-12 weeks" and delay sending until 2 months prior to requested date#     Provider: Myself    Location: [facility]    Additional Scheduling Information: No scheduling concerns    Prep Specifications:N/A    Have you attached a patient to this message: Yes  "

## 2024-05-21 NOTE — TELEPHONE ENCOUNTER
Spoke to Malena to schedule procedure(s) Upper Endoscopy (EGD)       Physician to perform procedure(s) Dr. DON Maradiaga  Date of Procedure (s) 6/26/24  Arrival Time 11:30 AM  Time of Procedure(s) 12:30 PM   Location of Procedure(s) [place] 2nd Floor  Type of Rx Prep sent to patient: Other  Instructions provided to patient via [facility]    Patient was informed on the following information and verbalized understanding. Screening questionnaire reviewed with patient and complete. If procedure requires anesthesia, a responsible adult needs to be present to accompany the patient home, patient cannot drive after receiving anesthesia. Appointment details are tentative, especially check-in time. Patient will receive a prep-op call 7 days prior to confirm check-in time for procedure. If applicable the patient should contact their pharmacy to verify Rx for procedure prep is ready for pick-up. Patient was advised to call the scheduling department at [phone] if pharmacy states no Rx is available. Patient was advised to call the endoscopy scheduling department if any questions or concerns arise.      [facility] Scheduling Department

## 2024-05-24 ENCOUNTER — TELEPHONE (OUTPATIENT)
Dept: CARDIOLOGY | Facility: HOSPITAL | Age: 69
End: 2024-05-24
Payer: MEDICARE

## 2024-05-28 ENCOUNTER — HOSPITAL ENCOUNTER (OUTPATIENT)
Dept: CARDIOLOGY | Facility: HOSPITAL | Age: 69
Discharge: HOME | End: 2024-05-28
Attending: STUDENT IN AN ORGANIZED HEALTH CARE EDUCATION/TRAINING PROGRAM
Payer: MEDICARE

## 2024-05-28 VITALS
HEIGHT: 61 IN | WEIGHT: 170 LBS | HEART RATE: 71 BPM | DIASTOLIC BLOOD PRESSURE: 91 MMHG | BODY MASS INDEX: 32.1 KG/M2 | SYSTOLIC BLOOD PRESSURE: 194 MMHG

## 2024-05-28 DIAGNOSIS — R07.9 CHEST PAIN, UNSPECIFIED TYPE: ICD-10-CM

## 2024-05-28 LAB
CFR FLOW - ANTERIOR: 2.62
CFR FLOW - INFERIOR: 2.62
CFR FLOW - LATERAL: 2.46
CFR FLOW - MAX: 3.43
CFR FLOW - MIN: 1.92
CFR FLOW - SEPTAL: 2.81
CFR FLOW - WHOLE HEART: 2.63
CV PHARM DOSE: 0.4 MG
CV STRESS BASE HR: 71 BPM
DIASTOLIC BLOOD PRESSURE: 91 MMHG
EJECTION FRACTION- HIGH: 59 %
END DIASTOLIC INDEX-HIGH: 155 ML/M2
END DIASTOLIC INDEX-LOW: 91 ML/M2
END SYSTOLIC INDEX-HIGH: 78 ML/M2
END SYSTOLIC INDEX-LOW: 40 ML/M2
NUC REST DIASTOLIC VOLUME INDEX: 68
NUC REST EJECTION FRACTION: 70
NUC REST SYSTOLIC VOLUME INDEX: 20
NUC STRESS DIASTOLIC VOLUME INDEX: 74
NUC STRESS EJECTION FRACTION: 76 %
NUC STRESS SYSTOLIC VOLUME INDEX: 18
OHS CV CPX 85 PERCENT MAX PREDICTED HEART RATE MALE: 131
OHS CV CPX MAX PREDICTED HEART RATE: 154
OHS CV CPX PATIENT IS FEMALE: 1
OHS CV CPX PATIENT IS MALE: 0
OHS CV CPX PEAK DIASTOLIC BLOOD PRESSURE: 84 MMHG
OHS CV CPX PEAK HEAR RATE: 85 BPM
OHS CV CPX PEAK RATE PRESSURE PRODUCT: NORMAL
OHS CV CPX PEAK SYSTOLIC BLOOD PRESSURE: 155 MMHG
OHS CV CPX PERCENT MAX PREDICTED HEART RATE ACHIEVED: 57
OHS CV CPX RATE PRESSURE PRODUCT PRESENTING: NORMAL
REST FLOW - ANTERIOR: 0.68 CC/MIN/G
REST FLOW - INFERIOR: 0.7 CC/MIN/G
REST FLOW - LATERAL: 0.81 CC/MIN/G
REST FLOW - MAX: 1.01 CC/MIN/G
REST FLOW - MIN: 0.41 CC/MIN/G
REST FLOW - SEPTAL: 0.62 CC/MIN/G
REST FLOW - WHOLE HEART: 0.7 CC/MIN/G
RETIRED EF AND QEF - SEE NOTES: 47 %
STRESS FLOW - ANTERIOR: 1.77 CC/MIN/G
STRESS FLOW - INFERIOR: 1.83 CC/MIN/G
STRESS FLOW - LATERAL: 1.99 CC/MIN/G
STRESS FLOW - MAX: 2.35 CC/MIN/G
STRESS FLOW - MIN: 0.87 CC/MIN/G
STRESS FLOW - SEPTAL: 1.75 CC/MIN/G
STRESS FLOW - WHOLE HEART: 1.84 CC/MIN/G
SYSTOLIC BLOOD PRESSURE: 194 MMHG

## 2024-05-28 PROCEDURE — 93017 CV STRESS TEST TRACING ONLY: CPT

## 2024-05-28 PROCEDURE — 63600175 PHARM REV CODE 636 W HCPCS: Performed by: STUDENT IN AN ORGANIZED HEALTH CARE EDUCATION/TRAINING PROGRAM

## 2024-05-28 PROCEDURE — A9555 RB82 RUBIDIUM: HCPCS | Performed by: STUDENT IN AN ORGANIZED HEALTH CARE EDUCATION/TRAINING PROGRAM

## 2024-05-28 PROCEDURE — 78431 MYOCRD IMG PET RST&STRS CT: CPT

## 2024-05-28 RX ORDER — AMINOPHYLLINE 25 MG/ML
75 INJECTION, SOLUTION INTRAVENOUS ONCE
Status: COMPLETED | OUTPATIENT
Start: 2024-05-28 | End: 2024-05-28

## 2024-05-28 RX ORDER — REGADENOSON 0.08 MG/ML
0.4 INJECTION, SOLUTION INTRAVENOUS
Status: COMPLETED | OUTPATIENT
Start: 2024-05-28 | End: 2024-05-28

## 2024-05-28 RX ADMIN — RUBIDIUM CHLORIDE RB-82 23.1 MILLICURIE: 150 INJECTION, SOLUTION INTRAVENOUS at 07:05

## 2024-05-28 RX ADMIN — RUBIDIUM CHLORIDE RB-82 23 MILLICURIE: 150 INJECTION, SOLUTION INTRAVENOUS at 07:05

## 2024-05-28 RX ADMIN — REGADENOSON 0.4 MG: 0.08 INJECTION, SOLUTION INTRAVENOUS at 07:05

## 2024-05-28 RX ADMIN — AMINOPHYLLINE 75 MG: 25 INJECTION, SOLUTION INTRAVENOUS at 07:05

## 2024-06-14 ENCOUNTER — TELEPHONE (OUTPATIENT)
Dept: FAMILY MEDICINE | Facility: CLINIC | Age: 69
End: 2024-06-14
Payer: MEDICARE

## 2024-06-14 NOTE — TELEPHONE ENCOUNTER
----- Message from Chuy Dutton sent at 6/14/2024 11:48 AM CDT -----  Contact: Pt  .Type:  Sooner Apoointment Request    Caller is requesting a sooner appointment.  Caller declined first available appointment listed below.  Caller will not accept being placed on the waitlist and is requesting a message be sent to doctor.  Name of Caller:opt  When is the first available appointment?Sept  Symptoms: annual   Would the patient rather a call back or a response via [facility]?  Call back  Best Call Back Number:[phone]  Additional Information: P.t annual is due in July and she is requesting a call back from Josefa

## 2024-06-18 ENCOUNTER — OFFICE VISIT (OUTPATIENT)
Dept: FAMILY MEDICINE | Facility: CLINIC | Age: 69
End: 2024-06-18
Payer: MEDICARE

## 2024-06-18 ENCOUNTER — TELEPHONE (OUTPATIENT)
Dept: ENDOSCOPY | Facility: HOSPITAL | Age: 69
End: 2024-06-18
Payer: MEDICARE

## 2024-06-18 VITALS
BODY MASS INDEX: 32.46 KG/M2 | TEMPERATURE: 98 F | DIASTOLIC BLOOD PRESSURE: 66 MMHG | SYSTOLIC BLOOD PRESSURE: 124 MMHG | HEIGHT: 61 IN | WEIGHT: 171.94 LBS | HEART RATE: 84 BPM | OXYGEN SATURATION: 97 %

## 2024-06-18 DIAGNOSIS — I25.10 ATHEROSCLEROSIS OF CORONARY ARTERY, UNSPECIFIED VESSEL OR LESION TYPE, UNSPECIFIED WHETHER ANGINA PRESENT, UNSPECIFIED WHETHER NATIVE OR TRANSPLANTED HEART: ICD-10-CM

## 2024-06-18 DIAGNOSIS — K21.9 GASTROESOPHAGEAL REFLUX DISEASE, UNSPECIFIED WHETHER ESOPHAGITIS PRESENT: ICD-10-CM

## 2024-06-18 DIAGNOSIS — Z12.31 BREAST CANCER SCREENING BY MAMMOGRAM: ICD-10-CM

## 2024-06-18 DIAGNOSIS — E78.5 HYPERLIPIDEMIA, UNSPECIFIED HYPERLIPIDEMIA TYPE: ICD-10-CM

## 2024-06-18 DIAGNOSIS — I70.0 AORTIC ATHEROSCLEROSIS: ICD-10-CM

## 2024-06-18 DIAGNOSIS — Z00.00 ROUTINE GENERAL MEDICAL EXAMINATION AT A HEALTH CARE FACILITY: Primary | ICD-10-CM

## 2024-06-18 DIAGNOSIS — R73.03 PREDIABETES: ICD-10-CM

## 2024-06-18 DIAGNOSIS — G47.33 OSA (OBSTRUCTIVE SLEEP APNEA): ICD-10-CM

## 2024-06-18 DIAGNOSIS — A60.00 GENITAL HERPES SIMPLEX, UNSPECIFIED SITE: ICD-10-CM

## 2024-06-18 DIAGNOSIS — I10 HYPERTENSION, UNSPECIFIED TYPE: ICD-10-CM

## 2024-06-18 PROCEDURE — 1126F AMNT PAIN NOTED NONE PRSNT: CPT | Mod: CPTII,S$GLB,, | Performed by: FAMILY MEDICINE

## 2024-06-18 PROCEDURE — 1101F PT FALLS ASSESS-DOCD LE1/YR: CPT | Mod: CPTII,S$GLB,, | Performed by: FAMILY MEDICINE

## 2024-06-18 PROCEDURE — 4010F ACE/ARB THERAPY RXD/TAKEN: CPT | Mod: CPTII,S$GLB,, | Performed by: FAMILY MEDICINE

## 2024-06-18 PROCEDURE — 3078F DIAST BP <80 MM HG: CPT | Mod: CPTII,S$GLB,, | Performed by: FAMILY MEDICINE

## 2024-06-18 PROCEDURE — 99397 PER PM REEVAL EST PAT 65+ YR: CPT | Mod: S$GLB,,, | Performed by: FAMILY MEDICINE

## 2024-06-18 PROCEDURE — 3074F SYST BP LT 130 MM HG: CPT | Mod: CPTII,S$GLB,, | Performed by: FAMILY MEDICINE

## 2024-06-18 PROCEDURE — 1160F RVW MEDS BY RX/DR IN RCRD: CPT | Mod: CPTII,S$GLB,, | Performed by: FAMILY MEDICINE

## 2024-06-18 PROCEDURE — 1159F MED LIST DOCD IN RCRD: CPT | Mod: CPTII,S$GLB,, | Performed by: FAMILY MEDICINE

## 2024-06-18 PROCEDURE — 3288F FALL RISK ASSESSMENT DOCD: CPT | Mod: CPTII,S$GLB,, | Performed by: FAMILY MEDICINE

## 2024-06-18 PROCEDURE — 3008F BODY MASS INDEX DOCD: CPT | Mod: CPTII,S$GLB,, | Performed by: FAMILY MEDICINE

## 2024-06-18 PROCEDURE — 99999 PR PBB SHADOW E&M-EST. PATIENT-LVL IV: CPT | Mod: PBBFAC,,, | Performed by: FAMILY MEDICINE

## 2024-06-18 RX ORDER — MELATON/B.COHOSH/VALERIAN/HOPS 3 MG-40 MG
CAPSULE ORAL
COMMUNITY

## 2024-06-18 RX ORDER — VALACYCLOVIR HYDROCHLORIDE 1 G/1
500 TABLET, FILM COATED ORAL DAILY
Qty: 45 TABLET | Refills: 11 | Status: SHIPPED | OUTPATIENT
Start: 2024-06-18

## 2024-06-18 NOTE — PROGRESS NOTES
(Portions of this note were dictated using voice recognition software and may contain dictation related errors in spelling/grammar/syntax not found on text review)    CC:   Chief Complaint   Patient presents with    Annual Exam       HPI: 68 y.o. female          GERD: famotidine 20 mg daily .  Saw gastro on 05/16/2024.  EGD ordered for 06/26/2024.  Was on pantoprazole but felt like she did not really need it.  Currently denies any symptoms     Had gone to ED on 05/04/2024 for chest pain symptoms, believe likely GERD related.  Cardiac enzymes not elevated..  Had further cardiac testing done on 05/28/2024 with PET stress.  There was no sign of perfusion abnormality.  Has moderate diffuse coronary calcifications LAD mild calcifications RCA and moderate diffuse aortic calcifications in descending aorta.  EF was 70% at rest and 76% at stress.  On pravastatin typically for hyperlipidemia at 40 mg.  Had recommended going to Crestor 40 mg but she would reported prior intolerance secondary to myalgias in her legs, and therefore pravastatin was subsequently increased to 80 mg.  Has had elevated calcium score on labs.  Also during that calcium score CT, there was incidentally noted some tiny micro nodules less than 4 mm.  There was some consideration for inflammatory/infectious etiology.  Had questions or at the time of that study and she stated that she was having some cough and back pain at that time.  Empirically treated for CPAP with doxycycline.  Currently denies any respiratory symptoms.  Has updated chest CT coming up to follow    PreDM:  last a1c 5.7    Genital HSV: daily suppressive valtrex    AR: on flonase    Intermittent constipation takes miralax    Peripheral edema right leg/foot past few years, have had several investigations over past no prominent findings. Was tried on diuretics but not really helpful. Better in am and more prominent through day. No pain. Finds that she is sensitive to sodium and fluid  retention.     KEMAL mild: PSG 2018, doesn't use CPAP     Hx of insulinoma 2008, removed and no specific issues since.     Hypertension, on valsartan 80 mg daily .  Tried amlodipine but cause significant leg stiffness.  BP is stable            No tob or ETOH  Works for [employer] as   Takes care of granddtr    Diet: +indiscretions, +sweets, fast food 1-2 x monthly  Exercise: joined gym , exercises 3-4 times weekly    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      Past Medical History:   Diagnosis Date    Abnormal Pap smear of cervix     Colon polyp     Condition not found     h/o abnl vaginal cuff biopsy-mild dysplasia    Dysplasia of vagina     vaginal cuff biopsy    Genital herpes, unspecified     GERD (gastroesophageal reflux disease)     HTN (hypertension)     HTN (hypertension) 02/26/2024    Hyperlipidemia     Insulinoma     s/p resection    Special screening for malignant neoplasms, colon 07/06/2015       Past Surgical History:   Procedure Laterality Date    COLONOSCOPY N/A 6/8/2017    Procedure: COLONOSCOPY;  Surgeon: Chadd Davis MD;  Location: [facility] ([address] [place] [address]);  Service: Endoscopy;  Laterality: N/A;    COLONOSCOPY W/ POLYPECTOMY      HYSTERECTOMY      fibroids    insulinoma resection      SHOULDER ARTHROSCOPY Left 9/23/2015    Dr Rm        Family History   Problem Relation Name Age of Onset    Heart disease Mother      Hypertension Mother      Diabetes Mother      Hyperlipidemia Mother      Stroke Father age 80     Diabetes Father age 80     Heart disease Father age 80     Cancer Sister          breast cancer    Diabetes Sister      Breast cancer Sister      Cancer Maternal Aunt          ovarian cancer    Diabetes Brother      Diabetes Sister      Heart disease Brother         Social History     Tobacco Use    Smoking status: Never    Smokeless tobacco: Never   Substance Use Topics    Alcohol use: No    Drug use: No       Lab Results   Component Value Date    WBC 5.75 05/04/2024    HGB 13.6 05/04/2024    HCT  "42 05/04/2024    MCV 94 05/04/2024     05/04/2024    CHOL 189 07/14/2023    TRIG 52 07/14/2023    HDL 65 07/14/2023    ALT 19 05/04/2024    AST 18 05/04/2024    BILITOT 0.8 05/04/2024    ALKPHOS 103 05/04/2024     05/04/2024    K 3.6 05/04/2024     05/04/2024    CREATININE 0.8 05/04/2024    ESTGFRAFRICA >60.0 07/15/2022    EGFRNONAA >60.0 07/15/2022    CALCIUM 9.9 05/04/2024    ALBUMIN 4.1 05/04/2024    BUN 10 05/04/2024    CO2 28 05/04/2024    TSH 1.893 07/14/2023    INR 1.0 01/15/2015    HGBA1C 5.7 (H) 07/14/2023    MICALBCREAT 10.0 10/29/2008    LDLCALC 113.6 07/14/2023    GLU 94 05/04/2024    UUFSINWW21DA 35 06/06/2017       Lab Results   Component Value Date    HGBA1C 5.7 (H) 07/14/2023    HGBA1C 5.9 (H) 07/15/2022    HGBA1C 5.9 06/06/2017    HGBA1C 5.7 12/02/2016    HGBA1C 5.9 06/02/2015             Vital signs reviewed  Vitals:    06/18/24 1617   BP: 124/66   BP Location: Left arm   Patient Position: Sitting   BP Method: Large (Manual)   Pulse: 84   Temp: 97.8 °F (36.6 °C)   SpO2: 97%   Weight: 78 kg (171 lb 15.3 oz)   Height: 5' 1" (1.549 m)           Wt Readings from Last 4 Encounters:   06/18/24 78 kg (171 lb 15.3 oz)   05/28/24 77.1 kg (170 lb)   05/21/24 77.1 kg (170 lb)   05/16/24 77.1 kg (169 lb 15.6 oz)       PE:   APPEARANCE: Well nourished, well developed, in no acute distress.    HEAD: Normocephalic, atraumatic.  NECK: Supple with no cervical lymphadenopathy.  No carotid bruits, no thyromegaly  CHEST: Good inspiratory effort. Lungs clear to auscultation with no wheezes or crackles.  CARDIOVASCULAR: Normal S1, S2. No rubs, murmurs, or gallops.  ABDOMEN: Bowel sounds normal. Not distended. Soft. No tenderness or masses. No organomegaly.  EXTREMITIES:  no edema      IMPRESSION  1. Routine general medical examination at a health care facility    2. Hypertension, unspecified type    3. Atherosclerosis of coronary artery, unspecified vessel or lesion type, unspecified whether angina " present, unspecified whether native or transplanted heart    4. Aortic atherosclerosis    5. Hyperlipidemia, unspecified hyperlipidemia type    6. Prediabetes    7. Gastroesophageal reflux disease, unspecified whether esophagitis present    8. KEMAL (obstructive sleep apnea)    9. Breast cancer screening by mammogram    10. Genital herpes simplex, unspecified site                PLAN  Update labs    Blood pressure:  Stable on Diovan 80 mg daily    Monitor/reduce sodium    Continue regular exercise     GERD stable .  Up coming EGD     Dyslipidemia with coronary artery and aortic  calcifications noted on recent PET-CT.  Currently on pravastatin at 80 mg.  Will update labs, if /LDL not at goal can consider transitioning to Lipitor 80 mg if tolerated, and if not tolerated can consider adding Zetia to reach LDL goal <70     She states that she was told to stop low-dose aspirin 81 mg a day.  Denies any intolerance issues.  Would advise holding off at least until her EGD.  If no ulcerations or significant gastritis concerns, given elevated CAC/atherosclerotic disease, she can just double check with her cardiologist on risk benefit as it may be best from a preventive standpoint to beyond the low-dose aspirin plus statin for optimized cardiac risk reduction          SCREENINGS      Immunizations:   covid booster eligible  tdap 2016  PCV20 7/2022  Zoster up-to-date    Age/demographic appropriate health maintenance:  MMG   August 20, 2023, reordered.   DEXA 2022 normal.    colonoscopy 2017, internal hemorrhoids otherwise normal. Rpt April 2022 (2 polyps removed.--[facility])

## 2024-06-19 ENCOUNTER — LAB VISIT (OUTPATIENT)
Dept: LAB | Facility: HOSPITAL | Age: 69
End: 2024-06-19
Attending: FAMILY MEDICINE
Payer: MEDICARE

## 2024-06-19 ENCOUNTER — PATIENT MESSAGE (OUTPATIENT)
Dept: FAMILY MEDICINE | Facility: CLINIC | Age: 69
End: 2024-06-19
Payer: MEDICARE

## 2024-06-19 ENCOUNTER — ANESTHESIA EVENT (OUTPATIENT)
Dept: ENDOSCOPY | Facility: HOSPITAL | Age: 69
End: 2024-06-19
Payer: MEDICARE

## 2024-06-19 DIAGNOSIS — R73.03 PREDIABETES: ICD-10-CM

## 2024-06-19 DIAGNOSIS — I25.10 ATHEROSCLEROSIS OF CORONARY ARTERY, UNSPECIFIED VESSEL OR LESION TYPE, UNSPECIFIED WHETHER ANGINA PRESENT, UNSPECIFIED WHETHER NATIVE OR TRANSPLANTED HEART: ICD-10-CM

## 2024-06-19 DIAGNOSIS — I70.0 AORTIC ATHEROSCLEROSIS: ICD-10-CM

## 2024-06-19 DIAGNOSIS — E78.5 HYPERLIPIDEMIA, UNSPECIFIED HYPERLIPIDEMIA TYPE: ICD-10-CM

## 2024-06-19 DIAGNOSIS — K21.9 GASTROESOPHAGEAL REFLUX DISEASE, UNSPECIFIED WHETHER ESOPHAGITIS PRESENT: ICD-10-CM

## 2024-06-19 DIAGNOSIS — I10 HYPERTENSION, UNSPECIFIED TYPE: ICD-10-CM

## 2024-06-19 LAB
ALBUMIN SERPL BCP-MCNC: 3.9 G/DL (ref 3.5–5.2)
ALP SERPL-CCNC: 104 U/L (ref 55–135)
ALT SERPL W/O P-5'-P-CCNC: 24 U/L (ref 10–44)
ANION GAP SERPL CALC-SCNC: 8 MMOL/L (ref 8–16)
AST SERPL-CCNC: 20 U/L (ref 10–40)
BASOPHILS # BLD AUTO: 0.03 K/UL (ref 0–0.2)
BASOPHILS NFR BLD: 0.5 % (ref 0–1.9)
BILIRUB SERPL-MCNC: 0.4 MG/DL (ref 0.1–1)
BUN SERPL-MCNC: 11 MG/DL (ref 8–23)
CALCIUM SERPL-MCNC: 10.1 MG/DL (ref 8.7–10.5)
CHLORIDE SERPL-SCNC: 105 MMOL/L (ref 95–110)
CHOLEST SERPL-MCNC: 176 MG/DL (ref 120–199)
CHOLEST/HDLC SERPL: 2.7 {RATIO} (ref 2–5)
CO2 SERPL-SCNC: 27 MMOL/L (ref 23–29)
CREAT SERPL-MCNC: 0.8 MG/DL (ref 0.5–1.4)
DIFFERENTIAL METHOD BLD: ABNORMAL
EOSINOPHIL # BLD AUTO: 0.1 K/UL (ref 0–0.5)
EOSINOPHIL NFR BLD: 1.3 % (ref 0–8)
ERYTHROCYTE [DISTWIDTH] IN BLOOD BY AUTOMATED COUNT: 12.8 % (ref 11.5–14.5)
EST. GFR  (NO RACE VARIABLE): >60 ML/MIN/1.73 M^2
ESTIMATED AVG GLUCOSE: 120 MG/DL (ref 68–131)
GLUCOSE SERPL-MCNC: 111 MG/DL (ref 70–110)
HBA1C MFR BLD: 5.8 % (ref 4–5.6)
HCT VFR BLD AUTO: 39.7 % (ref 37–48.5)
HDLC SERPL-MCNC: 65 MG/DL (ref 40–75)
HDLC SERPL: 36.9 % (ref 20–50)
HGB BLD-MCNC: 13.2 G/DL (ref 12–16)
IMM GRANULOCYTES # BLD AUTO: 0.01 K/UL (ref 0–0.04)
IMM GRANULOCYTES NFR BLD AUTO: 0.2 % (ref 0–0.5)
LDLC SERPL CALC-MCNC: 101.2 MG/DL (ref 63–159)
LYMPHOCYTES # BLD AUTO: 2.8 K/UL (ref 1–4.8)
LYMPHOCYTES NFR BLD: 50.4 % (ref 18–48)
MCH RBC QN AUTO: 30.6 PG (ref 27–31)
MCHC RBC AUTO-ENTMCNC: 33.2 G/DL (ref 32–36)
MCV RBC AUTO: 92 FL (ref 82–98)
MONOCYTES # BLD AUTO: 0.5 K/UL (ref 0.3–1)
MONOCYTES NFR BLD: 8.8 % (ref 4–15)
NEUTROPHILS # BLD AUTO: 2.2 K/UL (ref 1.8–7.7)
NEUTROPHILS NFR BLD: 38.8 % (ref 38–73)
NONHDLC SERPL-MCNC: 111 MG/DL
NRBC BLD-RTO: 0 /100 WBC
PLATELET # BLD AUTO: 253 K/UL (ref 150–450)
PMV BLD AUTO: 10.5 FL (ref 9.2–12.9)
POTASSIUM SERPL-SCNC: 4.5 MMOL/L (ref 3.5–5.1)
PROT SERPL-MCNC: 7.6 G/DL (ref 6–8.4)
RBC # BLD AUTO: 4.31 M/UL (ref 4–5.4)
SODIUM SERPL-SCNC: 140 MMOL/L (ref 136–145)
TRIGL SERPL-MCNC: 49 MG/DL (ref 30–150)
TSH SERPL DL<=0.005 MIU/L-ACNC: 1.76 UIU/ML (ref 0.4–4)
WBC # BLD AUTO: 5.56 K/UL (ref 3.9–12.7)

## 2024-06-19 PROCEDURE — 83036 HEMOGLOBIN GLYCOSYLATED A1C: CPT | Performed by: FAMILY MEDICINE

## 2024-06-19 PROCEDURE — 80061 LIPID PANEL: CPT | Performed by: FAMILY MEDICINE

## 2024-06-19 PROCEDURE — 36415 COLL VENOUS BLD VENIPUNCTURE: CPT | Performed by: FAMILY MEDICINE

## 2024-06-19 PROCEDURE — 80053 COMPREHEN METABOLIC PANEL: CPT | Performed by: FAMILY MEDICINE

## 2024-06-19 PROCEDURE — 84443 ASSAY THYROID STIM HORMONE: CPT | Performed by: FAMILY MEDICINE

## 2024-06-19 PROCEDURE — 85025 COMPLETE CBC W/AUTO DIFF WBC: CPT | Performed by: FAMILY MEDICINE

## 2024-06-19 RX ORDER — ATORVASTATIN CALCIUM 80 MG/1
80 TABLET, FILM COATED ORAL DAILY
Qty: 90 TABLET | Refills: 3 | Status: SHIPPED | OUTPATIENT
Start: 2024-06-19 | End: 2025-06-19

## 2024-06-19 NOTE — ANESTHESIA PREPROCEDURE EVALUATION
06/19/2024  Rachael June is a 68 y.o., female.    Procedure: EGD (ESOPHAGOGASTRODUODENOSCOPY) (N/A)         Patient Active Problem List   Diagnosis    Hyperlipidemia    Genital herpes    Headache    Obesity (BMI 30.0-34.9)    Left shoulder pain    History of left shoulder replacement    GERD (gastroesophageal reflux disease)    Osteopenia    Vitamin D deficiency    Snoring    KEMAL (obstructive sleep apnea)    Insulinoma    Hemorrhoids, internal, with bleeding    Rectal bleeding    Screening for colon cancer    HTN (hypertension)    Chest pain    Atherosclerosis of aorta       Past Medical History:   Diagnosis Date    Abnormal Pap smear of cervix     Colon polyp     Condition not found     h/o abnl vaginal cuff biopsy-mild dysplasia    Dysplasia of vagina     vaginal cuff biopsy    Genital herpes, unspecified     GERD (gastroesophageal reflux disease)     HTN (hypertension)     HTN (hypertension) 02/26/2024    Hyperlipidemia     Insulinoma     s/p resection    Special screening for malignant neoplasms, colon 07/06/2015       ECHO: No results found for this or any previous visit.      There is no height or weight on file to calculate BMI.    Tobacco Use: Low Risk  (6/18/2024)    Patient History     Smoking Tobacco Use: Never     Smokeless Tobacco Use: Never     Passive Exposure: Not on file       Social History     Substance and Sexual Activity   Drug Use No        Alcohol Use: Not At Risk (3/25/2024)    AUDIT-C     Frequency of Alcohol Consumption: Never     Average Number of Drinks: Patient does not drink     Frequency of Binge Drinking: Never       Review of patient's allergies indicates:   Allergen Reactions    Adhesive tape-silicones     Crestor [rosuvastatin] Other (See Comments)     Myalgia in legs, has tolerated pravastatin    Pcn [penicillins]          Airway:  No value filed.    Pre-op  Assessment    I have reviewed the Patient Summary Reports.    I have reviewed the NPO Status.   I have reviewed the Medications.     Review of Systems  Anesthesia Hx:  No problems with previous Anesthesia             Denies Family Hx of Anesthesia complications.    Denies Personal Hx of Anesthesia complications.                    Social:  Non-Smoker, No Alcohol Use       Hematology/Oncology:  Hematology Normal   Oncology Normal                                   EENT/Dental:  EENT/Dental Normal           Cardiovascular:     Hypertension           hyperlipidemia                             Pulmonary:        Sleep Apnea                Renal/:  Renal/ Normal                 Hepatic/GI:     GERD             Musculoskeletal:  Musculoskeletal Normal                Neurological:      Headaches                                 Endocrine:  Endocrine Normal          Obesity / BMI > 30  Dermatological:  Skin Normal    Psych:  Psychiatric Normal                    Physical Exam  General: Well nourished, Cooperative, Alert and Oriented    Airway:  Mallampati: II   Mouth Opening: Normal  TM Distance: Normal  Tongue: Normal  Neck ROM: Normal ROM    Dental:  Intact    Chest/Lungs:  Clear to auscultation, Normal Respiratory Rate    Heart:  Rate: Normal  Rhythm: Regular Rhythm  Sounds: Normal    Abdomen:  Normal        Anesthesia Plan  Type of Anesthesia, risks & benefits discussed:    Anesthesia Type: Gen Natural Airway  Intra-op Monitoring Plan: Standard ASA Monitors  Post Op Pain Control Plan: multimodal analgesia  Induction:  IV  Informed Consent: Informed consent signed with the Patient and all parties understand the risks and agree with anesthesia plan.  All questions answered.   ASA Score: 2  Day of Surgery Review of History & Physical: H&P Update referred to the surgeon/provider.    Ready For Surgery From Anesthesia Perspective.     .

## 2024-06-26 ENCOUNTER — ANESTHESIA (OUTPATIENT)
Dept: ENDOSCOPY | Facility: HOSPITAL | Age: 69
End: 2024-06-26
Payer: MEDICARE

## 2024-06-26 ENCOUNTER — HOSPITAL ENCOUNTER (OUTPATIENT)
Facility: HOSPITAL | Age: 69
Discharge: HOME | End: 2024-06-26
Attending: INTERNAL MEDICINE | Admitting: INTERNAL MEDICINE
Payer: MEDICARE

## 2024-06-26 VITALS
WEIGHT: 170 LBS | DIASTOLIC BLOOD PRESSURE: 62 MMHG | BODY MASS INDEX: 32.1 KG/M2 | OXYGEN SATURATION: 97 % | TEMPERATURE: 98 F | RESPIRATION RATE: 16 BRPM | HEART RATE: 84 BPM | HEIGHT: 61 IN | SYSTOLIC BLOOD PRESSURE: 140 MMHG

## 2024-06-26 DIAGNOSIS — K21.9 GASTROESOPHAGEAL REFLUX DISEASE, UNSPECIFIED WHETHER ESOPHAGITIS PRESENT: Primary | ICD-10-CM

## 2024-06-26 DIAGNOSIS — K21.9 GERD (GASTROESOPHAGEAL REFLUX DISEASE): ICD-10-CM

## 2024-06-26 PROCEDURE — 27201012 HC FORCEPS, HOT/COLD, DISP: Performed by: INTERNAL MEDICINE

## 2024-06-26 PROCEDURE — 43239 EGD BIOPSY SINGLE/MULTIPLE: CPT | Mod: ,,, | Performed by: INTERNAL MEDICINE

## 2024-06-26 PROCEDURE — 99900035 HC TECH TIME PER 15 MIN (STAT)

## 2024-06-26 PROCEDURE — 37000008 HC ANESTHESIA 1ST 15 MINUTES: Performed by: INTERNAL MEDICINE

## 2024-06-26 PROCEDURE — 25000003 PHARM REV CODE 250: Performed by: NURSE ANESTHETIST, CERTIFIED REGISTERED

## 2024-06-26 PROCEDURE — 63600175 PHARM REV CODE 636 W HCPCS: Performed by: NURSE ANESTHETIST, CERTIFIED REGISTERED

## 2024-06-26 PROCEDURE — 43239 EGD BIOPSY SINGLE/MULTIPLE: CPT | Performed by: INTERNAL MEDICINE

## 2024-06-26 PROCEDURE — 37000009 HC ANESTHESIA EA ADD 15 MINS: Performed by: INTERNAL MEDICINE

## 2024-06-26 PROCEDURE — 88305 TISSUE EXAM BY PATHOLOGIST: CPT | Mod: 59 | Performed by: PATHOLOGY

## 2024-06-26 PROCEDURE — 94761 N-INVAS EAR/PLS OXIMETRY MLT: CPT

## 2024-06-26 RX ORDER — PROPOFOL 10 MG/ML
VIAL (ML) INTRAVENOUS
Status: DISCONTINUED | OUTPATIENT
Start: 2024-06-26 | End: 2024-06-26

## 2024-06-26 RX ORDER — PROPOFOL 10 MG/ML
VIAL (ML) INTRAVENOUS CONTINUOUS PRN
Status: DISCONTINUED | OUTPATIENT
Start: 2024-06-26 | End: 2024-06-26

## 2024-06-26 RX ORDER — LIDOCAINE HYDROCHLORIDE 20 MG/ML
INJECTION INTRAVENOUS
Status: DISCONTINUED | OUTPATIENT
Start: 2024-06-26 | End: 2024-06-26

## 2024-06-26 RX ORDER — SODIUM CHLORIDE 9 MG/ML
INJECTION, SOLUTION INTRAVENOUS CONTINUOUS
Status: DISCONTINUED | OUTPATIENT
Start: 2024-06-26 | End: 2024-06-26 | Stop reason: HOSPADM

## 2024-06-26 RX ADMIN — LIDOCAINE HYDROCHLORIDE 100 MG: 20 INJECTION INTRAVENOUS at 12:06

## 2024-06-26 RX ADMIN — PROPOFOL 90 MG: 10 INJECTION, EMULSION INTRAVENOUS at 12:06

## 2024-06-26 RX ADMIN — SODIUM CHLORIDE: 0.9 INJECTION, SOLUTION INTRAVENOUS at 12:06

## 2024-06-26 RX ADMIN — PROPOFOL 175 MCG/KG/MIN: 10 INJECTION, EMULSION INTRAVENOUS at 12:06

## 2024-06-26 RX ADMIN — GLYCOPYRROLATE 0.2 MG: 0.2 INJECTION, SOLUTION INTRAMUSCULAR; INTRAVENOUS at 12:06

## 2024-06-26 NOTE — TRANSFER OF CARE
"Anesthesia Transfer of Care Note    Patient: Rachael June    Procedure(s) Performed: Procedure(s) (LRB):  EGD (ESOPHAGOGASTRODUODENOSCOPY) (N/A)    Patient location: PACU    Anesthesia Type: general    Transport from OR: Transported from OR on room air with adequate spontaneous ventilation    Post pain: adequate analgesia    Post assessment: no apparent anesthetic complications and tolerated procedure well    Post vital signs: stable    Level of consciousness: responds to stimulation and sedated    Nausea/Vomiting: no nausea/vomiting    Complications: none    Transfer of care protocol was followed      Last vitals: Visit Vitals  BP (!) 149/70 (BP Location: Left arm, Patient Position: Lying)   Pulse 68   Temp 36.4 °C (97.5 °F) (Temporal)   Resp 16   Ht 5' 1" (1.549 m)   Wt 77.1 kg (170 lb)   SpO2 99%   Breastfeeding No   BMI 32.12 kg/m²     "

## 2024-06-26 NOTE — PROVATION PATIENT INSTRUCTIONS
Discharge Summary/Instructions after an Endoscopic Procedure  Patient Name: Rachael June  Patient MRN: [id number]  Patient YOB: 1955 Wednesday, June 26, 2024  Leonardo Maradiaga MD  Dear patient,  As a result of recent federal legislation (The Federal Cures Act), you may   receive lab or pathology results from your procedure in your [facility]   account before your physician is able to contact you. Your physician or   their representative will relay the results to you with their   recommendations at their soonest availability.  Thank you,  RESTRICTIONS:  During your procedure today, you received medications for sedation.  These   medications may affect your judgment, balance and coordination.  Therefore,   for 24 hours, you have the following restrictions:   - DO NOT drive a car, operate machinery, make legal/financial decisions,   sign important papers or drink alcohol.    ACTIVITY:  Today: no heavy lifting, straining or running due to procedural   sedation/anesthesia.  The following day: return to full activity including work.  DIET:  Eat and drink normally unless instructed otherwise.     TREATMENT FOR COMMON SIDE EFFECTS:  - Mild abdominal pain, nausea, belching, bloating or excessive gas:  rest,   eat lightly and use a heating pad.  - Sore Throat: treat with throat lozenges and/or gargle with warm salt   water.  - Because air was used during the procedure, expelling large amounts of air   from your rectum or belching is normal.  - If a bowel prep was taken, you may not have a bowel movement for 1-3 days.    This is normal.  SYMPTOMS TO WATCH FOR AND REPORT TO YOUR PHYSICIAN:  1. Abdominal pain or bloating, other than gas cramps.  2. Chest pain.  3. Back pain.  4. Signs of infection such as: chills or fever occurring within 24 hours   after the procedure.  5. Rectal bleeding, which would show as bright red, maroon, or black stools.   (A tablespoon of blood from the rectum is not serious, especially if    hemorrhoids are present.)  6. Vomiting.  7. Weakness or dizziness.  GO DIRECTLY TO THE NEAREST EMERGENCY ROOM IF YOU HAVE ANY OF THE FOLLOWING:      Difficulty breathing              Chills and/or fever over 101 F   Persistent vomiting and/or vomiting blood   Severe abdominal pain   Severe chest pain   Black, tarry stools   Bleeding- more than one tablespoon   Any other symptom or condition that you feel may need urgent attention  Your doctor recommends these additional instructions:  If any biopsies were taken, your doctors clinic will contact you in 1 to 2   weeks with any results.  - Patient has a contact number available for emergencies.  The signs and   symptoms of potential delayed complications were discussed with the   patient.  Return to normal activities tomorrow.  Written discharge   instructions were provided to the patient.   - Discharge patient to home.   - Resume previous diet.   - Continue present medications.   - Await pathology results.   For questions, problems or results please call your physician - Leonardo Maradiaga MD at Work:  [phone].  [facility], EMERGENCY ROOM PHONE NUMBER: [phone]  IF A COMPLICATION OR EMERGENCY SITUATION ARISES AND YOU ARE UNABLE TO REACH   YOUR PHYSICIAN - GO DIRECTLY TO THE EMERGENCY ROOM.  Leoanrdo Maradiaga MD  6/26/2024 1:04:37 PM  This report has been verified and signed electronically.  Dear patient,  As a result of recent federal legislation (The Federal Cures Act), you may   receive lab or pathology results from your procedure in your [facility]   account before your physician is able to contact you. Your physician or   their representative will relay the results to you with their   recommendations at their soonest availability.  Thank you,  PROVATION

## 2024-06-26 NOTE — ANESTHESIA POSTPROCEDURE EVALUATION
Anesthesia Post Evaluation    Patient: Rachael June    Procedure(s) Performed: Procedure(s) (LRB):  EGD (ESOPHAGOGASTRODUODENOSCOPY) (N/A)    Final Anesthesia Type: general      Patient location during evaluation: GI PACU  Patient participation: Yes- Able to Participate  Level of consciousness: awake and alert, oriented and awake  Post-procedure vital signs: reviewed and stable  Pain management: adequate  Airway patency: patent  KEMAL mitigation strategies: Multimodal analgesia  PONV status at discharge: No PONV  Anesthetic complications: no      Cardiovascular status: blood pressure returned to baseline and hemodynamically stable  Respiratory status: unassisted and spontaneous ventilation  Hydration status: euvolemic  Follow-up not needed.              Vitals Value Taken Time   /69 06/26/24 1321   Temp  06/26/24 1448   Pulse 76 06/26/24 1330   Resp 23 06/26/24 1329   SpO2 99 % 06/26/24 1330   Vitals shown include unfiled device data.      No case tracking events are documented in the log.      Pain/Meseret Score: Meseret Score: 10 (6/26/2024  1:13 PM)

## 2024-06-26 NOTE — H&P
Short Stay Endoscopy History and Physical    PCP - Froylan Eckert MD    Procedure - EGD  Sedation: GA  ASA - per anesthesia  Mallampati - per anesthesia  History of Anesthesia problems - no  Family history Anesthesia problems -  no     HPI:  This is a 68 y.o. female here for evaluation of : GERD    Reflux - yes  Dysphagia - no  Abdominal pain - no  Diarrhea - no    ROS:  Constitutional: No fevers, chills, No weight loss  ENT: No allergies  CV: No chest pain  Pulm: No cough, No shortness of breath  Ophtho: No vision changes  GI: see HPI  Medical History:  has a past medical history of Abnormal Pap smear of cervix, Colon polyp, Condition not found, Dysplasia of vagina, Genital herpes, unspecified, GERD (gastroesophageal reflux disease), HTN (hypertension), HTN (hypertension) (02/26/2024), Hyperlipidemia, Insulinoma, and Special screening for malignant neoplasms, colon (07/06/2015).    Surgical History:  has a past surgical history that includes insulinoma resection; Hysterectomy; Shoulder arthroscopy (Left, 9/23/2015); Colonoscopy w/ polypectomy; and Colonoscopy (N/A, 6/8/2017).    Family History: family history includes Breast cancer in her sister; Cancer in her maternal aunt and sister; Diabetes in her brother, father, mother, sister, and sister; Heart disease in her brother, father, and mother; Hyperlipidemia in her mother; Hypertension in her mother; Stroke in her father.. Otherwise no colon cancer, inflammatory bowel disease, or GI malignancies.    Social History:  reports that she has never smoked. She has never used smokeless tobacco. She reports that she does not drink alcohol and does not use drugs.    Review of patient's allergies indicates:   Allergen Reactions    Adhesive tape-silicones     Crestor [rosuvastatin] Other (See Comments)     Myalgia in legs, has tolerated pravastatin    Pcn [penicillins]        Medications:   Medications Prior to Admission   Medication Sig Dispense Refill Last Dose     aspirin (ECOTRIN) 81 MG EC tablet Take 81 mg by mouth once daily.       atorvastatin (LIPITOR) 80 MG tablet Take 1 tablet (80 mg total) by mouth once daily. 90 tablet 3     biotin 1 mg tablet Take 1,000 mcg by mouth once daily.       fish oil-omega-3 fatty acids 300-1,000 mg capsule Take 2 g by mouth once daily.       TI ROOT, BULK, MISC by Misc.(Non-Drug; Combo Route) route.       multivitamin capsule Take 1 capsule by mouth once daily.       soy isofla-blk cohosh-mag bark (ESTROVEN) 155 mg Cap Take by mouth.       valACYclovir (VALTREX) 1000 MG tablet Take 0.5 tablets (500 mg total) by mouth once daily. 1/2 tablet daily 45 tablet 11     valsartan (DIOVAN) 80 MG tablet Take 1 tablet (80 mg total) by mouth once daily. 30 tablet 11        Objective Findings:    Vital Signs: Per nursing notes.    Physical Exam:  General Appearance: Well appearing in no acute distress  Head:   Normocephalic, without obvious abnormality  Eyes:    No scleral icterus  Airway: Open  Neck: No restriction in mobility  Lungs: CTA bilaterally in anterior and posterior fields, no wheezes, no crackles.  Heart:  Regular rate and rhythm, S1, S2 normal, no murmurs heard  Abdomen: Soft, non tender, non distended      Labs:  Lab Results   Component Value Date    WBC 5.56 06/19/2024    HGB 13.2 06/19/2024    HCT 39.7 06/19/2024     06/19/2024    CHOL 176 06/19/2024    TRIG 49 06/19/2024    HDL 65 06/19/2024    ALT 24 06/19/2024    AST 20 06/19/2024     06/19/2024    K 4.5 06/19/2024     06/19/2024    CREATININE 0.8 06/19/2024    BUN 11 06/19/2024    CO2 27 06/19/2024    TSH 1.759 06/19/2024    INR 1.0 01/15/2015    HGBA1C 5.8 (H) 06/19/2024         I have explained the risks and benefits of endoscopy procedures to the patient including but not limited to bleeding, perforation, infection, and death.    Thank you so much for allowing me to participate in the care of Rachael Maradiaga MD

## 2024-07-01 LAB
FINAL PATHOLOGIC DIAGNOSIS: NORMAL
GROSS: NORMAL
Lab: NORMAL

## 2024-07-05 ENCOUNTER — HOSPITAL ENCOUNTER (OUTPATIENT)
Dept: RADIOLOGY | Facility: HOSPITAL | Age: 69
Discharge: HOME | End: 2024-07-05
Attending: FAMILY MEDICINE
Payer: MEDICARE

## 2024-07-05 DIAGNOSIS — R91.8 OTHER NONSPECIFIC ABNORMAL FINDING OF LUNG FIELD: ICD-10-CM

## 2024-07-05 PROCEDURE — 71250 CT THORAX DX C-: CPT | Mod: TC

## 2024-07-05 PROCEDURE — 71250 CT THORAX DX C-: CPT | Mod: 26,,, | Performed by: RADIOLOGY

## 2024-07-12 ENCOUNTER — PATIENT MESSAGE (OUTPATIENT)
Dept: GASTROENTEROLOGY | Facility: CLINIC | Age: 69
End: 2024-07-12
Payer: MEDICARE

## 2024-08-26 ENCOUNTER — HOSPITAL ENCOUNTER (OUTPATIENT)
Dept: RADIOLOGY | Facility: HOSPITAL | Age: 69
Discharge: HOME | End: 2024-08-26
Attending: FAMILY MEDICINE
Payer: MEDICARE

## 2024-08-26 DIAGNOSIS — Z12.31 BREAST CANCER SCREENING BY MAMMOGRAM: ICD-10-CM

## 2024-08-26 PROCEDURE — 77067 SCR MAMMO BI INCL CAD: CPT | Mod: TC

## 2024-08-26 PROCEDURE — 77063 BREAST TOMOSYNTHESIS BI: CPT | Mod: TC

## 2024-09-20 ENCOUNTER — TELEPHONE (OUTPATIENT)
Dept: DERMATOLOGY | Facility: CLINIC | Age: 69
End: 2024-09-20
Payer: MEDICARE

## 2024-09-20 NOTE — TELEPHONE ENCOUNTER
----- Message from Ceci Orlando MA sent at 9/19/2024  4:44 PM CDT -----  Regarding: FW: Appt  Contact: Pt  [phone]  Wants appointment with Greg for skin tag . Also wants to be seen by dermatology or at least scheduled with one sooner than December  ----- Message -----  From: Lauren Grace  Sent: 9/19/2024  10:27 AM CDT  To: [facility] Derm Clinical Support Triage  Subject: Appt                                             Pt is calling to schedule a appt for a skin tag under eye, pt refused Dec date please call

## 2024-09-27 ENCOUNTER — OFFICE VISIT (OUTPATIENT)
Dept: DERMATOLOGY | Facility: CLINIC | Age: 69
End: 2024-09-27
Payer: MEDICARE

## 2024-09-27 DIAGNOSIS — L91.8 SKIN TAG: ICD-10-CM

## 2024-09-27 DIAGNOSIS — L82.1 DERMATOSIS PAPULOSA NIGRA: Primary | ICD-10-CM

## 2024-09-27 PROCEDURE — 99999 PR PBB SHADOW E&M-EST. PATIENT-LVL III: CPT | Mod: PBBFAC,,, | Performed by: STUDENT IN AN ORGANIZED HEALTH CARE EDUCATION/TRAINING PROGRAM

## 2024-09-27 NOTE — PROGRESS NOTES
Subjective:      Patient ID:  Rachael June is a 69 y.o. female who presents for   Chief Complaint   Patient presents with    Lesion     Face, Neck     Rachael June is a 69 y.o. female who presents for: evaluation of skin lesions.    New patient    The patient has the following lesions of concern:  Location: spots by eyes and neck  Duration: a while  Symptoms: lesion by R eye bothering pt the most  Relieving factors/Previous treatments: none    Pertinent history:  History of blistering sunburns: No  History of tanning bed use: No  Family history of melanoma: No  Personal history of mole removal: No  Personal history of skin cancer: No         Review of Systems   Skin:  Positive for activity-related sunscreen use. Negative for daily sunscreen use and recent sunburn.   Hematologic/Lymphatic: Bruises/bleeds easily (Baby aspirin daily).       Objective:   Physical Exam   Skin:   Areas Examined (abnormalities noted in diagram):   Head / Face Inspection Performed            Diagram Legend     Erythematous scaling macule/papule c/w actinic keratosis       Vascular papule c/w angioma      Pigmented verrucoid papule/plaque c/w seborrheic keratosis      Yellow umbilicated papule c/w sebaceous hyperplasia      Irregularly shaped tan macule c/w lentigo     1-2 mm smooth white papules consistent with Milia      Movable subcutaneous cyst with punctum c/w epidermal inclusion cyst      Subcutaneous movable cyst c/w pilar cyst      Firm pink to brown papule c/w dermatofibroma      Pedunculated fleshy papule(s) c/w skin tag(s)      Evenly pigmented macule c/w junctional nevus     Mildly variegated pigmented, slightly irregular-bordered macule c/w mildly atypical nevus      Flesh colored to evenly pigmented papule c/w intradermal nevus       Pink pearly papule/plaque c/w basal cell carcinoma      Erythematous hyperkeratotic cursted plaque c/w SCC      Surgical scar with no sign of skin cancer recurrence      Open  and closed comedones      Inflammatory papules and pustules      Verrucoid papule consistent consistent with wart     Erythematous eczematous patches and plaques     Dystrophic onycholytic nail with subungual debris c/w onychomycosis     Umbilicated papule    Erythematous-base heme-crusted tan verrucoid plaque consistent with inflamed seborrheic keratosis     Erythematous Silvery Scaling Plaque c/w Psoriasis     See annotation      Assessment / Plan:        Dermatosis papulosa nigra  Skin tag  Around eyes and neck  Reassurance given to patient. No treatment is necessary.   Treatment of benign, asymptomatic lesions may be considered cosmetic.  Cosmetic removal is $300 up to 15. Reviewed removal process    RTC prn

## 2024-12-19 ENCOUNTER — PATIENT MESSAGE (OUTPATIENT)
Dept: FAMILY MEDICINE | Facility: CLINIC | Age: 69
End: 2024-12-19
Payer: MEDICARE

## 2024-12-19 ENCOUNTER — PATIENT MESSAGE (OUTPATIENT)
Dept: OBSTETRICS AND GYNECOLOGY | Facility: CLINIC | Age: 69
End: 2024-12-19
Payer: MEDICARE

## 2024-12-20 RX ORDER — ACYCLOVIR 400 MG/1
400 TABLET ORAL 2 TIMES DAILY
Qty: 60 TABLET | Refills: 11 | Status: SHIPPED | OUTPATIENT
Start: 2024-12-20

## 2025-01-24 ENCOUNTER — LAB VISIT (OUTPATIENT)
Dept: LAB | Facility: HOSPITAL | Age: 70
End: 2025-01-24
Attending: FAMILY MEDICINE
Payer: MEDICARE

## 2025-01-24 ENCOUNTER — OFFICE VISIT (OUTPATIENT)
Dept: FAMILY MEDICINE | Facility: CLINIC | Age: 70
End: 2025-01-24
Payer: MEDICARE

## 2025-01-24 VITALS
TEMPERATURE: 98 F | HEIGHT: 62 IN | DIASTOLIC BLOOD PRESSURE: 70 MMHG | BODY MASS INDEX: 32.14 KG/M2 | WEIGHT: 174.63 LBS | SYSTOLIC BLOOD PRESSURE: 140 MMHG

## 2025-01-24 DIAGNOSIS — E78.5 HYPERLIPIDEMIA, UNSPECIFIED HYPERLIPIDEMIA TYPE: ICD-10-CM

## 2025-01-24 DIAGNOSIS — M70.61 GREATER TROCHANTERIC BURSITIS OF RIGHT HIP: ICD-10-CM

## 2025-01-24 DIAGNOSIS — R73.01 IFG (IMPAIRED FASTING GLUCOSE): ICD-10-CM

## 2025-01-24 DIAGNOSIS — I10 HYPERTENSION, UNSPECIFIED TYPE: ICD-10-CM

## 2025-01-24 DIAGNOSIS — M70.61 GREATER TROCHANTERIC BURSITIS OF RIGHT HIP: Primary | ICD-10-CM

## 2025-01-24 LAB
ALBUMIN SERPL BCP-MCNC: 4.1 G/DL (ref 3.5–5.2)
ALP SERPL-CCNC: 129 U/L (ref 40–150)
ALT SERPL W/O P-5'-P-CCNC: 38 U/L (ref 10–44)
ANION GAP SERPL CALC-SCNC: 9 MMOL/L (ref 8–16)
AST SERPL-CCNC: 27 U/L (ref 10–40)
BASOPHILS # BLD AUTO: 0.02 K/UL (ref 0–0.2)
BASOPHILS NFR BLD: 0.3 % (ref 0–1.9)
BILIRUB SERPL-MCNC: 0.9 MG/DL (ref 0.1–1)
BUN SERPL-MCNC: 13 MG/DL (ref 8–23)
CALCIUM SERPL-MCNC: 9.9 MG/DL (ref 8.7–10.5)
CHLORIDE SERPL-SCNC: 105 MMOL/L (ref 95–110)
CHOLEST SERPL-MCNC: 162 MG/DL (ref 120–199)
CHOLEST/HDLC SERPL: 2.6 {RATIO} (ref 2–5)
CO2 SERPL-SCNC: 28 MMOL/L (ref 23–29)
CREAT SERPL-MCNC: 0.8 MG/DL (ref 0.5–1.4)
DIFFERENTIAL METHOD BLD: NORMAL
EOSINOPHIL # BLD AUTO: 0 K/UL (ref 0–0.5)
EOSINOPHIL NFR BLD: 0.7 % (ref 0–8)
ERYTHROCYTE [DISTWIDTH] IN BLOOD BY AUTOMATED COUNT: 12.5 % (ref 11.5–14.5)
EST. GFR  (NO RACE VARIABLE): >60 ML/MIN/1.73 M^2
ESTIMATED AVG GLUCOSE: 128 MG/DL (ref 68–131)
GLUCOSE SERPL-MCNC: 91 MG/DL (ref 70–110)
HBA1C MFR BLD: 6.1 % (ref 4–5.6)
HCT VFR BLD AUTO: 42.3 % (ref 37–48.5)
HDLC SERPL-MCNC: 63 MG/DL (ref 40–75)
HDLC SERPL: 38.9 % (ref 20–50)
HGB BLD-MCNC: 13.8 G/DL (ref 12–16)
IMM GRANULOCYTES # BLD AUTO: 0.01 K/UL (ref 0–0.04)
IMM GRANULOCYTES NFR BLD AUTO: 0.2 % (ref 0–0.5)
LDLC SERPL CALC-MCNC: 85.6 MG/DL (ref 63–159)
LYMPHOCYTES # BLD AUTO: 2.7 K/UL (ref 1–4.8)
LYMPHOCYTES NFR BLD: 44.6 % (ref 18–48)
MCH RBC QN AUTO: 30.3 PG (ref 27–31)
MCHC RBC AUTO-ENTMCNC: 32.6 G/DL (ref 32–36)
MCV RBC AUTO: 93 FL (ref 82–98)
MONOCYTES # BLD AUTO: 0.4 K/UL (ref 0.3–1)
MONOCYTES NFR BLD: 7.4 % (ref 4–15)
NEUTROPHILS # BLD AUTO: 2.8 K/UL (ref 1.8–7.7)
NEUTROPHILS NFR BLD: 46.8 % (ref 38–73)
NONHDLC SERPL-MCNC: 99 MG/DL
NRBC BLD-RTO: 0 /100 WBC
PLATELET # BLD AUTO: 266 K/UL (ref 150–450)
PMV BLD AUTO: 10.4 FL (ref 9.2–12.9)
POTASSIUM SERPL-SCNC: 4.1 MMOL/L (ref 3.5–5.1)
PROT SERPL-MCNC: 7.7 G/DL (ref 6–8.4)
RBC # BLD AUTO: 4.56 M/UL (ref 4–5.4)
SODIUM SERPL-SCNC: 142 MMOL/L (ref 136–145)
TRIGL SERPL-MCNC: 67 MG/DL (ref 30–150)
TSH SERPL DL<=0.005 MIU/L-ACNC: 1.36 UIU/ML (ref 0.4–4)
WBC # BLD AUTO: 5.98 K/UL (ref 3.9–12.7)

## 2025-01-24 PROCEDURE — 83036 HEMOGLOBIN GLYCOSYLATED A1C: CPT | Performed by: FAMILY MEDICINE

## 2025-01-24 PROCEDURE — 84443 ASSAY THYROID STIM HORMONE: CPT | Performed by: FAMILY MEDICINE

## 2025-01-24 PROCEDURE — 85025 COMPLETE CBC W/AUTO DIFF WBC: CPT | Performed by: FAMILY MEDICINE

## 2025-01-24 PROCEDURE — 3008F BODY MASS INDEX DOCD: CPT | Mod: CPTII,S$GLB,, | Performed by: FAMILY MEDICINE

## 2025-01-24 PROCEDURE — 99214 OFFICE O/P EST MOD 30 MIN: CPT | Mod: S$GLB,,, | Performed by: FAMILY MEDICINE

## 2025-01-24 PROCEDURE — 1125F AMNT PAIN NOTED PAIN PRSNT: CPT | Mod: CPTII,S$GLB,, | Performed by: FAMILY MEDICINE

## 2025-01-24 PROCEDURE — 3077F SYST BP >= 140 MM HG: CPT | Mod: CPTII,S$GLB,, | Performed by: FAMILY MEDICINE

## 2025-01-24 PROCEDURE — 1101F PT FALLS ASSESS-DOCD LE1/YR: CPT | Mod: CPTII,S$GLB,, | Performed by: FAMILY MEDICINE

## 2025-01-24 PROCEDURE — 3288F FALL RISK ASSESSMENT DOCD: CPT | Mod: CPTII,S$GLB,, | Performed by: FAMILY MEDICINE

## 2025-01-24 PROCEDURE — 3078F DIAST BP <80 MM HG: CPT | Mod: CPTII,S$GLB,, | Performed by: FAMILY MEDICINE

## 2025-01-24 PROCEDURE — 1160F RVW MEDS BY RX/DR IN RCRD: CPT | Mod: CPTII,S$GLB,, | Performed by: FAMILY MEDICINE

## 2025-01-24 PROCEDURE — G2211 COMPLEX E/M VISIT ADD ON: HCPCS | Mod: S$GLB,,, | Performed by: FAMILY MEDICINE

## 2025-01-24 PROCEDURE — 80053 COMPREHEN METABOLIC PANEL: CPT | Performed by: FAMILY MEDICINE

## 2025-01-24 PROCEDURE — 99999 PR PBB SHADOW E&M-EST. PATIENT-LVL IV: CPT | Mod: PBBFAC,,, | Performed by: FAMILY MEDICINE

## 2025-01-24 PROCEDURE — 1159F MED LIST DOCD IN RCRD: CPT | Mod: CPTII,S$GLB,, | Performed by: FAMILY MEDICINE

## 2025-01-24 PROCEDURE — 36415 COLL VENOUS BLD VENIPUNCTURE: CPT | Performed by: FAMILY MEDICINE

## 2025-01-24 PROCEDURE — 80061 LIPID PANEL: CPT | Performed by: FAMILY MEDICINE

## 2025-01-24 NOTE — PATIENT INSTRUCTIONS
Trochanteric Bursitis Rehabilitation Exercises  You can begin stretching the muscles that run along the outside of your hip using the first 3 exercises. You can do the strengthening exercises when the sharp pain lessens.   Stretching exercises   Gluteal stretch: Lying on your back with both knees bent, rest the ankle of one leg over the knee of your other leg. Grasp the thigh of the bottom leg and pull that knee toward your chest. You will feel a stretch along the buttocks and possibly along the outside of your hip on the top leg. Hold this for 15 to 30 seconds. Repeat 3 times.   Iliotibial band stretch: Standing: Cross one leg in front of the other leg and bend down and touch your toes. You can move your hands across the floor toward the front leg and you will feel more stretch on the outside of your thigh on the other side. Hold this position for 15 to 30 seconds. Return to the starting position. Repeat 3 times. Reverse the positions of your legs and repeat.   Iliotibial band stretch: Side-leaning: Stand sideways near a wall. Place one hand on the wall for support. Cross the leg farthest from the wall over the other leg, keeping the foot closest to the wall flat on the floor. Lean your hips into the wall. Hold the stretch for 15 seconds, repeat 3 times, and then switch legs and repeat the exercise another 3 times.   Strengthening exercises   Straight leg raise: Lie on your back with your legs straight out in front of you. Bend the knee on your uninjured side and place the foot flat on the floor. Tighten the thigh muscle of the other leg and lift it about 8 inches off the floor, keeping the thigh muscle tight throughout. Slowly lower your leg back down to the floor. Do 3 sets of 10.   Prone hip extension: Lie on your stomach with your legs straight out behind you. Tighten the buttocks and thigh muscles of your injured leg and lift it off the floor about 8 inches. Keep your knee straight. Hold for 5 seconds. Then  lower your leg and relax. Do 3 sets of 10.   Side-lying leg lift: Lying on your uninjured side, tighten the front thigh muscles on your top leg and lift that leg 8 to 10 inches away from the other leg. Keep the leg straight and lower slowly. Do 3 sets of 10.   Wall squat with a ball: Stand with your back, shoulders, and head against a wall and look straight ahead. Keep your shoulders relaxed and your feet 2 feet away from the wall and a shoulder's width apart. Place a soccer or basketball-sized ball behind your back. Keeping your back upright, slowly squat down to a 45-degree angle. Your thighs will not yet be parallel to the floor. Hold this position for 10 seconds and then slowly slide back up the wall. Repeat 10 times. Build up to 3 sets of 10.   Written by Jalyn Francsico MS, PT, and Mckenzie Aguero PT, [facility], [facility], for [employer].   Published by [facility].  © 2009 [facility] and/or its affiliates. All Rights Reserved.

## 2025-01-24 NOTE — PROGRESS NOTES
(Portions of this note were dictated using voice recognition software and may contain dictation related errors in spelling/grammar/syntax not found on text review)    CC:   Chief Complaint   Patient presents with    Follow-up    Pain     Hip pain, swollen legs (All the way up to foot at night)         HPI: 69 y.o. female      Annual exam 06/18/2024    Urgent care concern for leg pain started 5 days ago. Right leg lat hip, thigh, lower leg. Improved since then but when sits down will be stiff again. Would notice some tingling in right foot. No left sided symptoms. No back pain. No fall/injury.  Used icy/hot and aspercreme helped a little  No ice or heat    Blood pressure slightly elevated today 140/70 on intake, rechecked at 1 40/82.  Blood pressures at home when she checks her usually much better controlled like 130/60 or so.  Taking valsartan daily for blood pressure, 80 mg daily      Answers submitted by the patient for this visit:  Back Pain Questionnaire (Submitted on 1/22/2025)  Chief Complaint: Back pain  Chronicity: new  Onset: in the past 7 days  Frequency: daily  Progression since onset: waxing and waning  Pain location: sacro-iliac  Pain quality: aching  Radiates to: right thigh  Pain is: worse during the night  Aggravated by: sitting  Stiffness is present: in the morning  abdominal pain: No  bladder incontinence: No  chest pain: No  dysuria: No  headaches: No  leg pain: No  paresis: No  paresthesias: No  perianal numbness: No  tingling: No  weight loss: No  genital pain: No  Risk factors: lack of exercise, obesity, poor posture  Pain severity: moderate  Improvement on treatment: mild    Chronic medical history  GERD:  .  Saw gastro on 05/16/2024.  EGD ordered for 06/26/2024.  Was on pantoprazole but felt like she did not really need it.  Currently denies any symptoms    CAD noted on PET stress  05/28/2024There was no sign of perfusion abnormality.  Has moderate diffuse coronary calcifications LAD mild  calcifications RCA and moderate diffuse aortic calcifications in descending aorta.  EF was 70% at rest and 76% at stress.    Has had elevated calcium score on labs.  Also during that calcium score CT, there was incidentally noted some tiny micro nodules less than 4 mm.  There was some consideration for inflammatory/infectious etiology.  Had questions or at the time of that study and she stated that she was having some cough and back pain at that time.  Empirically treated for CPAP with doxycycline.  Currently denies any respiratory symptoms.  Has updated chest CT coming up to follow  Currently on atorvastatin 80 mg daily.  Initially on pravastatin prior for hyperlipidemia, was upgraded to rosuvastatin 40 mg but could not tolerate secondary to leg pains.    PreDM:  last a1c 5.8    Genital HSV: daily suppressive acyclovir (was on Valtrex but insurance stopped covering)    AR: on flonase    Intermittent constipation takes miralax    Peripheral edema right leg/foot past few years, have had several investigations over past no prominent findings. Was tried on diuretics but not really helpful. Better in am and more prominent through day. No pain. Finds that she is sensitive to sodium and fluid retention.     KEMAL mild: PSG 2018, doesn't use CPAP     Hx of insulinoma 2008, removed and no specific issues since.     Hypertension, on valsartan 80 mg daily .  Tried amlodipine but cause significant leg stiffness.             No tob or ETOH  Works for [employer] as   Takes care of granddtr    Diet: +indiscretions, +sweets, fast food 1-2 x monthly  Exercise: joined gym , exercises 3-4 times weekly    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      Past Medical History:   Diagnosis Date    Abnormal Pap smear of cervix     Colon polyp     Condition not found     h/o abnl vaginal cuff biopsy-mild dysplasia    Dysplasia of vagina     vaginal cuff biopsy    Genital herpes, unspecified     GERD (gastroesophageal reflux disease)     HTN (hypertension)      HTN (hypertension) 02/26/2024    Hyperlipidemia     Insulinoma     s/p resection    Special screening for malignant neoplasms, colon 07/06/2015       Past Surgical History:   Procedure Laterality Date    COLONOSCOPY N/A 6/8/2017    Procedure: COLONOSCOPY;  Surgeon: Chadd Davis MD;  Location: [address]);  Service: Endoscopy;  Laterality: N/A;    COLONOSCOPY W/ POLYPECTOMY      ESOPHAGOGASTRODUODENOSCOPY N/A 6/26/2024    Procedure: EGD (ESOPHAGOGASTRODUODENOSCOPY);  Surgeon: Leonardo Maradiaga MD;  Location: [facility] ENDOSCOPY;  Service: Endoscopy;  Laterality: N/A;  urgent egd / dr maradiaga pt seen in clinic on 5/16/prep instructions sent to pt via portal   pt request procedure be scheduled after her PET scan  6/18/24- pc complete. DBM    HYSTERECTOMY      fibroids    insulinoma resection      SHOULDER ARTHROSCOPY Left 9/23/2015    Dr Rm        Family History   Problem Relation Name Age of Onset    Heart disease Mother      Hypertension Mother      Diabetes Mother      Hyperlipidemia Mother      Stroke Father age 80     Diabetes Father age 80     Heart disease Father age 80     Cancer Sister          breast cancer    Diabetes Sister      Breast cancer Sister      Cancer Maternal Aunt          ovarian cancer    Diabetes Brother      Diabetes Sister      Heart disease Brother         Social History     Tobacco Use    Smoking status: Never    Smokeless tobacco: Never   Substance Use Topics    Alcohol use: No    Drug use: No       Lab Results   Component Value Date    WBC 5.56 06/19/2024    HGB 13.2 06/19/2024    HCT 39.7 06/19/2024    MCV 92 06/19/2024     06/19/2024    CHOL 176 06/19/2024    TRIG 49 06/19/2024    HDL 65 06/19/2024    ALT 24 06/19/2024    AST 20 06/19/2024    BILITOT 0.4 06/19/2024    ALKPHOS 104 06/19/2024     06/19/2024    K 4.5 06/19/2024     06/19/2024    CREATININE 0.8 06/19/2024    ESTGFRAFRICA >60.0 07/15/2022    EGFRNONAA >60.0 07/15/2022    CALCIUM 10.1  "06/19/2024    ALBUMIN 3.9 06/19/2024    BUN 11 06/19/2024    CO2 27 06/19/2024    TSH 1.759 06/19/2024    INR 1.0 01/15/2015    HGBA1C 5.8 (H) 06/19/2024    MICALBCREAT 10.0 10/29/2008    LDLCALC 101.2 06/19/2024     (H) 06/19/2024    [id number] 35 06/06/2017       Lab Results   Component Value Date    HGBA1C 5.8 (H) 06/19/2024    HGBA1C 5.7 (H) 07/14/2023    HGBA1C 5.9 (H) 07/15/2022    HGBA1C 5.9 06/06/2017    HGBA1C 5.7 12/02/2016             Vital signs reviewed  Vitals:    01/24/25 1018   BP: (!) 140/70   BP Location: Right arm   Patient Position: Sitting   Temp: 98.2 °F (36.8 °C)   TempSrc: Oral   Weight: 79.2 kg (174 lb 9.7 oz)   Height: 5' 2" (1.575 m)             Wt Readings from Last 4 Encounters:   01/24/25 79.2 kg (174 lb 9.7 oz)   06/26/24 77.1 kg (170 lb)   06/18/24 78 kg (171 lb 15.3 oz)   05/28/24 77.1 kg (170 lb)       Vital signs reviewed  PE:   APPEARANCE: Well nourished, well developed, in no acute distress.    HEAD: Normocephalic, atraumatic.  EYES:  Conjunctivae noninjected.  MSK:  Mild right sacroiliac tenderness to palpation.  No midline lumbar tenderness and no left-sided sacroiliac tenderness.  No paraspinous tenderness.  Negative straight leg raise bilaterally.  2+ patellar and ankle reflexes bilaterally.  Motor:  4/5 hip flexors bilaterally, 5/5 quads bilaterally, 5/5 hamstrings bilaterally, 5/5 foot dorsiflexion and plantar flexion bilaterally.  Hip:  Pain at bilateral greater trochanters, right more pronounced than left.  Pain along right IT band.  Normal internal and external rotation of both hips although she gets some slight trochanteric region pain with internal rotation of the hip bilaterally right-greater-than-left.      IMPRESSION  1. Greater trochanteric bursitis of right hip    2. Hypertension, unspecified type    3. IFG (impaired fasting glucose)    4. Hyperlipidemia, unspecified hyperlipidemia type                  PLAN   Discussed likely trochanteric bursitis of " right hip, with pain radiating or long IT band.  No other neuropathic signs on exam.  Would advise continue topical NSAIDs, can try oral ibuprofen for the next several days up to the next week if needed.  Advise on IT band exercises which has been printed.    Blood pressure:  Typically Stable on Diovan 80 mg daily.  BP slightly high today but usually better at home.  Advise continued monitoring at home and send readings over the next week         Dyslipidemia with coronary artery and aortic  calcifications noted on recent PET-CT.  Currently Atorvastatin 80 mg daily, needs lab including update lipids.  Had advise she restart low-dose aspirin 81 mg a as long as tolerated as she was on it at 1 point but had gotten off of it.             SCREENINGS      Immunizations:   covid booster up-to-date  tdap 2016  PCV20 7/2022  Zoster up-to-date  Flu up-to-date    Age/demographic appropriate health maintenance:  MMG  08/26/2024  DEXA 2022 normal.    colonoscopy 2017, internal hemorrhoids otherwise normal. Rpt April 2022 (2 polyps removed.--[facility])

## 2025-01-28 ENCOUNTER — PATIENT OUTREACH (OUTPATIENT)
Dept: ADMINISTRATIVE | Facility: HOSPITAL | Age: 70
End: 2025-01-28
Payer: MEDICARE

## 2025-02-14 ENCOUNTER — OFFICE VISIT (OUTPATIENT)
Dept: FAMILY MEDICINE | Facility: CLINIC | Age: 70
End: 2025-02-14
Payer: MEDICARE

## 2025-02-14 ENCOUNTER — TELEPHONE (OUTPATIENT)
Dept: FAMILY MEDICINE | Facility: CLINIC | Age: 70
End: 2025-02-14

## 2025-02-14 VITALS
TEMPERATURE: 98 F | WEIGHT: 175.94 LBS | HEART RATE: 78 BPM | HEIGHT: 62 IN | DIASTOLIC BLOOD PRESSURE: 76 MMHG | OXYGEN SATURATION: 98 % | SYSTOLIC BLOOD PRESSURE: 138 MMHG | BODY MASS INDEX: 32.37 KG/M2

## 2025-02-14 DIAGNOSIS — R09.81 NASAL CONGESTION: ICD-10-CM

## 2025-02-14 DIAGNOSIS — R05.9 COUGH, UNSPECIFIED TYPE: Primary | ICD-10-CM

## 2025-02-14 DIAGNOSIS — H66.003 ACUTE SUPPURATIVE OTITIS MEDIA OF BOTH EARS WITHOUT SPONTANEOUS RUPTURE OF TYMPANIC MEMBRANES, RECURRENCE NOT SPECIFIED: ICD-10-CM

## 2025-02-14 LAB
CTP QC/QA: YES
CTP QC/QA: YES
POC MOLECULAR INFLUENZA A AGN: NEGATIVE
POC MOLECULAR INFLUENZA B AGN: NEGATIVE
SARS-COV-2 RDRP RESP QL NAA+PROBE: NEGATIVE

## 2025-02-14 PROCEDURE — 99999 PR PBB SHADOW E&M-EST. PATIENT-LVL IV: CPT | Mod: PBBFAC,,,

## 2025-02-14 RX ORDER — PROMETHAZINE HYDROCHLORIDE AND DEXTROMETHORPHAN HYDROBROMIDE 6.25; 15 MG/5ML; MG/5ML
5 SYRUP ORAL 2 TIMES DAILY PRN
Qty: 180 ML | Refills: 1 | Status: SHIPPED | OUTPATIENT
Start: 2025-02-14

## 2025-02-14 RX ORDER — FLUTICASONE PROPIONATE 50 MCG
1 SPRAY, SUSPENSION (ML) NASAL DAILY
Qty: 15.8 ML | Refills: 0 | Status: SHIPPED | OUTPATIENT
Start: 2025-02-14

## 2025-02-14 RX ORDER — DOXYCYCLINE 100 MG/1
100 CAPSULE ORAL EVERY 12 HOURS
Qty: 10 CAPSULE | Refills: 0 | Status: SHIPPED | OUTPATIENT
Start: 2025-02-14 | End: 2025-02-19

## 2025-02-14 NOTE — PROGRESS NOTES
Subjective     Patient ID: Rachael June is a 69 y.o. female.    Chief Complaint: URI (Cough, sneezing, sore throat, headache)      Patient is a 69 year old black female with HTN, HLD, GERD that presents to clinic today as a new patient to me, established with Dr. Eckert, for c/o sore throat, cough, congestion that started last Thursday. Denies any fever, CP,SOB, abdominal pain, n/v/d. Endorses headaches, cough, ear pain, sore throat. Has been taking Mucinex at home with minimal relief.     Pmhx and allergies reviewed.    Cough  This is a new problem. The current episode started in the past 7 days. The problem has been rapidly worsening. The problem occurs hourly. The cough is Productive of sputum. Associated symptoms include ear congestion, ear pain, headaches, nasal congestion, postnasal drip, rhinorrhea and a sore throat. Pertinent negatives include no chest pain, chills, fever, heartburn, hemoptysis, myalgias, rash or shortness of breath. The symptoms are aggravated by nothing and lying down. Nothing and lying down aggravates the symptoms. She has tried OTC cough suppressant and rest for the symptoms. The treatment provided mild relief. There is no history of asthma, bronchiectasis, bronchitis, COPD, emphysema, environmental allergies or pneumonia.     Review of Systems   Constitutional:  Negative for chills and fever.   HENT:  Positive for ear pain, postnasal drip, rhinorrhea and sore throat.    Eyes:  Negative for visual disturbance.   Respiratory:  Positive for cough. Negative for hemoptysis and shortness of breath.    Cardiovascular:  Negative for chest pain and palpitations.   Gastrointestinal:  Negative for abdominal pain, heartburn and nausea.   Genitourinary:  Negative for difficulty urinating and dysuria.   Musculoskeletal:  Negative for back pain, myalgias and neck pain.   Integumentary:  Negative for rash.   Allergic/Immunologic: Negative for environmental allergies.   Neurological:  Positive  for headaches. Negative for dizziness.   Psychiatric/Behavioral:  Negative for confusion and hallucinations.      Past Medical History:   Diagnosis Date    Abnormal Pap smear of cervix     Colon polyp     Condition not found     h/o abnl vaginal cuff biopsy-mild dysplasia    Dysplasia of vagina     vaginal cuff biopsy    Genital herpes, unspecified     GERD (gastroesophageal reflux disease)     HTN (hypertension)     HTN (hypertension) 02/26/2024    Hyperlipidemia     Insulinoma     s/p resection    Special screening for malignant neoplasms, colon 07/06/2015       Past Surgical History:   Procedure Laterality Date    COLONOSCOPY N/A 6/8/2017    Procedure: COLONOSCOPY;  Surgeon: Chadd Davis MD;  Location: [facility] ENDO [address]);  Service: Endoscopy;  Laterality: N/A;    COLONOSCOPY W/ POLYPECTOMY      ESOPHAGOGASTRODUODENOSCOPY N/A 6/26/2024    Procedure: EGD (ESOPHAGOGASTRODUODENOSCOPY);  Surgeon: Leonardo Maradiaga MD;  Location: [facility] ENDOSCOPY;  Service: Endoscopy;  Laterality: N/A;  urgent egd / dr maradiaga pt seen in clinic on 5/16/prep instructions sent to pt via portal   pt request procedure be scheduled after her PET scan  6/18/24- pc complete. DBM    HYSTERECTOMY      fibroids    insulinoma resection      SHOULDER ARTHROSCOPY Left 9/23/2015    Dr Rm        Family History   Problem Relation Name Age of Onset    Heart disease Mother      Hypertension Mother      Diabetes Mother      Hyperlipidemia Mother      Stroke Father age 80     Diabetes Father age 80     Heart disease Father age 80     Cancer Sister          breast cancer    Diabetes Sister      Breast cancer Sister      Cancer Maternal Aunt          ovarian cancer    Diabetes Brother      Diabetes Sister      Heart disease Brother         Social History     Socioeconomic History    Marital status:     Number of children: 3   Occupational History    Occupation:      Employer: [employer]   Tobacco Use    Smoking status:  Never    Smokeless tobacco: Never   Substance and Sexual Activity    Alcohol use: No    Drug use: No    Sexual activity: Yes     Partners: Male     Birth control/protection: Surgical   Social History Narrative    She exercises every day.she has 8 grandchildren.     Social Drivers of Health     Financial Resource Strain: Low Risk  (3/25/2024)    Overall Financial Resource Strain (CARDIA)     Difficulty of Paying Living Expenses: Not very hard   Food Insecurity: No Food Insecurity (3/25/2024)    Hunger Vital Sign     Worried About Running Out of Food in the Last Year: Never true     Ran Out of Food in the Last Year: Never true   Transportation Needs: No Transportation Needs (3/25/2024)    PRAPARE - Transportation     Lack of Transportation (Medical): No     Lack of Transportation (Non-Medical): No   Physical Activity: Insufficiently Active (3/25/2024)    Exercise Vital Sign     Days of Exercise per Week: 2 days     Minutes of Exercise per Session: 30 min   Stress: No Stress Concern Present (3/25/2024)    [employer] [place] [employer] - Occupational Stress Questionnaire     Feeling of Stress : Only a little   Housing Stability: Low Risk  (3/25/2024)    Housing Stability Vital Sign     Unable to Pay for Housing in the Last Year: No     Number of Places Lived in the Last Year: 1     Unstable Housing in the Last Year: No       Current Outpatient Medications   Medication Sig Dispense Refill    acyclovir (ZOVIRAX) 400 MG tablet Take 1 tablet (400 mg total) by mouth 2 (two) times daily. 60 tablet 11    atorvastatin (LIPITOR) 80 MG tablet Take 1 tablet (80 mg total) by mouth once daily. 90 tablet 3    biotin 1 mg tablet Take 1,000 mcg by mouth once daily.      fish oil-omega-3 fatty acids 300-1,000 mg capsule Take 2 g by mouth once daily.      TI ROOT, BULK, MISC by Misc.(Non-Drug; Combo Route) route.      multivitamin capsule Take 1 capsule by mouth once daily.      valsartan (DIOVAN) 160 MG tablet Take 1  "tablet (160 mg total) by mouth once daily. 90 tablet 3    doxycycline (VIBRAMYCIN) 100 MG Cap Take 1 capsule (100 mg total) by mouth every 12 (twelve) hours. for 5 days 10 capsule 0    fluticasone propionate (FLONASE) 50 mcg/actuation nasal spray 1 spray (50 mcg total) by Each Nostril route once daily. 15.8 mL 0    promethazine-dextromethorphan (PROMETHAZINE-DM) 6.25-15 mg/5 mL Syrp Take 5 mLs by mouth 2 (two) times daily as needed (cough). 180 mL 1     No current facility-administered medications for this visit.       Review of patient's allergies indicates:   Allergen Reactions    Adhesive tape-silicones     Crestor [rosuvastatin] Other (See Comments)     Myalgia in legs, has tolerated pravastatin    Pcn [penicillins]          Objective     Vitals:    02/14/25 1555   BP: 138/76   Pulse: 78   Temp: 98.2 °F (36.8 °C)   TempSrc: Oral   SpO2: 98%   Weight: 79.8 kg (175 lb 14.8 oz)   Height: 5' 2" (1.575 m)      Physical Exam  Vitals and nursing note reviewed.   Constitutional:       General: She is not in acute distress.     Appearance: Normal appearance. She is not ill-appearing.   HENT:      Head: Normocephalic and atraumatic.      Right Ear: A middle ear effusion is present. Tympanic membrane is bulging. Tympanic membrane is not perforated.      Left Ear: A middle ear effusion is present. Tympanic membrane is bulging. Tympanic membrane is not perforated.      Ears:      Comments: Bilateral TM bulging with suppurative fluid without perforation.      Nose: Nose normal.      Right Turbinates: Enlarged.      Left Turbinates: Enlarged.      Mouth/Throat:      Mouth: Mucous membranes are moist.      Pharynx: Oropharynx is clear. Postnasal drip present.   Eyes:      General: No scleral icterus.        Right eye: No discharge.         Left eye: No discharge.      Extraocular Movements: Extraocular movements intact.      Conjunctiva/sclera: Conjunctivae normal.   Cardiovascular:      Rate and Rhythm: Normal rate and regular " rhythm.      Pulses: Normal pulses.      Heart sounds: Normal heart sounds. No murmur heard.  Pulmonary:      Effort: Pulmonary effort is normal. No respiratory distress.      Breath sounds: Normal breath sounds. No wheezing.   Abdominal:      General: Abdomen is flat. Bowel sounds are normal. There is no distension.      Palpations: Abdomen is soft. There is no mass.      Tenderness: There is no abdominal tenderness.   Musculoskeletal:         General: Normal range of motion.      Cervical back: Normal range of motion and neck supple.      Right lower leg: No edema.      Left lower leg: No edema.   Skin:     General: Skin is warm and dry.   Neurological:      General: No focal deficit present.      Mental Status: She is alert and oriented to person, place, and time.   Psychiatric:         Mood and Affect: Mood normal.         Behavior: Behavior normal. Behavior is cooperative.         Cognition and Memory: Cognition and memory normal.          Assessment and Plan     1. Cough, unspecified type  -     POCT COVID-19 Rapid Screening  -     POCT Influenza A/B Molecular  -     promethazine-dextromethorphan (PROMETHAZINE-DM) 6.25-15 mg/5 mL Syrp; Take 5 mLs by mouth 2 (two) times daily as needed (cough).  Dispense: 180 mL; Refill: 1    2. Nasal congestion  -     fluticasone propionate (FLONASE) 50 mcg/actuation nasal spray; 1 spray (50 mcg total) by Each Nostril route once daily.  Dispense: 15.8 mL; Refill: 0    3. Acute suppurative otitis media of both ears without spontaneous rupture of tympanic membranes, recurrence not specified  -     doxycycline (VIBRAMYCIN) 100 MG Cap; Take 1 capsule (100 mg total) by mouth every 12 (twelve) hours. for 5 days  Dispense: 10 capsule; Refill: 0    New problem; symptoms started days ago; +sore throat, ear pain, congestion, cough. Denies fever. Swab for flu and covid today. +bilateral bulging TM with suppurative fluid; take antibiotics and other medications as prescribed. Additional  instructions provided in AVS. Stay hydrated. RTC for worsening symptoms. ER precautions advised.     ER/Urgent Care precautions discussed with patient. Go to ER for new or worsening symptoms.         Follow up if symptoms worsen or fail to improve.    34 minutes of total time spent on the encounter, which includes face to face time and non-face to face time preparing to see the patient (eg, review of tests), Obtaining and/or reviewing separately obtained history, Documenting clinical information in the electronic or other health record, Independently interpreting results (not separately reported) and communicating results to the patient/family/caregiver, or Care coordination (not separately reported).      Oliva Mathur, MSN, APRN, FNP-C  [facility]  Office #[phone]

## 2025-02-14 NOTE — PATIENT INSTRUCTIONS
Patient Instructions     ER/Urgent Care precautions discussed with patient. Go to ER for new or worsening symptoms.      Recommend oral antihistamine (claritin, zyrtec, allegra,xyzal)) +/- oral decongestant (pseudoephedrine)  for rhinorrhea/ear congestion for 3 to 5 days if blood pressure is <130/80mmhg, steroid nasal spray (flonase) +/- , prescription (promethazine-DM) at night due to drowsiness  /OTC cough medicine (theraflu or Mucinex Dm 12 hour or Coricidin HBP® Maximum Strength Cold & Flu Day),  Tylenol (Acetaminophen) and/or Motrin (Ibuprofen) as directed for control of pain and/or fever.        Please drink plenty of fluids.  Please get plenty of rest.  Nasal irrigation with a saline spray or Netti Pot like device per their directions is also recommended.  If you  smoke, please stop smoking.     To help ease a sore throat, you can:  Use a sore throat spray.  Suck on hard candy or throat lozenges.  Gargle with warm saltwater a few times each day. Mix of 1/4 teaspoon (1.25 grams) salt in 8 ounces (240 mL) of warm water.  Use a cool mist humidifier to help you breathe easier.     If you negative (-) for a COVID test today and you are continuing to have symptoms, it is recommended to repeat the test in 48 hours x 3. If you continue to be negative, you may return to school/work once you have improved symptoms and no fever for 24 hours without any medications. This applies to all viral illnesses.         Discussed prescriptions and over-the-counter medicines to help with patient's symptoms:  A steroid nose spray (flonase) and antihistamine nasal spray (azelastine) can help with a stuffy nose. It can also help with drainage down the back of your throat.  An antihistamine (loratadine,zyrtec,allegra, xyzal) can help with itching, sneezing, or runny nose.  An antihistamine eye drop can help with itchy eyes.  A decongestant (pseudoephedrine,  Phenylephrine, oxymetazoline aka afrin nasal spray) can help with a stuffy  nose. Take <10 days for congestion and rhinorrhea. Once symptoms improve, proceed with loratadine/zyrtec once a day. These ingredients can keep you up all night, decrease appetite, feel jittery, and raise blood pressure with long term use.  OTC Coricidin can be used for patients with hypertension and palpitations because you cannot use ingredients such as pseudoephedrine and phenylephrine for oral decongestants.  Coricidin HBP Cough & Cold (Chlorpheniramine/Dextromethorphan)  Coricidin HBP Maximum Strength Multi-Symptom Flu  (Acetaminophen,Dextromethorphan, Chlorpheniramine)  Coricidin HBP® Maximum Strength Cold & Flu Day/Night (Acetaminophen,Dextromethorphan, Doxylamine, Guaifenesin)  Coricidin HBP Chest Congestion & Cough (Dextromethorphan + Guaifenesin)  Mucinex DM: Guaifenesin,Dextromethorphan     Medications that control cough are suppressants and expectorants. Suppressants are tessalon pearls and dextromethorphan. If you have a productive cough with sputum, you need an expectorant called guaifenesin. Dextromethorphan and Guaifenesin are active ingredients in many OTC cough/cold medications such as Dayquil/Nyquil, Mucinex, and Robitussin Mucus+Chest Congestion.             Common Cold Medicine Ingredients Cheat sheet  Acetaminophen (APAP) -pain reliever/fever reducer  Dextromethorphan - cough suppressant  Guaifenesin - expectorant/thins and loosens mucus  Phenylephrine - nasal decongestant  Diphenhydramine or Doxylamine succinate - antihistamine, helps you fall asleep  Promethazine or Brompheniramine - Prescription strength antihistamines     If not allergic, take Tylenol (Acetaminophen) 650 mg to  1 g every 6 hours as needed  and/or Motrin (Ibuprofen) 600 to 800 mg every 6 hours as needed for fever or pain.     If your blood pressure was elevated during your visit and this was discussed with you, please obtain a home blood pressure cuff and take your blood pressure once daily for two weeks, record values and  follow up with your PCP. If you were started on blood pressure medication today, ensure you obtain a follow up appointment with your PCP in 5-7 days for a re-check, if you develop shortness of breath, severe headache, weakness, chest pain, vision problems after leaving urgent care, call 911 and go to the ER immediately.

## 2025-02-20 ENCOUNTER — PATIENT MESSAGE (OUTPATIENT)
Dept: FAMILY MEDICINE | Facility: CLINIC | Age: 70
End: 2025-02-20
Payer: MEDICARE

## 2025-02-27 ENCOUNTER — CLINICAL SUPPORT (OUTPATIENT)
Dept: AUDIOLOGY | Facility: CLINIC | Age: 70
End: 2025-02-27
Payer: MEDICARE

## 2025-02-27 ENCOUNTER — OFFICE VISIT (OUTPATIENT)
Dept: OTOLARYNGOLOGY | Facility: CLINIC | Age: 70
End: 2025-02-27
Payer: MEDICARE

## 2025-02-27 DIAGNOSIS — H91.93 HIGH FREQUENCY HEARING LOSS, BILATERAL: ICD-10-CM

## 2025-02-27 DIAGNOSIS — H61.23 BILATERAL IMPACTED CERUMEN: ICD-10-CM

## 2025-02-27 DIAGNOSIS — H91.93 HIGH FREQUENCY HEARING LOSS OF BOTH EARS: Primary | ICD-10-CM

## 2025-02-27 DIAGNOSIS — H93.8X3 SENSATION OF FULLNESS IN BOTH EARS: Primary | ICD-10-CM

## 2025-02-27 PROCEDURE — 92557 COMPREHENSIVE HEARING TEST: CPT | Mod: S$GLB,,, | Performed by: AUDIOLOGIST

## 2025-02-27 PROCEDURE — 92567 TYMPANOMETRY: CPT | Mod: S$GLB,,, | Performed by: AUDIOLOGIST

## 2025-02-27 PROCEDURE — 99999 PR PBB SHADOW E&M-EST. PATIENT-LVL II: CPT | Mod: PBBFAC,,,

## 2025-02-27 NOTE — PROGRESS NOTES
Rachael June was seen today in the clinic for an audiologic evaluation.  Patient's main complaint was {main complaint:64367} ***. Mrs. June has history of a mild to moderate high frequency hearing loss, bilaterally. She reported ***. Mrs. June denied {aud symptoms denied:52443}    Tympanometry revealed {tymp findings:21356} in the right ear and {tymp findings:68857} in the left ear.     Audiogram results revealed a *** {HL type:94151} in the right ear and a *** {HL type abbrev:14672} in the left ear.      Speech reception thresholds were noted at *** dBHL in the right ear and *** dBHL in the left ear.    Speech discrimination scores were ***% in the right ear and ***% in the left ear.    Recommendations:  Otologic evaluation  Hearing aid consultation  Annual audiogram or sooner if change perceived  Hearing protection in noise

## 2025-02-27 NOTE — PROGRESS NOTES
Subjective:   Rachael June is a 69 y.o. female with PMHx of HTN and HLD who presents today for ear pain and aural fullness. Patient reports a dull ear ache for the last 2 weeks in both ears. She has had associated nasal congestion, sore throat, cough and PND. Was seen by PCP who noted middle ear effusion in both ears and treated patient with Doxycycline x 5 days and Flonase. She has not had improvement in ear symptoms. Denies rhinorrhea, facial pressure or fever. Denies otorrhea or hearing concerns. There is not a prior history of ear surgery. There is not a prior history of ear infections. There is not a history of ear trauma. She denies a history of significant noise exposure.     Past Medical History  She has a past medical history of Abnormal Pap smear of cervix, Colon polyp, Condition not found, Dysplasia of vagina, Genital herpes, unspecified, GERD (gastroesophageal reflux disease), HTN (hypertension), HTN (hypertension), Hyperlipidemia, Insulinoma, and Special screening for malignant neoplasms, colon.    Past Surgical History  She has a past surgical history that includes insulinoma resection; Hysterectomy; Shoulder arthroscopy (Left, 9/23/2015); Colonoscopy w/ polypectomy; Colonoscopy (N/A, 6/8/2017); and Esophagogastroduodenoscopy (N/A, 6/26/2024).    Family History  Her family history includes Breast cancer in her sister; Cancer in her maternal aunt and sister; Diabetes in her brother, father, mother, sister, and sister; Heart disease in her brother, father, and mother; Hyperlipidemia in her mother; Hypertension in her mother; Stroke in her father.    Social History  She reports that she has never smoked. She has never used smokeless tobacco. She reports that she does not drink alcohol and does not use drugs.    Allergies  She is allergic to adhesive tape-silicones, crestor [rosuvastatin], and pcn [penicillins].    Medications  She has a current medication list which includes the following  prescription(s): acyclovir, atorvastatin, biotin, fish oil-omega-3 fatty acids, fluticasone propionate, bri root, multivitamin, promethazine-dextromethorphan, and valsartan.    Review of Systems     Constitutional: Negative for appetite change, chills, fatigue, fever and unexpected weight loss.      HENT: Positive for ear discharge, ear infection, ear pain, sinus pressure and sore throat.  Negative for hearing loss and ringing in the ears.      Eyes:  Negative for change in eyesight, eye drainage, eye itching and photophobia.     Respiratory:  Positive for cough and sleep apnea.      Cardiovascular:  Positive for foot swelling.     Gastrointestinal:  Positive for acid reflux and heartburn.     Genitourinary: Negative for difficulty urinating, sexual problems and frequent urination.     Musc: Positive for aching muscles and back pain.     Skin: Negative for rash.     Allergy: Negative for food allergies and seasonal allergies.     Endocrine: Negative for cold intolerance and heat intolerance.      Neurological: Negative for dizziness, headaches, light-headedness, seizures and tremors.      Hematologic: Negative for bruises/bleeds easily and swollen glands.      Psychiatric: Negative for decreased concentration, depression, nervous/anxious and sleep disturbance.          Objective:       Head:  Normocephalic.     Ears:    Right Ear: No drainage, swelling or tenderness. Tympanic membrane is not injected, not scarred, not perforated, not erythematous and not retracted. No middle ear effusion.   Left Ear: No drainage, swelling or tenderness. Tympanic membrane is not injected, not scarred, not perforated, not erythematous and not retracted.  No middle ear effusion.   Ceruminous debris obstructing bilateral TM (L>R), removed via microscopy.    Nose:  Nose normal including turbinates, nasal mucosa, sinuses and nasal septum.     Mouth/Throat  Oropharynx clear and moist without lesions or asymmetry.     Pulmonary/Chest:    Effort normal.       Procedure  Cerumen removal performed.  See procedure note.  Procedure Note:  The patient was brought to the minor procedure room and placed under the operating microscope of the right and left ear canal which was cleaned of ceruminous debris. Using a combination of suction, curettes and cup forceps the patient's cerumen was removed. The patient tolerated the procedure well. There were no complications.     Audiology       I independently reviewed the tracings of the complete audiometric evaluation performed today. I reviewed the audiogram with the patient as well. Pertinent findings include: normal sloping to moderate high frequency SNHL AU. SRT 10 dBHL AU. Speech discrimination 100% AU. Type A tymp AU.      Assessment:     1. Sensation of fullness in both ears    2. Bilateral impacted cerumen    3. High frequency hearing loss, bilateral      Plan:   Rachael was seen today for otitis media.  Diagnoses and all orders for this visit:    Sensation of fullness in both ears  Bilateral impacted cerumen  Cerumen removed via microscopy with improvement in aural fullness and pain. Reassure. Benign otoscopic exam after cleaning with no evidence of effusion, infection, perforation or retraction. RTC PRN.    High frequency hearing loss, bilateral  Audiometric testing interpretation consistent with sensorineural hearing loss. Discussed the etiology of SNHL. Hearing conservation in noisy environments. RTC in 2-3 years for routine audiogram or sooner if needed.

## 2025-02-27 NOTE — PROGRESS NOTES
Rachael June, a 69 y.o. female, was seen today in the clinic for an audiologic evaluation.  The patient's main complaint was otalgia.  Mrs. June reported that she has been experiencing bilateral otalgia for several weeks.  She also noted that she feels she is not hearing as well as she once did, particularly when speakers have their backs turned.  She denied tinnitus and vertigo.     Tympanometry revealed Type A in the right ear and Type A in the left ear. Audiogram results revealed normal sloping to moderate high frequency hearing loss at 8000 Hz bilaterally.  Speech reception thresholds were noted at 10 dB in the right ear and 10 dB in the left ear.  Speech discrimination scores were 96% in the right ear and 96% in the left ear.    Recommendations:  Otologic evaluation  Annual audiogram  Hearing protection when in noise

## 2025-03-24 DIAGNOSIS — Z00.00 ENCOUNTER FOR MEDICARE ANNUAL WELLNESS EXAM: ICD-10-CM

## 2025-04-04 ENCOUNTER — OFFICE VISIT (OUTPATIENT)
Dept: PODIATRY | Facility: CLINIC | Age: 70
End: 2025-04-04
Payer: MEDICARE

## 2025-04-04 VITALS
SYSTOLIC BLOOD PRESSURE: 127 MMHG | HEIGHT: 61 IN | DIASTOLIC BLOOD PRESSURE: 77 MMHG | HEART RATE: 73 BPM | WEIGHT: 177.5 LBS | BODY MASS INDEX: 33.51 KG/M2

## 2025-04-04 DIAGNOSIS — M79.671 FOOT PAIN, BILATERAL: Primary | ICD-10-CM

## 2025-04-04 DIAGNOSIS — M79.672 FOOT PAIN, BILATERAL: Primary | ICD-10-CM

## 2025-04-04 PROCEDURE — 99999 PR PBB SHADOW E&M-EST. PATIENT-LVL III: CPT | Mod: PBBFAC,,, | Performed by: PODIATRIST

## 2025-04-04 RX ORDER — METHYLPREDNISOLONE 4 MG/1
TABLET ORAL
Qty: 1 EACH | Refills: 0 | Status: SHIPPED | OUTPATIENT
Start: 2025-04-04 | End: 2025-04-25

## 2025-04-04 NOTE — PROGRESS NOTES
Subjective:      Patient ID: Rachael June is a 69 y.o. female.    Chief Complaint: Foot Pain (Bl foot pain feel like a pull muscle and burning pain on and off)    Rachael is a 69 y.o. female who presents to the podiatry clinic  with complaint of  bilateral foot pain. Onset of the symptoms was several months ago. Precipitating event: none known. Current symptoms include: ability to bear weight, but with some pain. Aggravating factors: any weight bearing. Symptoms have progressed to a point and plateaued. Patient has had no prior foot problems. Evaluation to date: none. Treatment to date: none. Patients rates pain 4/10 on pain scale.    Review of Systems   Constitutional: Negative for chills, fever and malaise/fatigue.   HENT:  Negative for hearing loss.    Cardiovascular:  Positive for leg swelling. Negative for claudication.   Respiratory:  Negative for shortness of breath.    Skin:  Negative for flushing and rash.   Musculoskeletal:  Negative for joint pain and myalgias.   Neurological:  Negative for loss of balance, numbness, paresthesias and sensory change.   Psychiatric/Behavioral:  Negative for altered mental status.            Objective:      Physical Exam  Vitals reviewed.   Cardiovascular:      Pulses:           Dorsalis pedis pulses are 2+ on the right side and 2+ on the left side.        Posterior tibial pulses are 2+ on the right side and 2+ on the left side.      Comments: No edema noted b/L  Musculoskeletal:         General: Tenderness present. No signs of injury.      Comments:   POP to left lateral foot     Feet:      Right foot:      Protective Sensation: 5 sites tested.  5 sites sensed.      Left foot:      Protective Sensation: 5 sites tested.  5 sites sensed.   Skin:     Capillary Refill: Capillary refill takes 2 to 3 seconds.      Comments: Normal skin tugor noted.   No open lesion noted b/L  Skin temp is warm to warm from proximal to distal b/L.  Webspaces clean, dry, and intact      Neurological:      Mental Status: She is alert and oriented to person, place, and time.      Comments: Gross sensation intact b/L               Assessment:       No diagnosis found.      Plan:       There are no diagnoses linked to this encounter.  I counseled the patient on her conditions, their implications and medical management.      Pt advised to wear compression stockings for lower extremity swelling.     Pt advised pain is likely due to tendinitis and inflammation    Rx medrol dose pack    Pt advised to elevate feet  Call or return to clinic prn if these symptoms worsen or fail to improve as anticipated.    .

## 2025-04-22 ENCOUNTER — PATIENT MESSAGE (OUTPATIENT)
Dept: OBSTETRICS AND GYNECOLOGY | Facility: CLINIC | Age: 70
End: 2025-04-22
Payer: MEDICARE

## 2025-05-07 ENCOUNTER — OFFICE VISIT (OUTPATIENT)
Dept: OBSTETRICS AND GYNECOLOGY | Facility: CLINIC | Age: 70
End: 2025-05-07
Payer: MEDICARE

## 2025-05-07 VITALS
DIASTOLIC BLOOD PRESSURE: 72 MMHG | SYSTOLIC BLOOD PRESSURE: 122 MMHG | BODY MASS INDEX: 33.37 KG/M2 | WEIGHT: 176.56 LBS

## 2025-05-07 DIAGNOSIS — N63.0 BREAST MASS IN FEMALE: ICD-10-CM

## 2025-05-07 DIAGNOSIS — N64.4 BREAST PAIN: Primary | ICD-10-CM

## 2025-05-07 PROCEDURE — 3008F BODY MASS INDEX DOCD: CPT | Mod: CPTII,S$GLB,, | Performed by: OBSTETRICS & GYNECOLOGY

## 2025-05-07 PROCEDURE — 99999 PR PBB SHADOW E&M-EST. PATIENT-LVL III: CPT | Mod: PBBFAC,,, | Performed by: OBSTETRICS & GYNECOLOGY

## 2025-05-07 PROCEDURE — 1125F AMNT PAIN NOTED PAIN PRSNT: CPT | Mod: CPTII,S$GLB,, | Performed by: OBSTETRICS & GYNECOLOGY

## 2025-05-07 PROCEDURE — 3074F SYST BP LT 130 MM HG: CPT | Mod: CPTII,S$GLB,, | Performed by: OBSTETRICS & GYNECOLOGY

## 2025-05-07 PROCEDURE — 3044F HG A1C LEVEL LT 7.0%: CPT | Mod: CPTII,S$GLB,, | Performed by: OBSTETRICS & GYNECOLOGY

## 2025-05-07 PROCEDURE — 1159F MED LIST DOCD IN RCRD: CPT | Mod: CPTII,S$GLB,, | Performed by: OBSTETRICS & GYNECOLOGY

## 2025-05-07 PROCEDURE — 99212 OFFICE O/P EST SF 10 MIN: CPT | Mod: S$GLB,,, | Performed by: OBSTETRICS & GYNECOLOGY

## 2025-05-07 PROCEDURE — 4010F ACE/ARB THERAPY RXD/TAKEN: CPT | Mod: CPTII,S$GLB,, | Performed by: OBSTETRICS & GYNECOLOGY

## 2025-05-07 PROCEDURE — 3078F DIAST BP <80 MM HG: CPT | Mod: CPTII,S$GLB,, | Performed by: OBSTETRICS & GYNECOLOGY

## 2025-05-07 NOTE — PROGRESS NOTES
9 yo female s/p HYST many years ago for fibroids who presents for evaluation of left breast pain/mass.  Reports pain x 3 weeks.  Feels pain and mass in the left axiallary area.  No other complaints.    PE:   Vitals: /72 (Patient Position: Sitting)   Wt 80.1 kg (176 lb 9.4 oz)   BMI 33.37 kg/m²   APPEARANCE: Well nourished, well developed, in no acute distress.  BREASTS: Symmetrical, no skin changes or visible lesions. No palpable masses, nipple discharge or adenopathy bilaterally.  PELVIC: deferred    AP: Left breast pain/mass  Diagnostic mammogram ordered; U/S ordered    S MD mary

## 2025-05-20 ENCOUNTER — HOSPITAL ENCOUNTER (OUTPATIENT)
Dept: RADIOLOGY | Facility: HOSPITAL | Age: 70
Discharge: HOME | End: 2025-05-20
Attending: OBSTETRICS & GYNECOLOGY
Payer: MEDICARE

## 2025-05-20 DIAGNOSIS — N63.0 BREAST MASS IN FEMALE: ICD-10-CM

## 2025-05-20 DIAGNOSIS — N64.4 BREAST PAIN: ICD-10-CM

## 2025-05-20 PROCEDURE — 76642 ULTRASOUND BREAST LIMITED: CPT | Mod: 26,LT,, | Performed by: RADIOLOGY

## 2025-05-20 PROCEDURE — 76642 ULTRASOUND BREAST LIMITED: CPT | Mod: TC,LT

## 2025-05-20 PROCEDURE — 77065 DX MAMMO INCL CAD UNI: CPT | Mod: TC,LT

## 2025-06-10 RX ORDER — ATORVASTATIN CALCIUM 80 MG/1
TABLET, FILM COATED ORAL
Qty: 90 TABLET | Refills: 2 | Status: SHIPPED | OUTPATIENT
Start: 2025-06-10

## 2025-06-10 NOTE — TELEPHONE ENCOUNTER
No care due was identified.  [facility] Embedded Care Due Messages. Reference number: [id number].   6/10/2025 5:54:12 AM CDT

## 2025-06-10 NOTE — TELEPHONE ENCOUNTER
Refill Decision Note   Rachael June  is requesting a refill authorization.  Brief Assessment and Rationale for Refill:  Approve     Medication Therapy Plan:         Comments:     Note composed:9:48 AM 06/10/2025

## 2025-06-23 ENCOUNTER — LAB VISIT (OUTPATIENT)
Dept: LAB | Facility: HOSPITAL | Age: 70
End: 2025-06-23
Attending: FAMILY MEDICINE
Payer: MEDICARE

## 2025-06-23 ENCOUNTER — RESULTS FOLLOW-UP (OUTPATIENT)
Dept: FAMILY MEDICINE | Facility: CLINIC | Age: 70
End: 2025-06-23

## 2025-06-23 ENCOUNTER — OFFICE VISIT (OUTPATIENT)
Dept: FAMILY MEDICINE | Facility: CLINIC | Age: 70
End: 2025-06-23
Payer: MEDICARE

## 2025-06-23 VITALS
OXYGEN SATURATION: 98 % | DIASTOLIC BLOOD PRESSURE: 72 MMHG | WEIGHT: 178.13 LBS | TEMPERATURE: 98 F | BODY MASS INDEX: 33.63 KG/M2 | HEART RATE: 78 BPM | HEIGHT: 61 IN | SYSTOLIC BLOOD PRESSURE: 124 MMHG

## 2025-06-23 DIAGNOSIS — R10.9 ABDOMINAL PAIN, UNSPECIFIED ABDOMINAL LOCATION: ICD-10-CM

## 2025-06-23 DIAGNOSIS — R10.9 FLANK PAIN: Primary | ICD-10-CM

## 2025-06-23 DIAGNOSIS — M54.50 ACUTE LEFT-SIDED LOW BACK PAIN WITHOUT SCIATICA: ICD-10-CM

## 2025-06-23 LAB
ABSOLUTE EOSINOPHIL (OHS): 0.07 K/UL
ABSOLUTE MONOCYTE (OHS): 0.64 K/UL (ref 0.3–1)
ABSOLUTE NEUTROPHIL COUNT (OHS): 3.41 K/UL (ref 1.8–7.7)
ALBUMIN SERPL BCP-MCNC: 4.1 G/DL (ref 3.5–5.2)
ALP SERPL-CCNC: 125 UNIT/L (ref 40–150)
ALT SERPL W/O P-5'-P-CCNC: 30 UNIT/L (ref 10–44)
ANION GAP (OHS): 9 MMOL/L (ref 8–16)
AST SERPL-CCNC: 21 UNIT/L (ref 11–45)
BASOPHILS # BLD AUTO: 0.04 K/UL
BASOPHILS NFR BLD AUTO: 0.5 %
BILIRUB SERPL-MCNC: 0.7 MG/DL (ref 0.1–1)
BUN SERPL-MCNC: 14 MG/DL (ref 8–23)
CALCIUM SERPL-MCNC: 10.2 MG/DL (ref 8.7–10.5)
CHLORIDE SERPL-SCNC: 107 MMOL/L (ref 95–110)
CO2 SERPL-SCNC: 25 MMOL/L (ref 23–29)
CREAT SERPL-MCNC: 0.8 MG/DL (ref 0.5–1.4)
ERYTHROCYTE [DISTWIDTH] IN BLOOD BY AUTOMATED COUNT: 12.9 % (ref 11.5–14.5)
GFR SERPLBLD CREATININE-BSD FMLA CKD-EPI: >60 ML/MIN/1.73/M2
GLUCOSE SERPL-MCNC: 96 MG/DL (ref 70–110)
HCT VFR BLD AUTO: 42.3 % (ref 37–48.5)
HGB BLD-MCNC: 13.5 GM/DL (ref 12–16)
IMM GRANULOCYTES # BLD AUTO: 0.02 K/UL (ref 0–0.04)
IMM GRANULOCYTES NFR BLD AUTO: 0.3 % (ref 0–0.5)
LYMPHOCYTES # BLD AUTO: 3.42 K/UL (ref 1–4.8)
MCH RBC QN AUTO: 29.7 PG (ref 27–31)
MCHC RBC AUTO-ENTMCNC: 31.9 G/DL (ref 32–36)
MCV RBC AUTO: 93 FL (ref 82–98)
NUCLEATED RBC (/100WBC) (OHS): 0 /100 WBC
PLATELET # BLD AUTO: 281 K/UL (ref 150–450)
PMV BLD AUTO: 10.9 FL (ref 9.2–12.9)
POTASSIUM SERPL-SCNC: 3.9 MMOL/L (ref 3.5–5.1)
PROT SERPL-MCNC: 8 GM/DL (ref 6–8.4)
RBC # BLD AUTO: 4.55 M/UL (ref 4–5.4)
RELATIVE EOSINOPHIL (OHS): 0.9 %
RELATIVE LYMPHOCYTE (OHS): 45 % (ref 18–48)
RELATIVE MONOCYTE (OHS): 8.4 % (ref 4–15)
RELATIVE NEUTROPHIL (OHS): 44.9 % (ref 38–73)
SODIUM SERPL-SCNC: 141 MMOL/L (ref 136–145)
WBC # BLD AUTO: 7.6 K/UL (ref 3.9–12.7)

## 2025-06-23 PROCEDURE — 99999 PR PBB SHADOW E&M-EST. PATIENT-LVL IV: CPT | Mod: PBBFAC,,, | Performed by: FAMILY MEDICINE

## 2025-06-23 PROCEDURE — 1159F MED LIST DOCD IN RCRD: CPT | Mod: CPTII,S$GLB,, | Performed by: FAMILY MEDICINE

## 2025-06-23 PROCEDURE — 85025 COMPLETE CBC W/AUTO DIFF WBC: CPT

## 2025-06-23 PROCEDURE — 1101F PT FALLS ASSESS-DOCD LE1/YR: CPT | Mod: CPTII,S$GLB,, | Performed by: FAMILY MEDICINE

## 2025-06-23 PROCEDURE — 3008F BODY MASS INDEX DOCD: CPT | Mod: CPTII,S$GLB,, | Performed by: FAMILY MEDICINE

## 2025-06-23 PROCEDURE — 3044F HG A1C LEVEL LT 7.0%: CPT | Mod: CPTII,S$GLB,, | Performed by: FAMILY MEDICINE

## 2025-06-23 PROCEDURE — 36415 COLL VENOUS BLD VENIPUNCTURE: CPT

## 2025-06-23 PROCEDURE — 3074F SYST BP LT 130 MM HG: CPT | Mod: CPTII,S$GLB,, | Performed by: FAMILY MEDICINE

## 2025-06-23 PROCEDURE — 99214 OFFICE O/P EST MOD 30 MIN: CPT | Mod: S$GLB,,, | Performed by: FAMILY MEDICINE

## 2025-06-23 PROCEDURE — 84460 ALANINE AMINO (ALT) (SGPT): CPT

## 2025-06-23 PROCEDURE — 1125F AMNT PAIN NOTED PAIN PRSNT: CPT | Mod: CPTII,S$GLB,, | Performed by: FAMILY MEDICINE

## 2025-06-23 PROCEDURE — 3078F DIAST BP <80 MM HG: CPT | Mod: CPTII,S$GLB,, | Performed by: FAMILY MEDICINE

## 2025-06-23 PROCEDURE — 4010F ACE/ARB THERAPY RXD/TAKEN: CPT | Mod: CPTII,S$GLB,, | Performed by: FAMILY MEDICINE

## 2025-06-23 PROCEDURE — 3288F FALL RISK ASSESSMENT DOCD: CPT | Mod: CPTII,S$GLB,, | Performed by: FAMILY MEDICINE

## 2025-06-23 RX ORDER — IBUPROFEN 600 MG/1
600 TABLET, FILM COATED ORAL EVERY 8 HOURS PRN
Qty: 30 TABLET | Refills: 1 | Status: SHIPPED | OUTPATIENT
Start: 2025-06-23

## 2025-06-23 NOTE — PROGRESS NOTES
(Portions of this note were dictated using voice recognition software and may contain dictation related errors in spelling/grammar/syntax not found on text review)    CC:   Chief Complaint   Patient presents with    Pain     Lower back and        HPI: 69 y.o. female presented for evaluation of flank pain and back pain as a new patient to me.  She has medical history significant for hypertension, hyperlipidemia, colonic polyp.    Patient stated that she started to have left-sided back pain on Friday last week, describes as achiness and discomforting sensation, has been constant, radiating towards the flank on the left side as well as hip area, has no associated symptoms of fever, chills, nausea or vomiting.  Has urine frequency at baseline as she is trying to drink enough water, denies having any burning, dysuria and pain associated with urination.    She has been taking ibuprofen 400 mg with some relief, denies any over exertional activities or lifting weight recently, has no prior history of back pain.    She was having constipation recently and added Metamucil with good relief    She is concerned about possibility of kidney stones.      She denies having any other symptoms or concerns    Past Medical History:   Diagnosis Date    Abnormal Pap smear of cervix     Colon polyp     Condition not found     h/o abnl vaginal cuff biopsy-mild dysplasia    Dysplasia of vagina     vaginal cuff biopsy    Genital herpes, unspecified     GERD (gastroesophageal reflux disease)     HTN (hypertension)     HTN (hypertension) 02/26/2024    Hyperlipidemia     Insulinoma     s/p resection    Special screening for malignant neoplasms, colon 07/06/2015       Past Surgical History:   Procedure Laterality Date    COLONOSCOPY N/A 6/8/2017    Procedure: COLONOSCOPY;  Surgeon: Chadd Davis MD;  Location: [address]);  Service: Endoscopy;  Laterality: N/A;    COLONOSCOPY W/ POLYPECTOMY      ESOPHAGOGASTRODUODENOSCOPY N/A 6/26/2024     Procedure: EGD (ESOPHAGOGASTRODUODENOSCOPY);  Surgeon: Leonardo Maradiaga MD;  Location: [facility] ENDOSCOPY;  Service: Endoscopy;  Laterality: N/A;  urgent egd / dr maradiaga pt seen in clinic on 5/16/prep instructions sent to pt via portal   pt request procedure be scheduled after her PET scan  6/18/24- pc complete. DBM    HYSTERECTOMY      fibroids    insulinoma resection      SHOULDER ARTHROSCOPY Left 9/23/2015    Dr Rm        Family History   Problem Relation Name Age of Onset    Heart disease Mother      Hypertension Mother      Diabetes Mother      Hyperlipidemia Mother      Stroke Father age 80     Diabetes Father age 80     Heart disease Father age 80     Cancer Sister          breast cancer    Diabetes Sister      Breast cancer Sister      Cancer Maternal Aunt          ovarian cancer    Diabetes Brother      Diabetes Sister      Heart disease Brother         Social History[1]    Lab Results   Component Value Date    WBC 5.98 01/24/2025    HGB 13.8 01/24/2025    HCT 42.3 01/24/2025    MCV 93 01/24/2025     01/24/2025    CHOL 162 01/24/2025    TRIG 67 01/24/2025    HDL 63 01/24/2025    ALT 38 01/24/2025    AST 27 01/24/2025    BILITOT 0.9 01/24/2025    ALKPHOS 129 01/24/2025     01/24/2025    K 4.1 01/24/2025     01/24/2025    CREATININE 0.8 01/24/2025    ESTGFRAFRICA >60.0 07/15/2022    EGFRNONAA >60.0 07/15/2022    CALCIUM 9.9 01/24/2025    ALBUMIN 4.1 01/24/2025    BUN 13 01/24/2025    CO2 28 01/24/2025    TSH 1.361 01/24/2025    INR 1.0 01/15/2015    HGBA1C 6.1 (H) 01/24/2025    MICALBCREAT 10.0 10/29/2008    LDLCALC 85.6 01/24/2025    GLU 91 01/24/2025    [id number] 35 06/06/2017             Vital signs reviewed  PE:   APPEARANCE: Well nourished, well developed, in no acute distress.    HEAD: Normocephalic, atraumatic.  EYES: EOMI.  Conjunctivae noninjected.  NOSE: Mucosa pink. Airway clear.  NECK: Supple with no cervical lymphadenopathy.    CHEST: Good inspiratory effort. Lungs  clear to auscultation with no wheezes or crackles.  CARDIOVASCULAR: Normal S1, S2. No rubs, murmurs, or gallops.  ABDOMEN: Bowel sounds normal. Not distended. Soft. TTP in left flank, No organomegaly.  EXTREMITIES: No edema, cyanosis, or clubbing.    Review of Systems   Constitutional:  Negative for chills, fatigue and fever.   HENT: Negative.     Respiratory:  Negative for cough, shortness of breath and wheezing.    Cardiovascular:  Negative for chest pain, palpitations and leg swelling.   Gastrointestinal:  Negative for abdominal pain, change in bowel habit, constipation, rectal pain, vomiting and reflux.   Genitourinary:  Positive for flank pain and frequency.   Musculoskeletal:  Positive for back pain.   Neurological: Negative.    Psychiatric/Behavioral: Negative.     All other systems reviewed and are negative.      IMPRESSION  1. Flank pain    2. Acute left-sided low back pain without sciatica            PLAN        1. Acute left-sided low back pain without sciatica    - CT Renal Stone Study ABD Pelvis WO; Future    - Urinalysis, Reflex to Urine Culture Urine, Clean Catch; Future    - CBC Auto Differential; Future    - Comprehensive Metabolic Panel; Future    - ibuprofen (ADVIL,MOTRIN) 600 MG tablet; Take 1 tablet (600 mg total) by mouth every 8 (eight) hours as needed for Pain.  Dispense: 30 tablet; Refill: 1      2. Flank pain (Primary)    - CT Renal Stone Study ABD Pelvis WO; Future    - Urinalysis, Reflex to Urine Culture Urine, Clean Catch; Future    - CBC Auto Differential; Future    - Comprehensive Metabolic Panel; Future    - ibuprofen (ADVIL,MOTRIN) 600 MG tablet; Take 1 tablet (600 mg total) by mouth every 8 (eight) hours as needed for Pain.  Dispense: 30 tablet; Refill: 1     Differentials including lumbar strain, kidney stones, UTI, constipation considered     Blood work up, urine test and CT scan ordered     Advised to take ibuprofen 600 mg as needed for pain       Return precautions  given        SCREENINGS        Age/demographic appropriate health maintenance:    Health Maintenance Due   Topic Date Due    RSV Vaccine (Age 60+ and Pregnant patients) (1 - Risk 60-74 years 1-dose series) Never done    DEXA Scan  08/24/2024           Spent adequate time in obtaining history and explaining differentials     35 minutes spent during this visit of which greater than 50% devoted to face-face counseling and coordination of care regarding diagnosis and management plan       Lacho Combs   6/23/2025       [1]   Social History  Tobacco Use    Smoking status: Never    Smokeless tobacco: Never   Substance Use Topics    Alcohol use: No    Drug use: No

## 2025-06-24 ENCOUNTER — HOSPITAL ENCOUNTER (OUTPATIENT)
Dept: RADIOLOGY | Facility: HOSPITAL | Age: 70
Discharge: HOME | End: 2025-06-24
Attending: FAMILY MEDICINE
Payer: MEDICARE

## 2025-06-24 DIAGNOSIS — R10.9 FLANK PAIN: ICD-10-CM

## 2025-06-24 DIAGNOSIS — M54.50 ACUTE LEFT-SIDED LOW BACK PAIN WITHOUT SCIATICA: ICD-10-CM

## 2025-06-24 PROCEDURE — 74176 CT ABD & PELVIS W/O CONTRAST: CPT | Mod: 26,,, | Performed by: RADIOLOGY

## 2025-06-24 PROCEDURE — 74176 CT ABD & PELVIS W/O CONTRAST: CPT | Mod: TC

## 2025-06-30 ENCOUNTER — RESULTS FOLLOW-UP (OUTPATIENT)
Dept: OBSTETRICS AND GYNECOLOGY | Facility: CLINIC | Age: 70
End: 2025-06-30

## 2025-07-07 ENCOUNTER — OFFICE VISIT (OUTPATIENT)
Dept: FAMILY MEDICINE | Facility: CLINIC | Age: 70
End: 2025-07-07
Payer: MEDICARE

## 2025-07-07 VITALS
WEIGHT: 178.81 LBS | DIASTOLIC BLOOD PRESSURE: 66 MMHG | HEART RATE: 86 BPM | TEMPERATURE: 98 F | SYSTOLIC BLOOD PRESSURE: 130 MMHG | BODY MASS INDEX: 33.76 KG/M2 | OXYGEN SATURATION: 98 % | HEIGHT: 61 IN

## 2025-07-07 DIAGNOSIS — A60.00 GENITAL HERPES SIMPLEX, UNSPECIFIED SITE: ICD-10-CM

## 2025-07-07 DIAGNOSIS — Z12.31 OTHER SCREENING MAMMOGRAM: ICD-10-CM

## 2025-07-07 DIAGNOSIS — Z00.00 ROUTINE GENERAL MEDICAL EXAMINATION AT A HEALTH CARE FACILITY: ICD-10-CM

## 2025-07-07 DIAGNOSIS — Z78.0 ASYMPTOMATIC MENOPAUSAL STATE: ICD-10-CM

## 2025-07-07 DIAGNOSIS — I70.0 ATHEROSCLEROSIS OF AORTA: ICD-10-CM

## 2025-07-07 DIAGNOSIS — R73.01 IFG (IMPAIRED FASTING GLUCOSE): Primary | ICD-10-CM

## 2025-07-07 DIAGNOSIS — I10 HYPERTENSION, UNSPECIFIED TYPE: ICD-10-CM

## 2025-07-07 DIAGNOSIS — G47.33 OSA (OBSTRUCTIVE SLEEP APNEA): ICD-10-CM

## 2025-07-07 PROCEDURE — 99999 PR PBB SHADOW E&M-EST. PATIENT-LVL IV: CPT | Mod: PBBFAC,,, | Performed by: FAMILY MEDICINE

## 2025-07-07 RX ORDER — VALACYCLOVIR HYDROCHLORIDE 1 G/1
500 TABLET, FILM COATED ORAL DAILY
Start: 2025-07-07

## 2025-07-07 RX ORDER — FUROSEMIDE 20 MG/1
20 TABLET ORAL DAILY PRN
Qty: 30 TABLET | Refills: 4 | Status: SHIPPED | OUTPATIENT
Start: 2025-07-07

## 2025-07-07 NOTE — PROGRESS NOTES
(Portions of this note were dictated using voice recognition software and may contain dictation related errors in spelling/grammar/syntax not found on text review)    CC:   Chief Complaint   Patient presents with    Annual Exam       HPI: 69 y.o. female          GERD: famotidine 20 mg daily .  Saw gastro on 05/16/2024.  EGD ordered for 06/26/2024.  Was on pantoprazole but felt like she did not really need it.  Currently denies any symptoms     Coronary artery calcifications:  Had gone to ED on 05/04/2024 for chest pain symptoms, believe likely GERD related.  Cardiac enzymes not elevated..  Had further cardiac testing done on 05/28/2024 with PET stress.  There was no sign of perfusion abnormality.  Has moderate diffuse coronary calcifications LAD mild calcifications RCA and moderate diffuse aortic calcifications in descending aorta.  EF was 70% at rest and 76% at stress.    Had recommended  Crestor 40 mg but she had reported prior intolerance secondary to myalgias in her legs, currently on Lipitor 80 mg daily.  Has had elevated calcium score on labs.      Also during that calcium score CT, there was incidentally noted some tiny micro nodules less than 4 mm.  There was some consideration for inflammatory/infectious etiology.  Had questions or at the time of that study and she stated that she was having some cough and back pain at that time.  Empirically treated for CPAP with doxycycline.  Currently denies any respiratory symptoms.  Most recent CT chest 07/05/2024 showed few subcentimeter pulmonary nodules throughout bilateral lung fields between 2 and 3 mm, no further follow up needed for low risk patient, consider 12 month repeat CT in high-risk patient.    PreDM:  last a1c 6.1    Genital HSV: daily suppressive valtrex    AR: on flonase    Intermittent constipation takes miralax    Peripheral edema right leg/foot past few years, have had several investigations over past no prominent findings. Was tried on diuretics  but not really helpful. Better in am and more prominent through day. No pain. Finds that she is sensitive to sodium and fluid retention.  Does feel like she has been trying to restrict sodium but legs will swell periodically.  Not painful.  Has worn compression stockings in the past but not during the summer.    KEMAL mild: PSG 2018, doesn't use CPAP     Hx of insulinoma 2008, removed and no specific issues since.     Hypertension, on valsartan 160 mg daily  Tried amlodipine but cause significant leg stiffness.  BP is stable           No tob or ETOH  Works for [employer] as   Takes care of granddtr    Diet: +indiscretions, +sweets, fast food 1-2 x monthly  Exercise:  Not much exercise recently.    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      Past Medical History:   Diagnosis Date    Abnormal Pap smear of cervix     Colon polyp     Condition not found     h/o abnl vaginal cuff biopsy-mild dysplasia    Dysplasia of vagina     vaginal cuff biopsy    Genital herpes, unspecified     GERD (gastroesophageal reflux disease)     HTN (hypertension)     HTN (hypertension) 02/26/2024    Hyperlipidemia     Insulinoma     s/p resection    Special screening for malignant neoplasms, colon 07/06/2015       Past Surgical History:   Procedure Laterality Date    COLONOSCOPY N/A 6/8/2017    Procedure: COLONOSCOPY;  Surgeon: Chadd Davis MD;  Location: [address]);  Service: Endoscopy;  Laterality: N/A;    COLONOSCOPY W/ POLYPECTOMY      ESOPHAGOGASTRODUODENOSCOPY N/A 6/26/2024    Procedure: EGD (ESOPHAGOGASTRODUODENOSCOPY);  Surgeon: Leonardo Maradiaga MD;  Location: [facility] ENDOSCOPY;  Service: Endoscopy;  Laterality: N/A;  urgent egd / dr maradiaga pt seen in clinic on 5/16/prep instructions sent to pt via portal   pt request procedure be scheduled after her PET scan  6/18/24- pc complete. DBM    HYSTERECTOMY      fibroids    insulinoma resection      SHOULDER ARTHROSCOPY Left 9/23/2015    Dr Rm        Family History   Problem Relation  "Name Age of Onset    Heart disease Mother      Hypertension Mother      Diabetes Mother      Hyperlipidemia Mother      Stroke Father age 80     Diabetes Father age 80     Heart disease Father age 80     Cancer Sister          breast cancer    Diabetes Sister      Breast cancer Sister      Cancer Maternal Aunt          ovarian cancer    Diabetes Brother      Diabetes Sister      Heart disease Brother         Social History     Tobacco Use    Smoking status: Never    Smokeless tobacco: Never   Substance Use Topics    Alcohol use: No    Drug use: No       Lab Results   Component Value Date    WBC 7.60 06/23/2025    HGB 13.5 06/23/2025    HCT 42.3 06/23/2025    MCV 93 06/23/2025     06/23/2025    CHOL 162 01/24/2025    TRIG 67 01/24/2025    HDL 63 01/24/2025    ALT 30 06/23/2025    AST 21 06/23/2025    BILITOT 0.7 06/23/2025    ALKPHOS 125 06/23/2025     06/23/2025    K 3.9 06/23/2025     06/23/2025    CREATININE 0.8 06/23/2025    ESTGFRAFRICA >60.0 07/15/2022    EGFRNONAA >60.0 07/15/2022    CALCIUM 10.2 06/23/2025    ALBUMIN 4.1 06/23/2025    BUN 14 06/23/2025    CO2 25 06/23/2025    TSH 1.361 01/24/2025    INR 1.0 01/15/2015    HGBA1C 6.1 (H) 01/24/2025    MICALBCREAT 10.0 10/29/2008    LDLCALC 85.6 01/24/2025    GLU 96 06/23/2025    [id number] 35 06/06/2017       Lab Results   Component Value Date    HGBA1C 6.1 (H) 01/24/2025    HGBA1C 5.8 (H) 06/19/2024    HGBA1C 5.7 (H) 07/14/2023    HGBA1C 5.9 (H) 07/15/2022    HGBA1C 5.9 06/06/2017             Vital signs reviewed  Vitals:    07/07/25 1543   BP: 130/66   BP Location: Left arm   Patient Position: Sitting   Pulse: 86   Temp: 97.9 °F (36.6 °C)   TempSrc: Oral   SpO2: 98%   Weight: 81.1 kg (178 lb 12.7 oz)   Height: 5' 1" (1.549 m)             Wt Readings from Last 4 Encounters:   07/07/25 81.1 kg (178 lb 12.7 oz)   06/23/25 80.8 kg (178 lb 2.1 oz)   05/07/25 80.1 kg (176 lb 9.4 oz)   04/04/25 80.5 kg (177 lb 7.5 oz)       PE:   APPEARANCE: " Well nourished, well developed, in no acute distress.    HEAD: Normocephalic, atraumatic.  NECK: Supple with no cervical lymphadenopathy.  No carotid bruits, no thyromegaly  CHEST: Good inspiratory effort. Lungs clear to auscultation with no wheezes or crackles.  CARDIOVASCULAR: Normal S1, S2. No rubs, murmurs, or gallops.  ABDOMEN: Bowel sounds normal. Not distended. Soft. No tenderness or masses. No organomegaly.  EXTREMITIES:  1+ bilateral peripheral edema lower extremities.      IMPRESSION  1. IFG (impaired fasting glucose)    2. Routine general medical examination at a health care facility    3. Hypertension, unspecified type    4. Atherosclerosis of aorta    5. KEMAL (obstructive sleep apnea)    6. Other screening mammogram    7. Asymptomatic menopausal state    8. Genital herpes simplex, unspecified site                  PLAN  Update labs below lipids and A1c to complement recent labs drawn CBC and CMP which were normal.    Blood pressure:   Continue valsartan 160 mg daily  Monitor/reduce sodium  Trial of furosemide on a PRN basis for peripheral edema.  She thinks she has had some vascular testing including arterial and venous ultrasounds through outside facility.  Nothing available in chart but she can check through her prior records.  Could consider venous insufficiency ultrasound if not able to pull up old records and symptoms are persistent     Advise on regular exercise, compression stockings     GERD stable .     Dyslipidemia with coronary artery and aortic  calcifications noted on recent PET-CT.  Lipitor 80 mg daily  She states that she was told to stop low-dose aspirin 81 mg a day.  Denies any intolerance issues.  Had recent EGD which was normal 06/26/2024 given elevated CAC/atherosclerotic disease, she can just double check with her cardiologist on risk benefit as it may be best from a preventive standpoint to beyond the low-dose aspirin plus statin for optimized cardiac risk reduction    Orders Placed  This Encounter   Procedures    Mammo Digital Screening Bilat w/ Ladarius (XPD)    DXA Bone Density Axial Skeleton 1 or more sites    Hemoglobin A1C    Lipid Panel       Medications Ordered This Encounter   Medications    furosemide (LASIX) 20 MG tablet     Sig: Take 1 tablet (20 mg total) by mouth daily as needed (leg swelling).     Dispense:  30 tablet     Refill:  4          SCREENINGS      Immunizations:   covid booster eligible  tdap 2016  PCV20 7/2022  Zoster up-to-date    Age/demographic appropriate health maintenance:  [facility]    08/26/2024.  Had diagnostic left mammogram with ultrasound given breast pain.  These tests were normal, update mammogram in 2025  DEXA 2022 normal update  colonoscopy 2017, internal hemorrhoids otherwise normal. Rpt April 2022 (2 polyps removed.--[facility])

## 2025-07-08 ENCOUNTER — LAB VISIT (OUTPATIENT)
Dept: LAB | Facility: HOSPITAL | Age: 70
End: 2025-07-08
Attending: FAMILY MEDICINE
Payer: MEDICARE

## 2025-07-08 DIAGNOSIS — R73.01 IFG (IMPAIRED FASTING GLUCOSE): ICD-10-CM

## 2025-07-08 DIAGNOSIS — I70.0 ATHEROSCLEROSIS OF AORTA: ICD-10-CM

## 2025-07-08 DIAGNOSIS — I10 HYPERTENSION, UNSPECIFIED TYPE: ICD-10-CM

## 2025-07-08 LAB
CHOLEST SERPL-MCNC: 151 MG/DL (ref 120–199)
CHOLEST/HDLC SERPL: 2.7 {RATIO} (ref 2–5)
EAG (OHS): 131 MG/DL (ref 68–131)
HBA1C MFR BLD: 6.2 % (ref 4–5.6)
HDLC SERPL-MCNC: 56 MG/DL (ref 40–75)
HDLC SERPL: 37.1 % (ref 20–50)
LDLC SERPL CALC-MCNC: 85 MG/DL (ref 63–159)
NONHDLC SERPL-MCNC: 95 MG/DL
TRIGL SERPL-MCNC: 50 MG/DL (ref 30–150)

## 2025-07-08 PROCEDURE — 36415 COLL VENOUS BLD VENIPUNCTURE: CPT

## 2025-07-08 PROCEDURE — 80061 LIPID PANEL: CPT

## 2025-07-08 PROCEDURE — 83036 HEMOGLOBIN GLYCOSYLATED A1C: CPT

## 2025-07-13 DIAGNOSIS — A60.00 GENITAL HERPES SIMPLEX, UNSPECIFIED SITE: ICD-10-CM

## 2025-07-13 NOTE — TELEPHONE ENCOUNTER
No care due was identified.  [facility] Embedded Care Due Messages. Reference number: [id number].   7/13/2025 5:51:55 AM CDT

## 2025-07-14 RX ORDER — VALACYCLOVIR HYDROCHLORIDE 1 G/1
TABLET, FILM COATED ORAL
Qty: 45 TABLET | Refills: 3 | Status: SHIPPED | OUTPATIENT
Start: 2025-07-14

## 2025-07-14 NOTE — TELEPHONE ENCOUNTER
Refill Decision Note   Rachael June  is requesting a refill authorization.  Brief Assessment and Rationale for Refill:  Approve     Medication Therapy Plan:  signed 7/7/25 but was set to no print      Comments:     Note composed:7:06 AM 07/14/2025

## 2025-08-11 ENCOUNTER — TELEPHONE (OUTPATIENT)
Dept: OPHTHALMOLOGY | Facility: CLINIC | Age: 70
End: 2025-08-11
Payer: MEDICARE

## 2025-08-22 ENCOUNTER — OFFICE VISIT (OUTPATIENT)
Dept: OPTOMETRY | Facility: CLINIC | Age: 70
End: 2025-08-22
Payer: MEDICARE

## 2025-08-22 DIAGNOSIS — H04.123 DRY EYE SYNDROME, BILATERAL: Primary | ICD-10-CM

## 2025-08-22 PROCEDURE — 99999 PR PBB SHADOW E&M-EST. PATIENT-LVL II: CPT | Mod: PBBFAC,,, | Performed by: OPTOMETRIST

## 2025-08-22 RX ORDER — FLUOROMETHOLONE 1 MG/ML
1 SUSPENSION/ DROPS OPHTHALMIC 4 TIMES DAILY
Qty: 5 ML | Refills: 0 | Status: SHIPPED | OUTPATIENT
Start: 2025-08-22 | End: 2025-09-05

## 2025-08-22 RX ORDER — CYCLOSPORINE 0.5 MG/ML
1 EMULSION OPHTHALMIC 2 TIMES DAILY
Qty: 60 EACH | Refills: 11 | Status: SHIPPED | OUTPATIENT
Start: 2025-08-22 | End: 2026-08-22

## 2025-08-28 ENCOUNTER — HOSPITAL ENCOUNTER (OUTPATIENT)
Dept: RADIOLOGY | Facility: HOSPITAL | Age: 70
Discharge: HOME | End: 2025-08-28
Attending: FAMILY MEDICINE
Payer: MEDICARE

## 2025-08-28 DIAGNOSIS — Z78.0 ASYMPTOMATIC MENOPAUSAL STATE: ICD-10-CM

## 2025-08-28 DIAGNOSIS — Z12.31 OTHER SCREENING MAMMOGRAM: ICD-10-CM

## 2025-08-28 PROCEDURE — 77080 DXA BONE DENSITY AXIAL: CPT | Mod: 26,,, | Performed by: RADIOLOGY

## 2025-08-28 PROCEDURE — 77067 SCR MAMMO BI INCL CAD: CPT | Mod: TC

## 2025-08-28 PROCEDURE — 77080 DXA BONE DENSITY AXIAL: CPT | Mod: TC

## 2025-09-03 ENCOUNTER — OFFICE VISIT (OUTPATIENT)
Dept: URGENT CARE | Facility: CLINIC | Age: 70
End: 2025-09-03
Payer: MEDICARE

## 2025-09-03 VITALS
TEMPERATURE: 101 F | OXYGEN SATURATION: 98 % | HEART RATE: 81 BPM | WEIGHT: 178 LBS | HEIGHT: 61 IN | SYSTOLIC BLOOD PRESSURE: 150 MMHG | RESPIRATION RATE: 18 BRPM | DIASTOLIC BLOOD PRESSURE: 76 MMHG | BODY MASS INDEX: 33.61 KG/M2

## 2025-09-03 DIAGNOSIS — U07.1 COVID-19 VIRUS INFECTION: Primary | ICD-10-CM

## 2025-09-03 LAB
CTP QC/QA: YES
SARS-COV+SARS-COV-2 AG RESP QL IA.RAPID: POSITIVE

## 2025-09-03 PROCEDURE — 87811 SARS-COV-2 COVID19 W/OPTIC: CPT | Mod: QW,S$GLB,, | Performed by: FAMILY MEDICINE

## 2025-09-03 PROCEDURE — 99214 OFFICE O/P EST MOD 30 MIN: CPT | Mod: S$GLB,,, | Performed by: FAMILY MEDICINE

## 2025-09-03 RX ORDER — BENZONATATE 100 MG/1
100 CAPSULE ORAL 3 TIMES DAILY PRN
Qty: 30 CAPSULE | Refills: 1 | Status: SHIPPED | OUTPATIENT
Start: 2025-09-03 | End: 2025-09-13

## 2025-09-03 RX ORDER — PROMETHAZINE HYDROCHLORIDE AND DEXTROMETHORPHAN HYDROBROMIDE 6.25; 15 MG/5ML; MG/5ML
5 SYRUP ORAL NIGHTLY PRN
Qty: 118 ML | Refills: 0 | Status: SHIPPED | OUTPATIENT
Start: 2025-09-03 | End: 2025-09-13